# Patient Record
Sex: FEMALE | Race: WHITE | Employment: OTHER | URBAN - METROPOLITAN AREA
[De-identification: names, ages, dates, MRNs, and addresses within clinical notes are randomized per-mention and may not be internally consistent; named-entity substitution may affect disease eponyms.]

---

## 2017-01-24 RX ORDER — METOPROLOL TARTRATE 25 MG/1
12.5 TABLET, FILM COATED ORAL 2 TIMES DAILY
Qty: 90 TAB | Refills: 3 | Status: SHIPPED | OUTPATIENT
Start: 2017-01-24

## 2017-05-11 DIAGNOSIS — I74.10 AORTIC MURAL THROMBUS (HCC): Primary | ICD-10-CM

## 2017-05-12 ENCOUNTER — HOSPITAL ENCOUNTER (OUTPATIENT)
Dept: CT IMAGING | Age: 62
Discharge: HOME | End: 2017-05-12
Attending: PHYSICIAN ASSISTANT
Payer: MEDICARE

## 2017-05-12 DIAGNOSIS — I74.10 AORTIC MURAL THROMBUS (HCC): ICD-10-CM

## 2017-05-12 LAB — CREAT UR-MCNC: 0.7 MG/DL (ref 0.6–1.3)

## 2017-05-12 PROCEDURE — 74011636320 HC RX REV CODE- 636/320: Performed by: PHYSICIAN ASSISTANT

## 2017-05-12 PROCEDURE — 82565 ASSAY OF CREATININE: CPT

## 2017-05-12 PROCEDURE — 74174 CTA ABD&PLVS W/CONTRAST: CPT

## 2017-05-12 RX ADMIN — IOPAMIDOL 100 ML: 755 INJECTION, SOLUTION INTRAVENOUS at 12:00

## 2017-05-25 ENCOUNTER — OFFICE VISIT (OUTPATIENT)
Dept: VASCULAR SURGERY | Age: 62
End: 2017-05-25

## 2017-05-25 VITALS
HEIGHT: 68 IN | SYSTOLIC BLOOD PRESSURE: 126 MMHG | HEART RATE: 74 BPM | RESPIRATION RATE: 12 BRPM | WEIGHT: 178 LBS | BODY MASS INDEX: 26.98 KG/M2 | DIASTOLIC BLOOD PRESSURE: 78 MMHG

## 2017-05-25 DIAGNOSIS — I74.10 AORTIC MURAL THROMBUS (HCC): Primary | ICD-10-CM

## 2017-05-25 DIAGNOSIS — Z72.0 TOBACCO ABUSE: ICD-10-CM

## 2017-05-25 DIAGNOSIS — D73.5 SPLENIC INFARCTION: ICD-10-CM

## 2017-05-25 NOTE — PROGRESS NOTES
Paula    Chief Complaint   Patient presents with   [facility] Blood Clot       HPI    Paula is a 62 y.o. female who is seen in the office today in follow up after recent CTA scan for history of aortic mural thrombus. She presents today with her . Several years ago she was diagnosed with a clotting disorder after suffering extensive clot burden. She is on long term anticoagulation with Coumadin. She was followed by Dr Domonique Ramirez for her aortic thrombus in the past. She suffered embolism to the kidney and spleen as well with splenic infarct. Kidneys recovered fully. She does have some LUQ pains which is chronic. She continues to smoke but is actively trying to quit. No claudication or rest pain. Overall she is doing well and is without complaint today. Past Medical History:   Diagnosis Date    Anemia     Aortic mural thrombus ([facility] [address] 11/24/2014    on coumadin followed by dr Sissy Smith Aortic thrombus [facility]) 03/29/13    CAD (coronary artery disease)     Clot     Aortic clot    Clotting disorder (HCC)     Coronary atherosclerosis of native coronary artery     Stable, 18 mos.  Since pce, will d/c effient    Diabetes mellitus     Heart abnormalities     Hypercholesterolemia     Hyperlipidemia     Iron deficiency     MI (myocardial infarction) ([facility] [address]     Old myocardial infarction     Post RCA PCI    Other and unspecified hyperlipidemia     On multiple meds, pt had side effect of 28 differnet meds, currently on lipofen and livalo    Postsurgical percutaneous transluminal coronary angioplasty status     stable    Postsurgical percutaneous transluminal coronary angioplasty status     Raynaud's disease     Screening for depression     Splenic infarction 03/29/13    Ulcer [facility])      Patient Active Problem List   Diagnosis Code    Coronary atherosclerosis of native coronary artery I25.10    Postsurgical percutaneous transluminal coronary angioplasty status Z98.61    Old myocardial infarction I25.2    Hyperlipidemia E78.5    Aortic mural thrombus (HCC) I74.10     Past Surgical History:   Procedure Laterality Date    CARDIAC SURG PROCEDURE UNLIST      HX  SECTION       delivery    HX COLONOSCOPY      HX CORONARY STENT PLACEMENT      RCA, VALDEZ    HX GYN      HX HEENT      HX HYSTERECTOMY      abdominal    HX ORTHOPAEDIC      right shoulder surgery     Current Outpatient Prescriptions   Medication Sig Dispense Refill    metoprolol tartrate (LOPRESSOR) 25 mg tablet Take 0.5 Tabs by mouth two (2) times a day. 90 Tab 3    co-enzyme Q-10 (CO Q-10) 100 mg capsule Take 100 mg by mouth daily.  senna-docusate (PERICOLACE) 8.6-50 mg per tablet Take 1 Tab by mouth daily.  biotin 2,500 mcg tab Take  by mouth.  pitavastatin (LIVALO) 4 mg tab tablet Take 4 mg by mouth daily.  traZODone (DESYREL) 100 mg tablet Take 100 mg by mouth nightly.  aspirin delayed-release 81 mg tablet Take  by mouth daily.  ferrous sulfate (IRON) 325 mg (65 mg iron) tablet Take  by mouth Daily (before breakfast).  magnesium 250 mg Tab Take  by mouth daily.  furosemide (LASIX) 20 mg tablet Take  by mouth daily.  oxyCODONE-acetaminophen (PERCOCET)  mg per tablet Take 1 Tab by mouth as needed.  warfarin (COUMADIN) 7.5 mg tablet Take 7.5 mg by mouth once as needed (7.5 mg x 5 days then 5mg for 1 day then repeat).  metFORMIN (GLUCOPHAGE) 500 mg tablet Take  by mouth two (2) times daily (with meals).  amLODIPine (NORVASC) 2.5 mg tablet Take 2.2 mg by mouth daily.  esomeprazole (NEXIUM) 40 mg capsule Take 40 mg by mouth daily.  DULoxetine (CYMBALTA) 60 mg capsule Take 30 mg by mouth daily.  CALCIUM CARBONATE (CALCIUM 600) 1,500 (600) mg Tab Take 600 mg by mouth once.  cyanocobalamin (VITAMIN B-12) 2,000 mcg TbER Take 2,000 mcg by mouth once.  doxycycline hyclate (DORYX) 75 mg TbEC Take 30 mg by mouth daily.       Fenofibrate (LIPOFEN) 150 mg Cap Take  by mouth daily.  omega-3 fatty acids-vitamin e (FISH OIL) 1,000 mg Cap Take 1 Cap by mouth daily.  cholecalciferol, vitamin d3, (VITAMIN D) 1,000 unit tablet Take  by mouth daily. Allergies   Allergen Reactions    Latex Other (comments)     Blisters and bleeding from blisters.  Nitroglycerin Other (comments)     Patient states \"that when given medication last time she went into arrest\"    Morphine Shortness of Breath    Sulfa (Sulfonamide Antibiotics) Nausea Only    Tetanus Vaccines And Toxoid Swelling and Other (comments)     fever     Social History     Social History    Marital status: LEGALLY      Spouse name: N/A    Number of children: N/A    Years of education: N/A     Occupational History    Not on file. Social History Main Topics    Smoking status: Current Every Day Smoker     Packs/day: 1.50     Years: 40.00    Smokeless tobacco: Never Used      Comment: working on [employer]; chewing gum and using patch    Alcohol use No      Comment: very rare    Drug use: No    Sexual activity: Yes     Partners: Male     Birth control/ protection: None     Other Topics Concern    Not on file     Social History Narrative      Family History   Problem Relation Age of Onset    Hypertension Other      essential    Heart Attack Other      myocardial infarction    Other Other      Hypercholesterolemia       Physical Exam:    Visit Vitals    /78 (BP 1 Location: Right arm, BP Patient Position: Sitting)    Pulse 74    Resp 12    Ht 5' 8\" (1.727 m)    Wt 178 lb (80.7 kg)    BMI 27.06 kg/m2      General: Well-appearing female in no acute distress   HEENT: EOMI, no scleral icterus is noted. Pulmonary: No increased work or breathing is noted. Abdomen: soft, nontender, nondistended. Extremities: Warm and well perfused bilaterally. Pt has no BLE edema noted.    Neuro: Cranial nerves II through XII are grossly intact   Integument: No ulcerations are identified visibly    CTA C/A/P:  IMPRESSION:     1. Mild atheromatous disease along the aorta. Previous 2013 aortic thrombus has  resolved. 2. No acute findings. 3. Hepatic steatosis with hepatomegaly. 4. Stable chronic splenic subcapsular collection/old infarct, decreased since  2013.  5. Diverticulosis. Impression and Plan:  Ced Castillo is a 62 y.o. female with clotting disorder and history of aortic mural thrombus. She is on long term Coumadin. She does follow with Hematology. Imaging was reviewed with patient and her  in the office today. I also did review the images with Dr. Shara Cobb. CTA scan shows completely resolved aortic thrombus. No other acute findings. No aneurysm or dissection. Discussed that no further CT scans are required at this time. She may follow-up in our office as needed. She will continue her long-term anticoagulation however. Continue to follow up with hematology as scheduled. Patient expresses understanding and agrees to plan. I did also encourage smoking cessation and patient states that she is trying very hard to quit and is working with her primary care physician.       [address], [place]

## 2018-03-19 ENCOUNTER — HOSPITAL ENCOUNTER (OUTPATIENT)
Dept: CT IMAGING | Age: 63
Discharge: HOME | End: 2018-03-19
Attending: INTERNAL MEDICINE
Payer: MEDICARE

## 2018-03-19 DIAGNOSIS — R10.9 ABDOMINAL PAIN: ICD-10-CM

## 2018-03-19 LAB — CREAT UR-MCNC: 1 MG/DL (ref 0.6–1.3)

## 2018-03-19 PROCEDURE — 74011636320 HC RX REV CODE- 636/320: Performed by: INTERNAL MEDICINE

## 2018-03-19 PROCEDURE — 82565 ASSAY OF CREATININE: CPT

## 2018-03-19 PROCEDURE — 74176 CT ABD & PELVIS W/O CONTRAST: CPT

## 2018-03-19 RX ADMIN — IOPAMIDOL 100 ML: 612 INJECTION, SOLUTION INTRAVENOUS at 07:34

## 2018-05-24 ENCOUNTER — HOSPITAL ENCOUNTER (EMERGENCY)
Age: 63
Discharge: HOME | End: 2018-05-24
Attending: EMERGENCY MEDICINE
Payer: MEDICARE

## 2018-05-24 ENCOUNTER — APPOINTMENT (OUTPATIENT)
Dept: GENERAL RADIOLOGY | Age: 63
End: 2018-05-24
Attending: EMERGENCY MEDICINE
Payer: MEDICARE

## 2018-05-24 VITALS
OXYGEN SATURATION: 96 % | WEIGHT: 180 LBS | SYSTOLIC BLOOD PRESSURE: 117 MMHG | HEIGHT: 68 IN | DIASTOLIC BLOOD PRESSURE: 73 MMHG | HEART RATE: 88 BPM | BODY MASS INDEX: 27.28 KG/M2 | RESPIRATION RATE: 16 BRPM | TEMPERATURE: 97.6 F

## 2018-05-24 DIAGNOSIS — K92.1 BLOOD IN STOOL: Primary | ICD-10-CM

## 2018-05-24 DIAGNOSIS — K59.00 CONSTIPATION, UNSPECIFIED CONSTIPATION TYPE: ICD-10-CM

## 2018-05-24 LAB
ABO + RH BLD: NORMAL
ANION GAP SERPL CALC-SCNC: 9 MMOL/L (ref 3–18)
APTT PPP: 35.7 SEC (ref 23–36.4)
BASOPHILS # BLD: 0 K/UL (ref 0–0.1)
BASOPHILS NFR BLD: 0 % (ref 0–2)
BLOOD GROUP ANTIBODIES SERPL: NORMAL
BUN SERPL-MCNC: 19 MG/DL (ref 7–18)
BUN/CREAT SERPL: 17 (ref 12–20)
CALCIUM SERPL-MCNC: 9.7 MG/DL (ref 8.5–10.1)
CHLORIDE SERPL-SCNC: 102 MMOL/L (ref 100–108)
CO2 SERPL-SCNC: 28 MMOL/L (ref 21–32)
CREAT SERPL-MCNC: 1.13 MG/DL (ref 0.6–1.3)
DIFFERENTIAL METHOD BLD: ABNORMAL
EOSINOPHIL # BLD: 0.2 K/UL (ref 0–0.4)
EOSINOPHIL NFR BLD: 2 % (ref 0–5)
ERYTHROCYTE [DISTWIDTH] IN BLOOD BY AUTOMATED COUNT: 14 % (ref 11.6–14.5)
GLUCOSE SERPL-MCNC: 81 MG/DL (ref 74–99)
HCT VFR BLD AUTO: 38.6 % (ref 35–45)
HGB BLD-MCNC: 13.3 G/DL (ref 12–16)
INR PPP: 2.1 (ref 0.8–1.2)
LYMPHOCYTES # BLD: 4 K/UL (ref 0.9–3.6)
LYMPHOCYTES NFR BLD: 48 % (ref 21–52)
MCH RBC QN AUTO: 31.1 PG (ref 24–34)
MCHC RBC AUTO-ENTMCNC: 34.5 G/DL (ref 31–37)
MCV RBC AUTO: 90.4 FL (ref 74–97)
MONOCYTES # BLD: 0.8 K/UL (ref 0.05–1.2)
MONOCYTES NFR BLD: 10 % (ref 3–10)
NEUTS SEG # BLD: 3.3 K/UL (ref 1.8–8)
NEUTS SEG NFR BLD: 40 % (ref 40–73)
PLATELET # BLD AUTO: 330 K/UL (ref 135–420)
PMV BLD AUTO: 11.1 FL (ref 9.2–11.8)
POTASSIUM SERPL-SCNC: 3.7 MMOL/L (ref 3.5–5.5)
PROTHROMBIN TIME: 23.2 SEC (ref 11.5–15.2)
RBC # BLD AUTO: 4.27 M/UL (ref 4.2–5.3)
SODIUM SERPL-SCNC: 139 MMOL/L (ref 136–145)
SPECIMEN EXP DATE BLD: NORMAL
WBC # BLD AUTO: 8.3 K/UL (ref 4.6–13.2)

## 2018-05-24 PROCEDURE — 86900 BLOOD TYPING SEROLOGIC ABO: CPT | Performed by: PHYSICIAN ASSISTANT

## 2018-05-24 PROCEDURE — 85730 THROMBOPLASTIN TIME PARTIAL: CPT | Performed by: PHYSICIAN ASSISTANT

## 2018-05-24 PROCEDURE — 85610 PROTHROMBIN TIME: CPT | Performed by: PHYSICIAN ASSISTANT

## 2018-05-24 PROCEDURE — 85025 COMPLETE CBC W/AUTO DIFF WBC: CPT | Performed by: PHYSICIAN ASSISTANT

## 2018-05-24 PROCEDURE — 99282 EMERGENCY DEPT VISIT SF MDM: CPT

## 2018-05-24 PROCEDURE — 80048 BASIC METABOLIC PNL TOTAL CA: CPT | Performed by: PHYSICIAN ASSISTANT

## 2018-05-24 PROCEDURE — 74022 RADEX COMPL AQT ABD SERIES: CPT

## 2018-05-24 RX ORDER — POLYETHYLENE GLYCOL 3350 17 G/17G
17 POWDER, FOR SOLUTION ORAL DAILY
Qty: 510 G | Refills: 0 | Status: SHIPPED | OUTPATIENT
Start: 2018-05-24 | End: 2018-10-03

## 2018-05-24 NOTE — DISCHARGE INSTRUCTIONS
Increase clear fluid oral hydration and begin daily Miralax  Avoid products containing tobacco, alcohol, caffeine and anti-inflammatory medications  Return immediately for increasing pain, fever, heavy bleeding, weakness, or any other concerns           Upper Gastrointestinal Bleeding: Care Instructions  Your Care Instructions    The digestive or gastrointestinal tract goes from the mouth to the anus. It is often called the GI tract. Bleeding in the upper GI tract can happen anywhere from the esophagus to the first part of the small intestine. Sometimes it's caused by an ulcer in your stomach. Or it may be caused by blood vessels in your esophagus. Your esophagus is the tube that carries food from your throat to your stomach. Light bleeding may not cause any symptoms at first. But if you continue to bleed for a while, you may feel very weak or tired. Sudden, heavy bleeding means you need to see a doctor right away. This kind of bleeding can be very dangerous. But it can usually be cured or controlled. The doctor may do some tests to find the cause of your bleeding. Follow-up care is a key part of your treatment and safety. Be sure to make and go to all appointments, and call your doctor if you are having problems. It's also a good idea to know your test results and keep a list of the medicines you take. How can you care for yourself at home? · Be safe with medicines. Take your medicines exactly as prescribed. Call your doctor if you think you are having a problem with your medicine. You will get more details on the specific medicines your doctor prescribes. · Do not take aspirin or other anti-inflammatory medicines, such as naproxen (Aleve) or ibuprofen (Advil, Motrin), without talking to your doctor first. Ask your doctor if it is okay to use acetaminophen (Tylenol). · Do not drink alcohol. · The bleeding may make you lose iron. So it's important to eat foods that have a lot of iron.  These include red meat, shellfish, poultry, and eggs. They also include beans, raisins, whole-grain breads, and leafy green vegetables. If you want help planning meals, you can meet with a dietitian. When should you call for help? Call 911 anytime you think you may need emergency care. For example, call if:  ? · You have sudden, severe belly pain. ? · You vomit blood or what looks like coffee grounds. ? · You passed out (lost consciousness). ? · Your stools are maroon or very bloody. ?Call your doctor now or seek immediate medical care if:  ? · You are dizzy or lightheaded, or you feel like you may faint. ? · Your stools are black and look like tar.   ? · You have belly pain. ? · You vomit or have nausea. ? · You have trouble swallowing, or it hurts when you swallow. ? Watch closely for changes in your health, and be sure to contact your doctor if you do not get better as expected. Where can you learn more? Go to http://bc-angelina.info/. Enter O987 in the search box to learn more about \"Upper Gastrointestinal Bleeding: Care Instructions. \"  Current as of: March 20, 2017  Content Version: 11.4  © 2029-0539 [employer]. Care instructions adapted under license by [employer] (which disclaims liability or warranty for this information). If you have questions about a medical condition or this instruction, always ask your healthcare professional. [place] [address] any warranty or liability for your use of this information.

## 2018-05-24 NOTE — ED NOTES
Report given to [facility].  No questions or concerns after report

## 2018-05-24 NOTE — ED PROVIDER NOTES
EMERGENCY DEPARTMENT HISTORY AND PHYSICAL EXAM    6:14 PM      Date: 5/24/2018  Patient Name: Omar Allen    History of Presenting Illness     Chief Complaint   Patient presents with    Abdominal Pain    Rectal Bleeding         History Provided By: Patient    Chief Complaint: Abdominal pain  Duration: 3 Days  Timing:  Constant  Location: LLQ  Quality: \"black tar\"  Severity: 5/10  Modifying Factors: Eating exacerbates nausea and vomiting  Associated Symptoms: constipation, dysuria, hematuria, nausea, and vomiting      Additional History (Context): Omar Allen is a 65 y.o. female with diabetes, HLD, MI, iron deficiency, and anemia who presents with constant LLQ abdominal pain for the last 3 days. Pt reports that her pain is a 5/10. She has been constipated for the same duration and what little stool comes out is like a \"black tar\" and \"thicker than usual.\" The pt has taken several over the counter laxatives with no relief. She takes iron supplements daily and opioids occasionally. Her only other sx include dysuria, hematuria, nausea, and vomiting. Urgent care ruled out UTI and Kidney infection earlier today. The pt had blood work preformed as well and a heme positive stool sample was obtained. After eating the pt vomits so she has only been drinking water for the past day. Her most recent colonoscopy was preformed by Dr. Oz Hartley and only revealed 1 benign polyp. The pt doesn't drink but she smokes 2.5 ppd. No other concerns, modifying factors, or symptoms were expressed by the pt at this time. PCP: Eladia Moon MD    Current Outpatient Prescriptions   Medication Sig Dispense Refill    polyethylene glycol (MIRALAX) 17 gram/dose powder Take 17 g by mouth daily. 1 tablespoon with 8 oz of water daily 510 g 0    metoprolol tartrate (LOPRESSOR) 25 mg tablet Take 0.5 Tabs by mouth two (2) times a day. 90 Tab 3    co-enzyme Q-10 (CO Q-10) 100 mg capsule Take 100 mg by mouth daily.       senna-docusate (PERICOLACE) 8.6-50 mg per tablet Take 1 Tab by mouth daily.  biotin 2,500 mcg tab Take  by mouth.  pitavastatin (LIVALO) 4 mg tab tablet Take 4 mg by mouth daily.  traZODone (DESYREL) 100 mg tablet Take 100 mg by mouth nightly.  aspirin delayed-release 81 mg tablet Take  by mouth daily.  ferrous sulfate (IRON) 325 mg (65 mg iron) tablet Take  by mouth Daily (before breakfast).  magnesium 250 mg Tab Take  by mouth daily.  furosemide (LASIX) 20 mg tablet Take  by mouth daily.  oxyCODONE-acetaminophen (PERCOCET)  mg per tablet Take 1 Tab by mouth as needed.  warfarin (COUMADIN) 7.5 mg tablet Take 7.5 mg by mouth once as needed (7.5 mg x 5 days then 5mg for 1 day then repeat).  metFORMIN (GLUCOPHAGE) 500 mg tablet Take  by mouth two (2) times daily (with meals).  amLODIPine (NORVASC) 2.5 mg tablet Take 2.2 mg by mouth daily.  esomeprazole (NEXIUM) 40 mg capsule Take 40 mg by mouth daily.  DULoxetine (CYMBALTA) 60 mg capsule Take 30 mg by mouth daily.  CALCIUM CARBONATE (CALCIUM 600) 1,500 (600) mg Tab Take 600 mg by mouth once.  cyanocobalamin (VITAMIN B-12) 2,000 mcg TbER Take 2,000 mcg by mouth once.  doxycycline hyclate (DORYX) 75 mg TbEC Take 30 mg by mouth daily.  Fenofibrate (LIPOFEN) 150 mg Cap Take  by mouth daily.  omega-3 fatty acids-vitamin e (FISH OIL) 1,000 mg Cap Take 1 Cap by mouth daily.  cholecalciferol, vitamin d3, (VITAMIN D) 1,000 unit tablet Take  by mouth daily. Past History     Past Medical History:  Past Medical History:   Diagnosis Date    Anemia     Aortic mural thrombus ([address] 11/24/2014    on coumadin followed by dr Gurpreet Reynoso Aortic thrombus [facility]) 03/29/13    CAD (coronary artery disease)     Clot     Aortic clot    Clotting disorder (HCC)     Coronary atherosclerosis of native coronary artery     Stable, 18 mos.  Since pce, will d/c effient    Diabetes mellitus     Heart abnormalities     Hypercholesterolemia     Hyperlipidemia     Iron deficiency     [facility] [address]     Old myocardial infarction     Post RCA PCI    Other and unspecified hyperlipidemia     On multiple meds, pt had side effect of 28 differnet meds, currently on lipofen and livalo    Postsurgical percutaneous transluminal coronary angioplasty status     stable    Postsurgical percutaneous transluminal coronary angioplasty status     Raynaud's disease     Screening for depression     Splenic infarction 13    Ulcer ([facility] [address]        Past Surgical History:  Past Surgical History:   Procedure Laterality Date    CARDIAC SURG PROCEDURE UNLIST      HX  SECTION       delivery    HX COLONOSCOPY      HX CORONARY STENT PLACEMENT      RCA, VALDEZ    HX GYN      HX HEENT      HX HYSTERECTOMY      abdominal    HX ORTHOPAEDIC      right shoulder surgery       Family History:  Family History   Problem Relation Age of Onset    Hypertension Other      essential    Heart Attack Other      myocardial infarction    Other Other      Hypercholesterolemia       Social History:  Social History   Substance Use Topics    Smoking status: Current Every Day Smoker     Packs/day: 1.50     Years: 40.00    Smokeless tobacco: Never Used      Comment: working on quiting; chewing gum and using patch    Alcohol use No      Comment: very rare       Allergies: Allergies   Allergen Reactions    Latex Other (comments)     Blisters and bleeding from blisters.  Nitroglycerin Other (comments)     Patient states \"that when given medication last time she went into arrest\"    Morphine Shortness of Breath    Sulfa (Sulfonamide Antibiotics) Nausea Only    Tetanus Vaccines And Toxoid Swelling and Other (comments)     fever         Review of Systems     Review of Systems   Constitutional: Negative for activity change, appetite change, chills, diaphoresis, fatigue, fever and unexpected weight change. HENT: Negative for congestion, dental problem, drooling, ear discharge, ear pain, facial swelling, hearing loss, mouth sores, nosebleeds, postnasal drip, rhinorrhea, sinus pressure, sneezing, sore throat, tinnitus and trouble swallowing. Eyes: Negative for photophobia, pain, discharge, redness, itching and visual disturbance. Respiratory: Negative for apnea, cough, choking, chest tightness, shortness of breath, wheezing and stridor. Cardiovascular: Negative for chest pain, palpitations and leg swelling. Gastrointestinal: Positive for abdominal pain, constipation, nausea and vomiting. Negative for abdominal distention, anal bleeding, blood in stool, diarrhea and rectal pain. Endocrine: Negative for cold intolerance, heat intolerance, polydipsia, polyphagia and polyuria. Genitourinary: Positive for dysuria and hematuria. Negative for decreased urine volume, difficulty urinating, enuresis, flank pain, frequency, genital sores and urgency. Musculoskeletal: Negative for arthralgias, back pain, gait problem, joint swelling, myalgias, neck pain and neck stiffness. Skin: Negative for color change, pallor, rash and wound. Allergic/Immunologic: Negative for environmental allergies, food allergies and immunocompromised state. Neurological: Negative for dizziness, tremors, seizures, syncope, facial asymmetry, speech difficulty, weakness, light-headedness, numbness and headaches. Hematological: Negative for adenopathy. Does not bruise/bleed easily. Psychiatric/Behavioral: Negative for agitation, behavioral problems, confusion, decreased concentration, dysphoric mood, hallucinations, self-injury, sleep disturbance and suicidal ideas. The patient is not nervous/anxious and is not hyperactive.           Physical Exam     Visit Vitals    /73 (BP 1 Location: Left arm, BP Patient Position: Sitting)    Pulse 88    Temp 97.6 °F (36.4 °C)    Resp 16    Ht 5' 8\" (1.727 m)    Wt 81.6 kg (180 lb)    SpO2 96%    BMI 27.37 kg/m2     Physical Exam   Constitutional: She is oriented to person, place, and time. She appears well-developed and well-nourished. Alert and appropriate in no apparent marked discomfort or acute respiratory distress   HENT:   Head: Normocephalic and atraumatic. Right Ear: External ear normal.   Left Ear: External ear normal.   Mouth/Throat: Oropharynx is clear and moist. No oropharyngeal exudate. Eyes: Conjunctivae and EOM are normal. Pupils are equal, round, and reactive to light. Right eye exhibits no discharge. Left eye exhibits no discharge. No scleral icterus. Neck: Normal range of motion. No tracheal deviation present. No thyromegaly present. Cardiovascular: Normal rate, regular rhythm and normal heart sounds. No murmur heard. Pulmonary/Chest: Effort normal and breath sounds normal. No respiratory distress. She has no wheezes. She has no rales. She exhibits no tenderness. Abdominal: Soft. Bowel sounds are normal. She exhibits no distension. There is no tenderness. There is no rebound and no guarding. Abdomen is flat and soft with mild LLQ tenderness without guarding; +NABS without masses or hernias; no evidence of focal peritoneal irritation or HSM   Musculoskeletal: Normal range of motion. She exhibits no edema or tenderness. Lymphadenopathy:     She has no cervical adenopathy. Neurological: She is alert and oriented to person, place, and time. No cranial nerve deficit. Coordination normal.   Skin: Skin is warm. No erythema. Psychiatric: She has a normal mood and affect. Her behavior is normal. Judgment and thought content normal.   Nursing note and vitals reviewed. Diagnostic Study Results     Labs -  No results found for this or any previous visit (from the past 12 hour(s)).   Recent Results (from the past 12 hour(s))   CBC WITH AUTOMATED DIFF    Collection Time: 05/24/18  5:40 PM   Result Value Ref Range    WBC 8.3 4.6 - 13.2 K/uL    RBC 4.27 4.20 - 5.30 M/uL    HGB 13.3 12.0 - 16.0 g/dL    HCT 38.6 35.0 - 45.0 %    MCV 90.4 74.0 - 97.0 FL    MCH 31.1 24.0 - 34.0 PG    MCHC 34.5 31.0 - 37.0 g/dL    RDW 14.0 11.6 - 14.5 %    PLATELET 028 225 - 011 K/uL    MPV 11.1 9.2 - 11.8 FL    NEUTROPHILS 40 40 - 73 %    LYMPHOCYTES 48 21 - 52 %    MONOCYTES 10 3 - 10 %    EOSINOPHILS 2 0 - 5 %    BASOPHILS 0 0 - 2 %    ABS. NEUTROPHILS 3.3 1.8 - 8.0 K/UL    ABS. LYMPHOCYTES 4.0 (H) 0.9 - 3.6 K/UL    ABS. MONOCYTES 0.8 0.05 - 1.2 K/UL    ABS. EOSINOPHILS 0.2 0.0 - 0.4 K/UL    ABS. BASOPHILS 0.0 0.0 - 0.1 K/UL    DF AUTOMATED     TYPE & SCREEN    Collection Time: 05/24/18  5:40 PM   Result Value Ref Range    Crossmatch Expiration 05/27/2018     ABO/Rh(D) PENDING     Antibody screen PENDING      PT- 2.1  BMP unremarkable        Radiologic Studies -   XR ABD ACUTE W 1 V CHEST   Final Result      History: Left lower quadrant pain     Comparison: November 15, 2014     Findings: The lungs are grossly clear. Heart is normal in size. Prior right  shoulder surgery.     No obstruction, ileus, or free air is seen. There is moderate stool burden. No  definite acute osseous abnormality.     IMPRESSION  Impression:  1. No obstruction, ileus, or free air. Moderate stool burden. Medical Decision Making   I am the first provider for this patient. I reviewed the vital signs, available nursing notes, past medical history, past surgical history, family history and social history. Vital Signs-Reviewed the patient's vital signs. Pulse Oximetry Analysis - Nonhypoxic    Cardiac Monitor:  Rate: 88  Rhythm:  NSR    Records Reviewed: Nursing Notes and Old Medical Records (Time of Review: 6:14 PM)    ED Course: Progress Notes, Reevaluation, and Consults:  Abdominal examinations were stable without signs of active bleeding, tachycardia or hypotension. I discussed the clinical findings with the patient, her family and GI all of whom agreed with outpatient follow-up.  Precautions were given prior to discharge. Will try bowel clean-out and antiacids at home but will no stop Coumadin at this time. Provider Notes (Medical Decision Making): Patient with documentation of guaiac positive stools from Urgent Care but no signs of significant active hemorrhage. Constipation with reassuring examination but will check plain films for free air or obstruction. Will check CBC to see if stable from earlier today because on Coumadin. Will have follow-up with GI if labs and hemodynamics are stable. Diagnosis     Clinical Impression:   1. Blood in stool    2. Constipation, unspecified constipation type        Disposition: Discharge    Follow-up Information     Follow up With Details Comments Contact Info    [facility] EMER[facility]ENC[facility] DEPT  As needed, If symptoms worsen [address] Maksim Alcantara MD In 1 week  [address] [place] [address]  [address]  [place] [address]  [phone]             Discharge Medication List as of 5/24/2018  7:31 PM      START taking these medications    Details   polyethylene glycol (MIRALAX) 17 gram/dose powder Take 17 g by mouth daily. 1 tablespoon with 8 oz of water daily, Normal, Disp-510 g, R-0         CONTINUE these medications which have NOT CHANGED    Details   metoprolol tartrate (LOPRESSOR) 25 mg tablet Take 0.5 Tabs by mouth two (2) times a day., Normal, Disp-90 Tab, R-3      co-enzyme Q-10 (CO Q-10) 100 mg capsule Take 100 mg by mouth daily. , Historical Med      senna-docusate (PERICOLACE) 8.6-50 mg per tablet Take 1 Tab by mouth daily. , Historical Med      biotin 2,500 mcg tab Take  by mouth., Historical Med      pitavastatin (LIVALO) 4 mg tab tablet Take 4 mg by mouth daily. , Historical Med      traZODone (DESYREL) 100 mg tablet Take 100 mg by mouth nightly., Historical Med      aspirin delayed-release 81 mg tablet Take  by mouth daily. , Historical Med      ferrous sulfate (IRON) 325 mg (65 mg iron) tablet Take  by mouth Daily (before breakfast). Historical Med      magnesium 250 mg Tab Take  by mouth daily. , Historical Med      furosemide (LASIX) 20 mg tablet Take  by mouth daily. Historical Med      oxyCODONE-acetaminophen (PERCOCET)  mg per tablet Take 1 Tab by mouth as needed. Historical Med, 1 Tab      warfarin (COUMADIN) 7.5 mg tablet Take 7.5 mg by mouth once as needed (7.5 mg x 5 days then 5mg for 1 day then repeat). , Historical Med      metFORMIN (GLUCOPHAGE) 500 mg tablet Take  by mouth two (2) times daily (with meals). Historical Med      amLODIPine (NORVASC) 2.5 mg tablet Take 2.2 mg by mouth daily. Historical Med, 2.2 mg      esomeprazole (NEXIUM) 40 mg capsule Take 40 mg by mouth daily. Historical Med, 40 mg      DULoxetine (CYMBALTA) 60 mg capsule Take 30 mg by mouth daily. , Historical Med      CALCIUM CARBONATE (CALCIUM 600) 1,500 (600) mg Tab Take 600 mg by mouth once. Historical Med, 600 mg      cyanocobalamin (VITAMIN B-12) 2,000 mcg TbER Take 2,000 mcg by mouth once. Historical Med, 2,000 mcg      doxycycline hyclate (DORYX) 75 mg TbEC Take 30 mg by mouth daily. , Historical Med      Fenofibrate (LIPOFEN) 150 mg Cap Take  by mouth daily. , Historical Med      omega-3 fatty acids-vitamin e (FISH OIL) 1,000 mg Cap Take 1 Cap by mouth daily. , Historical Med      cholecalciferol, vitamin d3, (VITAMIN D) 1,000 unit tablet Take  by mouth daily. Historical Med           _______________________________    Attestations:  Scribe Attestation     Ellen Harris acting as a scribe for and in the presence of Danilo Gasca MD      May 24, 2018 at 6:14 PM       Provider Attestation:      I personally performed the services described in the documentation, reviewed the documentation, as recorded by the scribe in my presence, and it accurately and completely records my words and actions.  May 24, 2018 at 6:14 PM - Danilo Gasca MD    _______________________________

## 2018-05-24 NOTE — ED NOTES
Reviewed discharge instructions. Questions encouraged and answered. Patient verbalized an understanding.   Departed ed in no distress ambulatory with family

## 2018-05-30 ENCOUNTER — ANESTHESIA EVENT (OUTPATIENT)
Dept: ENDOSCOPY | Age: 63
End: 2018-05-30
Payer: MEDICARE

## 2018-05-31 ENCOUNTER — HOSPITAL ENCOUNTER (OUTPATIENT)
Age: 63
Setting detail: OUTPATIENT SURGERY
Discharge: HOME | End: 2018-05-31
Attending: INTERNAL MEDICINE | Admitting: INTERNAL MEDICINE
Payer: MEDICARE

## 2018-05-31 ENCOUNTER — ANESTHESIA (OUTPATIENT)
Dept: ENDOSCOPY | Age: 63
End: 2018-05-31
Payer: MEDICARE

## 2018-05-31 VITALS
HEART RATE: 78 BPM | OXYGEN SATURATION: 97 % | TEMPERATURE: 97.9 F | DIASTOLIC BLOOD PRESSURE: 73 MMHG | SYSTOLIC BLOOD PRESSURE: 123 MMHG | RESPIRATION RATE: 18 BRPM

## 2018-05-31 LAB
GLUCOSE BLD STRIP.AUTO-MCNC: 91 MG/DL (ref 70–110)
GLUCOSE BLD STRIP.AUTO-MCNC: 94 MG/DL (ref 70–110)
INR BLD: 2.2 (ref 0.9–1.2)

## 2018-05-31 PROCEDURE — 82962 GLUCOSE BLOOD TEST: CPT

## 2018-05-31 PROCEDURE — 77030018846 HC SOL IRR STRL H20 ICUM -A: Performed by: INTERNAL MEDICINE

## 2018-05-31 PROCEDURE — 77030008565 HC TBNG SUC IRR ERBE -B: Performed by: INTERNAL MEDICINE

## 2018-05-31 PROCEDURE — 74011250636 HC RX REV CODE- 250/636

## 2018-05-31 PROCEDURE — 74011000250 HC RX REV CODE- 250

## 2018-05-31 PROCEDURE — 77030019988 HC FCPS ENDOSC DISP BSC -B: Performed by: INTERNAL MEDICINE

## 2018-05-31 PROCEDURE — 74011250636 HC RX REV CODE- 250/636: Performed by: NURSE ANESTHETIST, CERTIFIED REGISTERED

## 2018-05-31 PROCEDURE — 76060000031 HC ANESTHESIA FIRST 0.5 HR: Performed by: INTERNAL MEDICINE

## 2018-05-31 PROCEDURE — 85610 PROTHROMBIN TIME: CPT

## 2018-05-31 PROCEDURE — 76040000019: Performed by: INTERNAL MEDICINE

## 2018-05-31 PROCEDURE — 74011000250 HC RX REV CODE- 250: Performed by: NURSE ANESTHETIST, CERTIFIED REGISTERED

## 2018-05-31 RX ORDER — INSULIN LISPRO 100 [IU]/ML
INJECTION, SOLUTION INTRAVENOUS; SUBCUTANEOUS ONCE
Status: CANCELLED | OUTPATIENT
Start: 2018-05-31 | End: 2018-05-31

## 2018-05-31 RX ORDER — INSULIN LISPRO 100 [IU]/ML
INJECTION, SOLUTION INTRAVENOUS; SUBCUTANEOUS ONCE
Status: DISCONTINUED | OUTPATIENT
Start: 2018-05-31 | End: 2018-05-31 | Stop reason: HOSPADM

## 2018-05-31 RX ORDER — LIDOCAINE HYDROCHLORIDE 20 MG/ML
INJECTION, SOLUTION EPIDURAL; INFILTRATION; INTRACAUDAL; PERINEURAL AS NEEDED
Status: DISCONTINUED | OUTPATIENT
Start: 2018-05-31 | End: 2018-05-31 | Stop reason: HOSPADM

## 2018-05-31 RX ORDER — MAGNESIUM SULFATE 100 %
4 CRYSTALS MISCELLANEOUS AS NEEDED
Status: CANCELLED | OUTPATIENT
Start: 2018-05-31

## 2018-05-31 RX ORDER — SODIUM CHLORIDE, SODIUM LACTATE, POTASSIUM CHLORIDE, CALCIUM CHLORIDE 600; 310; 30; 20 MG/100ML; MG/100ML; MG/100ML; MG/100ML
50 INJECTION, SOLUTION INTRAVENOUS CONTINUOUS
Status: DISCONTINUED | OUTPATIENT
Start: 2018-05-31 | End: 2018-05-31 | Stop reason: HOSPADM

## 2018-05-31 RX ORDER — ONDANSETRON 2 MG/ML
4 INJECTION INTRAMUSCULAR; INTRAVENOUS ONCE
Status: CANCELLED | OUTPATIENT
Start: 2018-05-31 | End: 2018-05-31

## 2018-05-31 RX ORDER — PROPOFOL 10 MG/ML
INJECTION, EMULSION INTRAVENOUS AS NEEDED
Status: DISCONTINUED | OUTPATIENT
Start: 2018-05-31 | End: 2018-05-31 | Stop reason: HOSPADM

## 2018-05-31 RX ORDER — DEXTROSE 50 % IN WATER (D50W) INTRAVENOUS SYRINGE
25-50 AS NEEDED
Status: CANCELLED | OUTPATIENT
Start: 2018-05-31

## 2018-05-31 RX ADMIN — PROPOFOL 20 MG: 10 INJECTION, EMULSION INTRAVENOUS at 14:24

## 2018-05-31 RX ADMIN — PROPOFOL 30 MG: 10 INJECTION, EMULSION INTRAVENOUS at 14:21

## 2018-05-31 RX ADMIN — FAMOTIDINE 20 MG: 10 INJECTION, SOLUTION INTRAVENOUS at 12:02

## 2018-05-31 RX ADMIN — PROPOFOL 20 MG: 10 INJECTION, EMULSION INTRAVENOUS at 14:22

## 2018-05-31 RX ADMIN — LIDOCAINE HYDROCHLORIDE 50 MG: 20 INJECTION, SOLUTION EPIDURAL; INFILTRATION; INTRACAUDAL; PERINEURAL at 14:18

## 2018-05-31 RX ADMIN — PROPOFOL 50 MG: 10 INJECTION, EMULSION INTRAVENOUS at 14:20

## 2018-05-31 RX ADMIN — SODIUM CHLORIDE, SODIUM LACTATE, POTASSIUM CHLORIDE, AND CALCIUM CHLORIDE 50 ML/HR: 600; 310; 30; 20 INJECTION, SOLUTION INTRAVENOUS at 12:02

## 2018-05-31 NOTE — ANESTHESIA PREPROCEDURE EVALUATION
Anesthetic History   No history of anesthetic complications            Review of Systems / Medical History  Patient summary reviewed and pertinent labs reviewed    Pulmonary          Smoker         Neuro/Psych   Within defined limits           Cardiovascular              CAD    Exercise tolerance: >4 METS     GI/Hepatic/Renal     GERD: poorly controlled           Endo/Other    Diabetes: well controlled, type 2         Other Findings   Comments: Documentation of current medication  Current medications obtained, documented and obtained? YES      Risk Factors for Postoperative nausea/vomiting:       History of postoperative nausea/vomiting? NO       Female? YES       Motion sickness? NO       Intended opioid administration for postoperative analgesia? NO      Smoking Abstinence:  Current Smoker? YES  Elective Surgery? YES  Seen preoperatively by anesthesiologist or proxy prior to day of surgery? YES  Pt abstained from smoking 24 hours prior to anesthesia?  NO    Preventive care/screening for High Blood Pressure:  Aged 18 years and older: YES  Screened for high blood pressure: YES  Patients with high blood pressure referred to primary care provider   for BP management: YES                 Physical Exam    Airway  Mallampati: III  TM Distance: 4 - 6 cm  Neck ROM: decreased range of motion   Mouth opening: Normal     Cardiovascular    Rhythm: regular  Rate: normal         Dental  No notable dental hx       Pulmonary  Breath sounds clear to auscultation               Abdominal  GI exam deferred       Other Findings            Anesthetic Plan    ASA: 3  Anesthesia type: MAC            Anesthetic plan and risks discussed with: Patient

## 2018-05-31 NOTE — H&P
[email or web address]  [phone]      GASTROENTEROLOGY Pre-Procedure H and P      Impression/Plan:   1. This patient is consented for an EGD for GI bleeding      Chief Complaint: GI bleeding      HPI:  Jessie Rao is a 65 y.o. female who is being is having an EGD for GI bleeding  PMH:   Past Medical History:   Diagnosis Date    Anemia     Aortic mural thrombus ([facility] [address] 2014    on coumadin followed by dr Tona Gandara Aortic thrombus [facility]) 13    CAD (coronary artery disease)     Clot     Aortic clot    Clotting disorder ([facility] [address]     Coronary atherosclerosis of native coronary artery     Stable, 18 mos. Since pce, will d/c effient    Diabetes mellitus     Heart abnormalities     Hypercholesterolemia     Hyperlipidemia     Iron deficiency     MI (myocardial infarction) ([facility] [address]     Old myocardial infarction     Post RCA PCI    Other and unspecified hyperlipidemia     On multiple meds, pt had side effect of 28 differnet meds, currently on lipofen and livalo    Postsurgical percutaneous transluminal coronary angioplasty status     stable    Postsurgical percutaneous transluminal coronary angioplasty status     Raynaud's disease     Screening for depression     Splenic infarction 13    Ulcer ([facility] [address]        PSH:   Past Surgical History:   Procedure Laterality Date    CARDIAC SURG PROCEDURE UNLIST      HX  SECTION       delivery    HX COLONOSCOPY      HX CORONARY STENT PLACEMENT      RCA, VALDEZ    HX GYN      HX HEENT      HX HYSTERECTOMY      abdominal    HX ORTHOPAEDIC      right shoulder surgery       Social HX:   Social History     Social History    Marital status:      Spouse name: N/A    Number of children: N/A    Years of education: N/A     Occupational History    Not on file.      Social History Main Topics    Smoking status: Current Every Day Smoker     Packs/day: 1.50     Years: 40.00    Smokeless tobacco: Never Used      Comment: working on [employer]; chewing gum and using patch    Alcohol use No      Comment: very rare    Drug use: No    Sexual activity: Yes     Partners: Male     Birth control/ protection: None     Other Topics Concern    Not on file     Social History Narrative       FHX:   Family History   Problem Relation Age of Onset    Hypertension Other      essential    Heart Attack Other      myocardial infarction    Other Other      Hypercholesterolemia       Allergy:   Allergies   Allergen Reactions    Latex Other (comments)     Blisters and bleeding from blisters.  Nitroglycerin Other (comments)     Patient states \"that when given medication last time she went into arrest\"    Morphine Shortness of Breath    Sulfa (Sulfonamide Antibiotics) Nausea Only    Tetanus Vaccines And Toxoid Swelling and Other (comments)     fever       Home Medications:     Prescriptions Prior to Admission   Medication Sig    metoprolol tartrate (LOPRESSOR) 25 mg tablet Take 0.5 Tabs by mouth two (2) times a day.  co-enzyme Q-10 (CO Q-10) 100 mg capsule Take 100 mg by mouth daily.  pitavastatin (LIVALO) 4 mg tab tablet Take 4 mg by mouth daily.  traZODone (DESYREL) 100 mg tablet Take 100 mg by mouth nightly.  aspirin delayed-release 81 mg tablet Take  by mouth daily.  ferrous sulfate (IRON) 325 mg (65 mg iron) tablet Take  by mouth Daily (before breakfast).  magnesium 250 mg Tab Take  by mouth daily.  furosemide (LASIX) 20 mg tablet Take  by mouth daily.  warfarin (COUMADIN) 7.5 mg tablet Take 7.5 mg by mouth once as needed (7.5 mg x 5 days then 5mg for 1 day then repeat).  metFORMIN (GLUCOPHAGE) 500 mg tablet Take  by mouth two (2) times daily (with meals).  amLODIPine (NORVASC) 2.5 mg tablet Take 2.2 mg by mouth daily.  esomeprazole (NEXIUM) 40 mg capsule Take 40 mg by mouth daily.  DULoxetine (CYMBALTA) 60 mg capsule Take 30 mg by mouth daily.  CALCIUM CARBONATE (CALCIUM 600) 1,500 (600) mg Tab Take 600 mg by mouth once.  cyanocobalamin (VITAMIN B-12) 2,000 mcg TbER Take 2,000 mcg by mouth once.  doxycycline hyclate (DORYX) 75 mg TbEC Take 30 mg by mouth daily.  Fenofibrate (LIPOFEN) 150 mg Cap Take  by mouth daily.  omega-3 fatty acids-vitamin e (FISH OIL) 1,000 mg Cap Take 1 Cap by mouth daily.  cholecalciferol, vitamin d3, (VITAMIN D) 1,000 unit tablet Take  by mouth daily.  polyethylene glycol (MIRALAX) 17 gram/dose powder Take 17 g by mouth daily. 1 tablespoon with 8 oz of water daily    senna-docusate (PERICOLACE) 8.6-50 mg per tablet Take 1 Tab by mouth daily.  biotin 2,500 mcg tab Take  by mouth.  oxyCODONE-acetaminophen (PERCOCET)  mg per tablet Take 1 Tab by mouth as needed. Review of Systems:     Constitutional: No fevers, chills, weight loss, fatigue. Skin: No rashes, pruritis, jaundice, ulcerations, erythema. HENT: No headaches, nosebleeds, sinus pressure, rhinorrhea, sore throat. Eyes: No visual changes, blurred vision, eye pain, photophobia, jaundice. Cardiovascular: No chest pain, heart palpitations. Respiratory: No cough, SOB, wheezing, chest discomfort, orthopnea. Gastrointestinal:    Genitourinary: No dysuria, bleeding, discharge, pyuria. Musculoskeletal: No weakness, arthralgias, wasting. Endo: No sweats. Heme: No bruising, easy bleeding. Allergies: As noted. Neurological: Cranial nerves intact. Alert and oriented. Gait not assessed. Psychiatric:  No anxiety, depression, hallucinations. Visit Vitals    /79    Pulse 82    Temp 98.6 °F (37 °C)    Resp 18    SpO2 96%       Physical Assessment:     constitutional: appearance: well developed, well nourished, normal habitus, no deformities, in no acute distress. skin: inspection: no rashes, ulcers, icterus or other lesions; no clubbing or telangiectasias. palpation: no induration or subcutaneos nodules.    eyes: inspection: normal conjunctivae and lids; no jaundice pupils: normal  ENMT: mouth: normal oral mucosa,lips and gums; good dentition. oropharynx: normal tongue, hard and soft palate; posterior pharynx without erithema, exudate or lesions. neck: thyroid: normal size, consistency and position; no masses or tenderness. respiratory: effort: normal chest excursion; no intercostal retraction or accessory muscle use. cardiovascular: abdominal aorta: normal size and position; no bruits. palpation: PMI of normal size and position; normal rhythm; no thrill or murmurs. abdominal: abdomen: normal consistency; no tenderness or masses. hernias: no hernias appreciated. liver: normal size and consistency. spleen: not palpable. rectal: hemoccult/guaiac: not performed. musculoskeletal: digits and nails: no clubbing, cyanosis, petechiae or other inflammatory conditions. gait: normal gait and station head and neck: normal range of motion; no pain, crepitation or contracture. spine/ribs/pelvis: normal range of motion; no pain, deformity or contracture. neurologic: cranial nerves: II-XII normal.   psychiatric: judgement/insight: within normal limits. memory: within normal limits for recent and remote events. mood and affect: no evidence of depression, anxiety or agitation. orientation: oriented to time, space and person. Basic Metabolic Profile   No results for input(s): NA, K, CL, CO2, BUN, GLU, CA, MG, PHOS in the last 72 hours. No lab exists for component: CREAT      CBC w/Diff    No results for input(s): WBC, RBC, HGB, HCT, MCV, MCH, MCHC, RDW, PLT, HGBEXT, HCTEXT, PLTEXT in the last 72 hours. No lab exists for component: MPV No results for input(s): GRANS, LYMPH, EOS, PRO, MYELO, METAS, BLAST in the last 72 hours. No lab exists for component: MONO, BASO     Hepatic Function   No results for input(s): ALB, TP, TBILI, GPT, SGOT, AP, AML, LPSE in the last 72 hours.     No lab exists for component: DBILI     Coanarinder   Recent Labs      05/31/18   1202   INR  2.2*           Olamide Henning, MD  [facility] [address] 1946, [address] [facility]  Cell: [phone]  Direct pager: [phone]  [email or web address]  [email or web address]

## 2018-05-31 NOTE — DISCHARGE INSTRUCTIONS
Upper GI Endoscopy: What to Expect at [address]  After you have an endoscopy, you will stay at the hospital or clinic for 1 to 2 hours. This will allow the medicine to wear off. You will be able to go home after your doctor or nurse checks to make sure you are not having any problems. You may have to stay overnight if you had treatment during the test. You may have a sore throat for a day or two after the test.  This care sheet gives you a general idea about what to expect after the test.  How can you care for yourself at home? Activity  · Rest as much as you need to after you go home. · You should be able to go back to your usual activities the day after the test.  Diet  · Follow your doctor's directions for eating after the test.  · Drink plenty of fluids (unless your doctor has told you not to). Medications  · If you have a sore throat the day after the test, use an over-the-counter spray to numb your throat. Follow-up care is a key part of your treatment and safety. Be sure to make and go to all appointments, and call your doctor if you are having problems. It's also a good idea to know your test results and keep a list of the medicines you take. When should you call for help? Call 911 anytime you think you may need emergency care. For example, call if:  ? · You passed out (loses consciousness). ? · You have trouble breathing. ? · You pass maroon or bloody stools. ?Call your doctor now or seek immediate medical care if:  ? · You have pain that does not get better after your take pain medicine. ? · You have new or worse belly pain. ? · You have blood in your stools. ? · You are sick to your stomach and cannot keep fluids down. ? · You have a fever. ? · You cannot pass stools or gas. ? Watch closely for changes in your health, and be sure to contact your doctor if:  ? · Your throat still hurts after a day or two. ? · You do not get better as expected.    Where can you learn more?  Go to http://bc-angelina.info/. Enter [phone] in the search box to learn more about \"Upper GI Endoscopy: What to Expect at Home. \"  Current as of: May 12, 2017  Content Version: 11.4  © 7411-5045 [employer]. Care instructions adapted under license by [employer] (which disclaims liability or warranty for this information). If you have questions about a medical condition or this instruction, always ask your healthcare professional. [place] [address] any warranty or liability for your use of this information. DISCHARGE SUMMARY from Nurse    PATIENT INSTRUCTIONS:    After general anesthesia or intravenous sedation, for 24 hours or while taking prescription Narcotics:  · Limit your activities  · Do not drive and operate hazardous machinery  · Do not make important personal or business decisions  · Do  not drink alcoholic beverages  · If you have not urinated within 8 hours after discharge, please contact your surgeon on call. Report the following to your surgeon:  · Excessive pain, swelling, redness or odor of or around the surgical area  · Temperature over 100.5  · Nausea and vomiting lasting longer than 4 hours or if unable to take medications  · Any signs of decreased circulation or nerve impairment to extremity: change in color, persistent  numbness, tingling, coldness or increase pain  · Any questions    What to do at Home:  Recommended activity: Activity as tolerated and no driving for today      These are general instructions for a healthy lifestyle:    No smoking/ No tobacco products/ Avoid exposure to second hand smoke  Surgeon General's Warning:  Quitting smoking now greatly reduces serious risk to your health.     Obesity, smoking, and sedentary lifestyle greatly increases your risk for illness    A healthy diet, regular physical exercise & weight monitoring are important for maintaining a healthy lifestyle    You may be retaining fluid if you have a history of heart failure or if you experience any of the following symptoms:  Weight gain of 3 pounds or more overnight or 5 pounds in a week, increased swelling in our hands or feet or shortness of breath while lying flat in bed. Please call your doctor as soon as you notice any of these symptoms; do not wait until your next office visit. Recognize signs and symptoms of STROKE:    F-face looks uneven    A-arms unable to move or move unevenly    S-speech slurred or non-existent    T-time-call 911 as soon as signs and symptoms begin-DO NOT go       Back to bed or wait to see if you get better-TIME IS BRAIN. Warning Signs of HEART ATTACK     Call 911 if you have these symptoms:   Chest discomfort. Most heart attacks involve discomfort in the center of the chest that lasts more than a few minutes, or that goes away and comes back. It can feel like uncomfortable pressure, squeezing, fullness, or pain.  Discomfort in other areas of the upper body. Symptoms can include pain or discomfort in one or both arms, the back, neck, jaw, or stomach.  Shortness of breath with or without chest discomfort.  Other signs may include breaking out in a cold sweat, nausea, or lightheadedness. Don't wait more than five minutes to call 911 - MINUTES MATTER! Fast action can save your life. Calling 911 is almost always the fastest way to get lifesaving treatment. Emergency Medical Services staff can begin treatment when they arrive -- up to an hour sooner than if someone gets to the hospital by car. The discharge information has been reviewed with the patient. The patient verbalized understanding. Discharge medications reviewed with the patient and appropriate educational materials and side effects teaching were provided.   ___________________________________________________________________________________________________________________________________

## 2018-05-31 NOTE — ANESTHESIA POSTPROCEDURE EVALUATION
Post-Anesthesia Evaluation and Assessment    Patient: Annabel Bañuelos MRN: [id number]  SSN: [id number]    YOB: 1955  Age: 65 y.o. Sex: female       Cardiovascular Function/Vital Signs  Visit Vitals    /87    Pulse 80    Temp 36.7 °C (98.1 °F)    Resp 18    SpO2 98%       Patient is status post MAC anesthesia for Procedure(s):  ENDOSCOPY. Nausea/Vomiting: None    Postoperative hydration reviewed and adequate. Pain:  Pain Scale 1: Numeric (0 - 10) (05/31/18 1217)  Pain Intensity 1: 7 (05/31/18 1148)   Managed    Neurological Status: At baseline    Mental Status and Level of Consciousness: Arousable    Pulmonary Status:   O2 Device: Room air (05/31/18 1430)   Adequate oxygenation and airway patent    Complications related to anesthesia: None    Post-anesthesia assessment completed.  No concerns    Signed By: Lenny Sanchez MD     May 31, 2018

## 2018-05-31 NOTE — PROGRESS NOTES
[email or web address] [address]  [place], [address]      Brief Procedure Note    Lilliam Nielsen  [birth year]  [id number]    Date of Procedure: 5/31/2018    Preoperative diagnosis: DX    Postoperative diagnosis: normal exam.    Type of Anesthesia: MAC (Monitored anesthesia care)    Description of findings: same as post op dx    Procedure: Procedure(s):  ENDOSCOPY    :  Dr. Lulú Gonzalez MD    Assistant(s): Endoscopy Technician-1: Ken Morejon  Endoscopy RN-1: Gail Calero RN; Brook Dumont RN    EBL:None    Specimens: * No specimens in log *    Findings: See printed and scanned procedure note    Complications: None    Dr. Lulú Gonzalez MD  5/31/2018  2:33 PM

## 2018-05-31 NOTE — IP AVS SNAPSHOT
[address] 61 [facility] Patient: Shanna Jimenez MRN: [id number] IID:[id number] About your hospitalization You were admitted on:  May 31, 2018 You last received care in the:  [facility] PHASE 2 RECOVERY You were discharged on:  May 31, 2018 Why you were hospitalized Your primary diagnosis was:  Not on File Follow-up Information Follow up With Details Comments Contact Info MD Jag Norton 27 3860 [facility] [address] 
[phone] Yanira Casillas MD  Follow up as instructed [address] 200 [facility] 
[phone] Your Scheduled Appointments Thursday May 31, 2018 ENDOSCOPY with Yanira Casillas MD  
[facility] ENDOSCOPY [address]) 920 [facility] [address] Discharge Orders None A check severiano indicates which time of day the medication should be taken. My Medications CONTINUE taking these medications Instructions Each Dose to Equal  
 Morning Noon Evening Bedtime  
 amLODIPine 2.5 mg tablet Commonly known as:  Sundra Boon Your last dose was: Your next dose is: Take 2.2 mg by mouth daily. 2.2 mg  
    
   
   
   
  
 aspirin delayed-release 81 mg tablet Your last dose was: Your next dose is: Take  by mouth daily. biotin 2,500 mcg Tab Your last dose was: Your next dose is: Take  by mouth. CALCIUM 600 600 mg calcium (1,500 mg) tablet Your last dose was: Your next dose is: Take 600 mg by mouth once. 600 mg  
    
   
   
   
  
 CO Q-10 100 mg capsule Generic drug:  co-enzyme Q-10 Your last dose was: Your next dose is: Take 100 mg by mouth daily.   
 100 mg  
    
   
   
   
  
 COUMADIN 7.5 mg tablet Generic drug:  warfarin Your last dose was: Your next dose is: Take 7.5 mg by mouth once as needed (7.5 mg x 5 days then 5mg for 1 day then repeat). 7.5 mg  
    
   
   
   
  
 CYMBALTA 60 mg capsule Generic drug:  DULoxetine Your last dose was: Your next dose is: Take 30 mg by mouth daily. 30 mg  
    
   
   
   
  
 DORYX 75 mg Tbec Generic drug:  doxycycline hyclate Your last dose was: Your next dose is: Take 30 mg by mouth daily. 30 mg FISH OIL 1,000 mg Cap Generic drug:  omega-3 fatty acids-vitamin e Your last dose was: Your next dose is: Take 1 Cap by mouth daily. 1 Cap  
    
   
   
   
  
 furosemide 20 mg tablet Commonly known as:  LASIX Your last dose was: Your next dose is: Take  by mouth daily. Iron 325 mg (65 mg iron) tablet Generic drug:  ferrous sulfate Your last dose was: Your next dose is: Take  by mouth Daily (before breakfast). LIPOFEN 150 mg Cap Generic drug:  Fenofibrate Your last dose was: Your next dose is: Take  by mouth daily. LIVALO 4 mg Tab tablet Generic drug:  pitavastatin calcium Your last dose was: Your next dose is: Take 4 mg by mouth daily. 4 mg  
    
   
   
   
  
 magnesium 250 mg Tab Your last dose was: Your next dose is: Take  by mouth daily. metFORMIN 500 mg tablet Commonly known as:  GLUCOPHAGE Your last dose was: Your next dose is: Take  by mouth two (2) times daily (with meals). metoprolol tartrate 25 mg tablet Commonly known as:  LOPRESSOR Your last dose was: Your next dose is: Take 0.5 Tabs by mouth two (2) times a day. 12.5 mg NexIUM 40 mg capsule Generic drug:  esomeprazole Your last dose was: Your next dose is: Take 40 mg by mouth daily. 40 mg PERCOCET  mg per tablet Generic drug:  oxyCODONE-acetaminophen Your last dose was: Your next dose is: Take 1 Tab by mouth as needed. 1 Tab  
    
   
   
   
  
 polyethylene glycol 17 gram/dose powder Commonly known as:  Marek Holster Your last dose was: Your next dose is: Take 17 g by mouth daily. 1 tablespoon with 8 oz of water daily 17 g  
    
   
   
   
  
 senna-docusate 8.6-50 mg per tablet Commonly known as:  Lonia Triny Your last dose was: Your next dose is: Take 1 Tab by mouth daily. 1 Tab  
    
   
   
   
  
 traZODone 100 mg tablet Commonly known as:  Harrah Macadamia Your last dose was: Your next dose is: Take 100 mg by mouth nightly. 100 mg  
    
   
   
   
  
 VITAMIN B-12 2,000 mcg Tber Generic drug:  cyanocobalamin Your last dose was: Your next dose is: Take 2,000 mcg by mouth once. 2000 mcg VITAMIN D3 1,000 unit tablet Generic drug:  cholecalciferol Your last dose was: Your next dose is: Take  by mouth daily. Opioid Education Prescription Opioids: What You Need to Know: 
 
Prescription opioids can be used to help relieve moderate-to-severe pain and are often prescribed following a surgery or injury, or for certain health conditions. These medications can be an important part of treatment but also come with serious risks. Opioids are strong pain medicines. Examples include hydrocodone, oxycodone, fentanyl, and morphine. Heroin is an example of an illegal opioid.   It is important to work with your health care provider to make sure you are getting the safest, most effective care. WHAT ARE THE RISKS AND SIDE EFFECTS OF OPIOID USE? Prescription opioids carry serious risks of addiction and overdose, especially with prolonged use. An opioid overdose, often marked by slow breathing, can cause sudden death. The use of prescription opioids can have a number of side effects as well, even when taken as directed. · Tolerance-meaning you might need to take more of a medication for the same pain relief · Physical dependence-meaning you have symptoms of withdrawal when the medication is stopped. Withdrawal symptoms can include nausea, sweating, chills, diarrhea, stomach cramps, and muscle aches. Withdrawal can last up to several weeks, depending on which drug you took and how long you took it. · Increased sensitivity to pain · Constipation · Nausea, vomiting, and dry mouth · Sleepiness and dizziness · Confusion · Depression · Low levels of testosterone that can result in lower sex drive, energy, and strength · Itching and sweating RISKS ARE GREATER WITH:      
· History of drug misuse, substance use disorder, or overdose · Mental health conditions (such as depression or anxiety) · Sleep apnea · Older age (72 years or older) · Pregnancy Avoid alcohol while taking prescription opioids. Also, unless specifically advised by your health care provider, medications to avoid include: · Benzodiazepines (such as Xanax or Valium) · Muscle relaxants (such as Soma or Flexeril) · Hypnotics (such as Ambien or Lunesta) · Other prescription opioids KNOW YOUR OPTIONS Talk to your health care provider about ways to manage your pain that don't involve prescription opioids. Some of these options may actually work better and have fewer risks and side effects. Options may include: 
· Pain relievers such as acetaminophen, ibuprofen, and naproxen · Some medications that are also used for depression or seizures · Physical therapy and exercise · Counseling to help patients learn how to cope better with triggers of pain and stress. · Application of heat or cold compress · Massage therapy · Relaxation techniques Be Informed Make sure you know the name of your medication, how much and how often to take it, and its potential risks & side effects. IF YOU ARE PRESCRIBED OPIOIDS FOR PAIN: 
· Never take opioids in greater amounts or more often than prescribed. Remember the goal is not to be pain-free but to manage your pain at a tolerable level. · Follow up with your primary care provider to: · Work together to create a plan on how to manage your pain. · Talk about ways to help manage your pain that don't involve prescription opioids. · Talk about any and all concerns and side effects. · Help prevent misuse and abuse. · Never sell or share prescription opioids · Help prevent misuse and abuse. · Store prescription opioids in a secure place and out of reach of others (this may include visitors, children, friends, and family). · Safely dispose of unused/unwanted prescription opioids: Find your community drug take-back program or your pharmacy mail-back program, or flush them down the toilet, following guidance from the Food and Drug Administration (www.fda.gov/Drugs/ResourcesForYou). · Visit www.cdc.gov/drugoverdose to learn about the risks of opioid abuse and overdose. · If you believe you may be struggling with addiction, tell your health care provider and ask for guidance or call [address] [place] [address] at 2-640-008-GPHS. Discharge Instructions Upper GI Endoscopy: What to Expect at [facility] Your Recovery After you have an endoscopy, you will stay at the hospital or clinic for 1 to 2 hours. This will allow the medicine to wear off.  You will be able to go home after your doctor or nurse checks to make sure you are not having any problems. You may have to stay overnight if you had treatment during the test. You may have a sore throat for a day or two after the test. 
This care sheet gives you a general idea about what to expect after the test. 
How can you care for yourself at home? Activity · Rest as much as you need to after you go home. · You should be able to go back to your usual activities the day after the test. 
Diet · Follow your doctor's directions for eating after the test. 
· Drink plenty of fluids (unless your doctor has told you not to). Medications · If you have a sore throat the day after the test, use an over-the-counter spray to numb your throat. Follow-up care is a key part of your treatment and safety. Be sure to make and go to all appointments, and call your doctor if you are having problems. It's also a good idea to know your test results and keep a list of the medicines you take. When should you call for help? Call 911 anytime you think you may need emergency care. For example, call if: 
? · You passed out (loses consciousness). ? · You have trouble breathing. ? · You pass maroon or bloody stools. ?Call your doctor now or seek immediate medical care if: 
? · You have pain that does not get better after your take pain medicine. ? · You have new or worse belly pain. ? · You have blood in your stools. ? · You are sick to your stomach and cannot keep fluids down. ? · You have a fever. ? · You cannot pass stools or gas. ? Watch closely for changes in your health, and be sure to contact your doctor if: 
? · Your throat still hurts after a day or two. ? · You do not get better as expected. Where can you learn more? Go to http://bc-angelina.info/. Enter ([phone] 657-405 in the search box to learn more about \"Upper GI Endoscopy: What to Expect at Home. \" Current as of: May 12, 2017 Content Version: 11.4 © 5774-8176 Healthwise, [employer]. Care instructions adapted under license by [employer] (which disclaims liability or warranty for this information). If you have questions about a medical condition or this instruction, always ask your healthcare professional. [address] any warranty or liability for your use of this information. DISCHARGE SUMMARY from Nurse PATIENT INSTRUCTIONS: 
 
After general anesthesia or intravenous sedation, for 24 hours or while taking prescription Narcotics: · Limit your activities · Do not drive and operate hazardous machinery · Do not make important personal or business decisions · Do  not drink alcoholic beverages · If you have not urinated within 8 hours after discharge, please contact your surgeon on call. Report the following to your surgeon: 
· Excessive pain, swelling, redness or odor of or around the surgical area · Temperature over 100.5 · Nausea and vomiting lasting longer than 4 hours or if unable to take medications · Any signs of decreased circulation or nerve impairment to extremity: change in color, persistent  numbness, tingling, coldness or increase pain · Any questions What to do at Home: 
Recommended activity: Activity as tolerated and no driving for today These are general instructions for a healthy lifestyle: No smoking/ No tobacco products/ Avoid exposure to second hand smoke Surgeon General's Warning:  Quitting smoking now greatly reduces serious risk to your health. Obesity, smoking, and sedentary lifestyle greatly increases your risk for illness A healthy diet, regular physical exercise & weight monitoring are important for maintaining a healthy lifestyle You may be retaining fluid if you have a history of heart failure or if you experience any of the following symptoms:  Weight gain of 3 pounds or more overnight or 5 pounds in a week, increased swelling in our hands or feet or shortness of breath while lying flat in bed. Please call your doctor as soon as you notice any of these symptoms; do not wait until your next office visit. Recognize signs and symptoms of STROKE: 
 
F-face looks uneven A-arms unable to move or move unevenly S-speech slurred or non-existent T-time-call 911 as soon as signs and symptoms begin-DO NOT go Back to bed or wait to see if you get better-TIME IS BRAIN. Warning Signs of HEART ATTACK Call 911 if you have these symptoms: 
? Chest discomfort. Most heart attacks involve discomfort in the center of the chest that lasts more than a few minutes, or that goes away and comes back. It can feel like uncomfortable pressure, squeezing, fullness, or pain. ? Discomfort in other areas of the upper body. Symptoms can include pain or discomfort in one or both arms, the back, neck, jaw, or stomach. ? Shortness of breath with or without chest discomfort. ? Other signs may include breaking out in a cold sweat, nausea, or lightheadedness. Don't wait more than five minutes to call [address]! Fast action can save your life. Calling 911 is almost always the fastest way to get lifesaving treatment. Emergency Medical Services staff can begin treatment when they arrive  up to an hour sooner than if someone gets to the hospital by car. The discharge information has been reviewed with the patient. The patient verbalized understanding. Discharge medications reviewed with the patient and appropriate educational materials and side effects teaching were provided. ___________________________________________________________________________________________________________________________________ Introducing [facility]! [employer] introduces WISHI patient portal. Now you can access parts of your medical record, email your doctor's office, and request medication refills online.    
 
1. In your internet browser, go to [email or web address] 2. Click on the First Time User? Click Here link in the Sign In box. You will see the New Member Sign Up page. 3. Enter your Jumpido Access Code exactly as it appears below. You will not need to use this code after youve completed the sign-up process. If you do not sign up before the expiration date, you must request a new code. · Jumpido Access Code: [email or web address] Expires: 8/29/2018 10:28 AM 
 
4. Enter the last four digits of your Social Security Number (xxxx) and Date of Birth (mm/dd/yyyy) as indicated and click Submit. You will be taken to the next sign-up page. 5. Create a Arkadiumt ID. This will be your Jumpido login ID and cannot be changed, so think of one that is secure and easy to remember. 6. Create a Jumpido password. You can change your password at any time. 7. Enter your Password Reset Question and Answer. This can be used at a later time if you forget your password. 8. Enter your e-mail address. You will receive e-mail notification when new information is available in [address] 9. Click Sign Up. You can now view and download portions of your medical record. 10. Click the Download Summary menu link to download a portable copy of your medical information. If you have questions, please visit the Frequently Asked Questions section of the Jumpido website. Remember, Jumpido is NOT to be used for urgent needs. For medical emergencies, dial 911. Now available from your iPhone and Android! Introducing Bony Hartmann As a [facility] patient, I wanted to make you aware of our electronic visit tool called Bony Hartmann. [facility] 24/7 allows you to connect within minutes with a medical provider 24 hours a day, seven days a week via a mobile device or tablet or logging into a secure website from your computer. You can access Bony Hartmann from anywhere in the [place]. A virtual visit might be right for you when you have a simple condition and feel like you just dont want to get out of bed, or cant get away from work for an appointment, when your [facility] is not available (evenings, weekends or holidays), or when youre out of town and need minor care. Electronic visits cost only $49 and if the [facility] 24/7 provider determines a prescription is needed to treat your condition, one can be electronically transmitted to a nearby pharmacy*. Please take a moment to enroll today if you have not already done so. The enrollment process is free and takes just a few minutes. To enroll, please download the SHE[facility] 24/[employer] zaina to your tablet or phone, or visit [email or web address] to enroll on your computer. And, as an [address] [place] [address] patient with a [employer] account, the results of your visits will be scanned into your electronic medical record and your primary care provider will be able to view the scanned results. We urge you to continue to see your regular [facility] provider for your ongoing medical care. And while your primary care provider may not be the one available when you seek a [facility] virtual visit, the peace of mind you get from getting a real diagnosis real time can be priceless. For more information on [facility], view our Frequently Asked Questions (FAQs) at [email or web address]. Sincerely, 
 
Sally Gorman MD 
Chief Medical Officer Janeth Rosales *:  certain medications cannot be prescribed via [facility] Providers Seen During Your Hospitalization Provider Specialty Primary office phone Nevaeh Yin MD Gastroenterology [phone] Your Primary Care Physician (PCP) Primary Care Physician Office Phone Office Fax Jaquelin Patterson [phone] You are allergic to the following Allergen Reactions Latex Other (comments) Blisters and bleeding from blisters. Nitroglycerin Other (comments) Patient states \"that when given medication last time she went into arrest\" Morphine Shortness of Breath Sulfa (Sulfonamide Antibiotics) Nausea Only Tetanus Vaccines And Toxoid Swelling Other (comments) fever Recent Documentation OB Status Smoking Status Hysterectomy Current Every Day Smoker Emergency Contacts Name Discharge Info Relation Home Work Mobile [address] CAREGIVER [3] Spouse [3] [phone] [address] CAREGIVER [3] Daughter [21] [phone] Berta Phipps DISCHARGE CAREGIVER [3] Other Relative [6] [phone] Patient Belongings The following personal items are in your possession at time of discharge: 
  Dental Appliances: None  Visual Aid: None Please provide this summary of care documentation to your next provider. Signatures-by signing, you are acknowledging that this After Visit Summary has been reviewed with you and you have received a copy. Patient Signature:  ____________________________________________________________ Date:  ____________________________________________________________  
  
Niels Monaco Provider Signature:  ____________________________________________________________ Date:  ____________________________________________________________

## 2018-06-01 ENCOUNTER — HOSPITAL ENCOUNTER (OUTPATIENT)
Age: 63
Setting detail: OUTPATIENT SURGERY
Discharge: HOME | End: 2018-06-01
Attending: INTERNAL MEDICINE | Admitting: INTERNAL MEDICINE
Payer: MEDICARE

## 2018-06-01 ENCOUNTER — ANESTHESIA (OUTPATIENT)
Dept: ENDOSCOPY | Age: 63
End: 2018-06-01
Payer: MEDICARE

## 2018-06-01 ENCOUNTER — ANESTHESIA EVENT (OUTPATIENT)
Dept: ENDOSCOPY | Age: 63
End: 2018-06-01
Payer: MEDICARE

## 2018-06-01 VITALS
RESPIRATION RATE: 16 BRPM | HEART RATE: 74 BPM | SYSTOLIC BLOOD PRESSURE: 108 MMHG | WEIGHT: 184 LBS | BODY MASS INDEX: 27.89 KG/M2 | OXYGEN SATURATION: 96 % | TEMPERATURE: 98.2 F | DIASTOLIC BLOOD PRESSURE: 68 MMHG | HEIGHT: 68 IN

## 2018-06-01 LAB
GLUCOSE BLD STRIP.AUTO-MCNC: 111 MG/DL (ref 70–110)
GLUCOSE BLD STRIP.AUTO-MCNC: 92 MG/DL (ref 70–110)

## 2018-06-01 PROCEDURE — 74011250636 HC RX REV CODE- 250/636: Performed by: ANESTHESIOLOGY

## 2018-06-01 PROCEDURE — 74011000250 HC RX REV CODE- 250

## 2018-06-01 PROCEDURE — 77030013992 HC SNR POLYP ENDOSC BSC -B: Performed by: INTERNAL MEDICINE

## 2018-06-01 PROCEDURE — 77030018846 HC SOL IRR STRL H20 ICUM -A: Performed by: INTERNAL MEDICINE

## 2018-06-01 PROCEDURE — 76060000031 HC ANESTHESIA FIRST 0.5 HR: Performed by: INTERNAL MEDICINE

## 2018-06-01 PROCEDURE — 76040000019: Performed by: INTERNAL MEDICINE

## 2018-06-01 PROCEDURE — 74011250636 HC RX REV CODE- 250/636

## 2018-06-01 PROCEDURE — 77030009426 HC FCPS BIOP ENDOSC BSC -B: Performed by: INTERNAL MEDICINE

## 2018-06-01 PROCEDURE — 77030008565 HC TBNG SUC IRR ERBE -B: Performed by: INTERNAL MEDICINE

## 2018-06-01 PROCEDURE — 82962 GLUCOSE BLOOD TEST: CPT

## 2018-06-01 RX ORDER — MAGNESIUM SULFATE 100 %
4 CRYSTALS MISCELLANEOUS AS NEEDED
Status: DISCONTINUED | OUTPATIENT
Start: 2018-06-01 | End: 2018-06-01 | Stop reason: SDUPTHER

## 2018-06-01 RX ORDER — ONDANSETRON 2 MG/ML
4 INJECTION INTRAMUSCULAR; INTRAVENOUS ONCE
Status: DISCONTINUED | OUTPATIENT
Start: 2018-06-01 | End: 2018-06-01 | Stop reason: SDUPTHER

## 2018-06-01 RX ORDER — ONDANSETRON 2 MG/ML
4 INJECTION INTRAMUSCULAR; INTRAVENOUS ONCE
Status: DISCONTINUED | OUTPATIENT
Start: 2018-06-01 | End: 2018-06-01 | Stop reason: HOSPADM

## 2018-06-01 RX ORDER — PROPOFOL 10 MG/ML
INJECTION, EMULSION INTRAVENOUS AS NEEDED
Status: DISCONTINUED | OUTPATIENT
Start: 2018-06-01 | End: 2018-06-01 | Stop reason: HOSPADM

## 2018-06-01 RX ORDER — DEXTROSE 50 % IN WATER (D50W) INTRAVENOUS SYRINGE
25-50 AS NEEDED
Status: DISCONTINUED | OUTPATIENT
Start: 2018-06-01 | End: 2018-06-01 | Stop reason: SDUPTHER

## 2018-06-01 RX ORDER — SODIUM CHLORIDE, SODIUM LACTATE, POTASSIUM CHLORIDE, CALCIUM CHLORIDE 600; 310; 30; 20 MG/100ML; MG/100ML; MG/100ML; MG/100ML
125 INJECTION, SOLUTION INTRAVENOUS CONTINUOUS
Status: DISCONTINUED | OUTPATIENT
Start: 2018-06-01 | End: 2018-06-01 | Stop reason: HOSPADM

## 2018-06-01 RX ORDER — INSULIN LISPRO 100 [IU]/ML
INJECTION, SOLUTION INTRAVENOUS; SUBCUTANEOUS ONCE
Status: DISCONTINUED | OUTPATIENT
Start: 2018-06-01 | End: 2018-06-01 | Stop reason: HOSPADM

## 2018-06-01 RX ORDER — DEXTROSE 50 % IN WATER (D50W) INTRAVENOUS SYRINGE
25-50 AS NEEDED
Status: DISCONTINUED | OUTPATIENT
Start: 2018-06-01 | End: 2018-06-01 | Stop reason: HOSPADM

## 2018-06-01 RX ORDER — MAGNESIUM SULFATE 100 %
4 CRYSTALS MISCELLANEOUS AS NEEDED
Status: DISCONTINUED | OUTPATIENT
Start: 2018-06-01 | End: 2018-06-01 | Stop reason: HOSPADM

## 2018-06-01 RX ORDER — PROPOFOL 10 MG/ML
INJECTION, EMULSION INTRAVENOUS
Status: DISCONTINUED | OUTPATIENT
Start: 2018-06-01 | End: 2018-06-01 | Stop reason: HOSPADM

## 2018-06-01 RX ORDER — SODIUM CHLORIDE 0.9 % (FLUSH) 0.9 %
5-10 SYRINGE (ML) INJECTION AS NEEDED
Status: DISCONTINUED | OUTPATIENT
Start: 2018-06-01 | End: 2018-06-01 | Stop reason: HOSPADM

## 2018-06-01 RX ORDER — INSULIN LISPRO 100 [IU]/ML
INJECTION, SOLUTION INTRAVENOUS; SUBCUTANEOUS ONCE
Status: DISCONTINUED | OUTPATIENT
Start: 2018-06-01 | End: 2018-06-01 | Stop reason: SDUPTHER

## 2018-06-01 RX ORDER — LIDOCAINE HYDROCHLORIDE 20 MG/ML
INJECTION, SOLUTION EPIDURAL; INFILTRATION; INTRACAUDAL; PERINEURAL AS NEEDED
Status: DISCONTINUED | OUTPATIENT
Start: 2018-06-01 | End: 2018-06-01 | Stop reason: HOSPADM

## 2018-06-01 RX ADMIN — SODIUM CHLORIDE, SODIUM LACTATE, POTASSIUM CHLORIDE, AND CALCIUM CHLORIDE 125 ML/HR: 600; 310; 30; 20 INJECTION, SOLUTION INTRAVENOUS at 13:04

## 2018-06-01 RX ADMIN — LIDOCAINE HYDROCHLORIDE 60 MG: 20 INJECTION, SOLUTION EPIDURAL; INFILTRATION; INTRACAUDAL; PERINEURAL at 13:54

## 2018-06-01 RX ADMIN — PROPOFOL 100 MCG/KG/MIN: 10 INJECTION, EMULSION INTRAVENOUS at 13:54

## 2018-06-01 RX ADMIN — PROPOFOL 100 MG: 10 INJECTION, EMULSION INTRAVENOUS at 13:54

## 2018-06-01 NOTE — IP AVS SNAPSHOT
[address] [facility] 
 
 
 [address] Patient: Jeannie Torres MRN: [id number] About your hospitalization You were admitted on:  June 1, 2018 You last received care in the:  [facility] PHASE 2 RECOVERY You were discharged on:  June 1, 2018 Why you were hospitalized Your primary diagnosis was:  Not on File Follow-up Information Follow up With Details Comments Contact Info MD Jag Hayden 27 [address] [facility] [address] 
[phone] Supa Carolina MD   [address] [place] [address] [address] 200 [facility] 
[phone] Discharge Orders None A check severiano indicates which time of day the medication should be taken. My Medications CONTINUE taking these medications Instructions Each Dose to Equal  
 Morning Noon Evening Bedtime  
 amLODIPine 2.5 mg tablet Commonly known as:  Tristan Bolden Your last dose was: Your next dose is: Take 2.5 mg by mouth daily. 2.5 mg  
    
   
   
   
  
 aspirin delayed-release 81 mg tablet Your last dose was: Your next dose is: Take  by mouth daily. biotin 2,500 mcg Tab Your last dose was: Your next dose is: Take  by mouth. CALCIUM 600 600 mg calcium (1,500 mg) tablet Your last dose was: Your next dose is: Take 600 mg by mouth once. 600 mg  
    
   
   
   
  
 CO Q-10 100 mg capsule Generic drug:  co-enzyme Q-10 Your last dose was: Your next dose is: Take 100 mg by mouth daily. 100 mg COUMADIN 7.5 mg tablet Generic drug:  warfarin Your last dose was: Your next dose is: Take 7.5 mg by mouth once as needed (7.5 mg x 5 days then 5mg for 1 day then repeat).   
 7.5 mg  
    
   
   
   
  
 CYMBALTA 60 mg capsule Generic drug:  DULoxetine Your last dose was: Your next dose is: Take 30 mg by mouth daily. 30 mg  
    
   
   
   
  
 DORYX 75 mg Tbec Generic drug:  doxycycline hyclate Your last dose was: Your next dose is: Take 30 mg by mouth daily. 30 mg FISH OIL 1,000 mg Cap Generic drug:  omega-3 fatty acids-vitamin e Your last dose was: Your next dose is: Take 1 Cap by mouth daily. 1 Cap  
    
   
   
   
  
 furosemide 20 mg tablet Commonly known as:  LASIX Your last dose was: Your next dose is: Take  by mouth daily. Iron 325 mg (65 mg iron) tablet Generic drug:  ferrous sulfate Your last dose was: Your next dose is: Take  by mouth Daily (before breakfast). LIPOFEN 150 mg Cap Generic drug:  Fenofibrate Your last dose was: Your next dose is: Take  by mouth daily. LIVALO 4 mg Tab tablet Generic drug:  pitavastatin calcium Your last dose was: Your next dose is: Take 4 mg by mouth daily. 4 mg  
    
   
   
   
  
 magnesium 250 mg Tab Your last dose was: Your next dose is: Take  by mouth daily. metFORMIN 500 mg tablet Commonly known as:  GLUCOPHAGE Your last dose was: Your next dose is: Take  by mouth two (2) times daily (with meals). metoprolol tartrate 25 mg tablet Commonly known as:  LOPRESSOR Your last dose was: Your next dose is: Take 0.5 Tabs by mouth two (2) times a day. 12.5 mg NexIUM 40 mg capsule Generic drug:  esomeprazole Your last dose was: Your next dose is: Take 40 mg by mouth daily.   
 40 mg  
 PERCOCET  mg per tablet Generic drug:  oxyCODONE-acetaminophen Your last dose was: Your next dose is: Take 1 Tab by mouth as needed. 1 Tab  
    
   
   
   
  
 polyethylene glycol 17 gram/dose powder Commonly known as:  Alvarez Mackey Your last dose was: Your next dose is: Take 17 g by mouth daily. 1 tablespoon with 8 oz of water daily 17 g  
    
   
   
   
  
 senna-docusate 8.6-50 mg per tablet Commonly known as:  Cherelle Sutherland Your last dose was: Your next dose is: Take 1 Tab by mouth daily. 1 Tab  
    
   
   
   
  
 traZODone 100 mg tablet Commonly known as:  Ubaldo Collinson Your last dose was: Your next dose is: Take 100 mg by mouth nightly. 100 mg  
    
   
   
   
  
 VITAMIN B-12 2,000 mcg Tber Generic drug:  cyanocobalamin Your last dose was: Your next dose is: Take 2,000 mcg by mouth once. 2000 mcg VITAMIN D3 1,000 unit tablet Generic drug:  cholecalciferol Your last dose was: Your next dose is: Take  by mouth daily. Opioid Education Prescription Opioids: What You Need to Know: 
 
Prescription opioids can be used to help relieve moderate-to-severe pain and are often prescribed following a surgery or injury, or for certain health conditions. These medications can be an important part of treatment but also come with serious risks. Opioids are strong pain medicines. Examples include hydrocodone, oxycodone, fentanyl, and morphine. Heroin is an example of an illegal opioid. It is important to work with your health care provider to make sure you are getting the safest, most effective care. WHAT ARE THE RISKS AND SIDE EFFECTS OF OPIOID USE?  
Prescription opioids carry serious risks of addiction and overdose, especially with prolonged use. An opioid overdose, often marked by slow breathing, can cause sudden death. The use of prescription opioids can have a number of side effects as well, even when taken as directed. · Tolerance-meaning you might need to take more of a medication for the same pain relief · Physical dependence-meaning you have symptoms of withdrawal when the medication is stopped. Withdrawal symptoms can include nausea, sweating, chills, diarrhea, stomach cramps, and muscle aches. Withdrawal can last up to several weeks, depending on which drug you took and how long you took it. · Increased sensitivity to pain · Constipation · Nausea, vomiting, and dry mouth · Sleepiness and dizziness · Confusion · Depression · Low levels of testosterone that can result in lower sex drive, energy, and strength · Itching and sweating RISKS ARE GREATER WITH:      
· History of drug misuse, substance use disorder, or overdose · Mental health conditions (such as depression or anxiety) · Sleep apnea · Older age (72 years or older) · Pregnancy Avoid alcohol while taking prescription opioids. Also, unless specifically advised by your health care provider, medications to avoid include: · Benzodiazepines (such as Xanax or Valium) · Muscle relaxants (such as Soma or Flexeril) · Hypnotics (such as Ambien or Lunesta) · Other prescription opioids KNOW YOUR OPTIONS Talk to your health care provider about ways to manage your pain that don't involve prescription opioids. Some of these options may actually work better and have fewer risks and side effects. Options may include: 
· Pain relievers such as acetaminophen, ibuprofen, and naproxen · Some medications that are also used for depression or seizures · Physical therapy and exercise · Counseling to help patients learn how to cope better with triggers of pain and stress. · Application of heat or cold compress · Massage therapy · Relaxation techniques Be Informed Make sure you know the name of your medication, how much and how often to take it, and its potential risks & side effects. IF YOU ARE PRESCRIBED OPIOIDS FOR PAIN: 
· Never take opioids in greater amounts or more often than prescribed. Remember the goal is not to be pain-free but to manage your pain at a tolerable level. · Follow up with your primary care provider to: · Work together to create a plan on how to manage your pain. · Talk about ways to help manage your pain that don't involve prescription opioids. · Talk about any and all concerns and side effects. · Help prevent misuse and abuse. · Never sell or share prescription opioids · Help prevent misuse and abuse. · Store prescription opioids in a secure place and out of reach of others (this may include visitors, children, friends, and family). · Safely dispose of unused/unwanted prescription opioids: Find your community drug take-back program or your pharmacy mail-back program, or flush them down the toilet, following guidance from the Food and Drug Administration (www.fda.gov/Drugs/ResourcesForYou). · Visit www.cdc.gov/drugoverdose to learn about the risks of opioid abuse and overdose. · If you believe you may be struggling with addiction, tell your health care provider and ask for guidance or call [employer] at 5-273-488-IMSD. Discharge Instructions Sigmoidoscopy: What to Expect at [facility] Your Recovery A sigmoidoscopy lets your doctor look inside the lower part of your large intestine. This is also called the colon. The doctor uses a lighted tube called a sigmoidoscope (or scope). This test let the doctor look for small growths (called polyps), cancer, bleeding, hemorrhoids, or other problems. The doctor also may have used the scope to remove polyps.  Or he or she may have used it to take tissue samples that need to be tested. You shouldn't have any pain after the procedure. But it is normal to pass gas. You may have mild discomfort from having gas. If your doctor removed polyps, you will likely need to schedule a colonoscopy to look at the whole colon. This care sheet gives you a general idea about how long it will take for you to recover. But each person recovers at a different pace. Follow the steps below to get better as quickly as possible. How can you care for yourself at home? Activity ? · Most people are able to return to work right away unless they have had a sedative during the procedure. ? · You may need someone to drive you home if you have had a sedative. In most cases, you can drive yourself home. Diet ? · You can eat your normal diet. If your stomach is upset, try bland, low-fat foods like plain rice, broiled chicken, toast, and yogurt. ? · Be sure to drink plenty of liquids to replace those you have lost during the preparation for the procedure. Exercise ? · You can return to normal exercise right away. ?Medicine ? · Your doctor will tell you if and when you can restart your medicines. He or she will also give you instructions about taking any new medicines. ? · If you take blood thinners, such as warfarin (Coumadin), clopidogrel (Plavix), or aspirin, be sure to talk to your doctor. He or she will tell you if and when to start taking those medicines again. Make sure that you understand exactly what your doctor wants you to do. Follow-up care is a key part of your treatment and safety. Be sure to make and go to all appointments, and call your doctor if you are having problems. It's also a good idea to know your test results and keep a list of the medicines you take. When should you call for help? Call your doctor now or seek immediate medical care if: 
? · You have new or worse belly pain. ? · You have blood in your stools. ?Watch closely for changes in your health, and be sure to contact your doctor if you have any problems. Where can you learn more? Go to http://bc-angelina.info/. Enter Y653 in the search box to learn more about \"Sigmoidoscopy: What to Expect at Home. \" Current as of: May 12, 2017 Content Version: 11.4 © 2159-6650 [employer]. Care instructions adapted under license by [employer] (which disclaims liability or warranty for this information). If you have questions about a medical condition or this instruction, always ask your healthcare professional. [place] [address] any warranty or liability for your use of this information. DISCHARGE SUMMARY from Nurse POST-PROCEDURE INSTRUCTIONS: 
 
Call your Physician if you: 
? Observe any excess bleeding. ? Develop a temperature over 100.5o F. 
? Experience abdominal, shoulder or chest pain. ? Notice any signs of decreased circulation or nerve impairment to an extremity such as a change in color, persistent numbness, tingling, coldness or increase in pain. ? Vomit blood or you have nausea and vomiting lasting longer than 4 hours. ? Are unable to take medications. ? Are unable to urinate within 8 hours after discharge following general anesthesia or intravenous sedation. For the next 24 hours after receiving general anesthesia or intravenous sedation, or while taking prescription Narcotics, limit your activities: 
? Do NOT drive a motor vehicle, operate hazard machinery or power tools, or perform tasks that require coordination. The medication you received during your procedure may have some effect on your mental awareness. ? Do NOT make important personal or business decisions. The medication you received during your procedure may have some effect on your mental awareness. ? Do NOT drink alcoholic beverages. These drinks do not mix well with the medications that have been given to you. ? Upon discharge from the hospital, you must be accompanied by a responsible adult. ? Resume your diet as directed by your physician. ? Resume medications as your physician has prescribed. ? Please give a list of your current medications to your Primary Care Provider. ? Please update this list whenever your medications are discontinued, doses are changed, or new medications (including over-the-counter products) are added. ? Please carry medication information at all times in case of emergency situations. These are general instructions for a healthy lifestyle: No smoking/ No tobacco products/ Avoid exposure to second hand smoke. ? Surgeon General's Warning:  Quitting smoking now greatly reduces serious risk to your health. Obesity, smoking, and a sedentary lifestyle greatly increase your risk for illness. ? A healthy diet, regular physical exercise & weight monitoring are important for maintaining a healthy lifestyle ? You may be retaining fluid if you have a history of heart failure or if you experience any of the following symptoms:  Weight gain of 3 pounds or more overnight or 5 pounds in a week, increased swelling in our hands or feet or shortness of breath while lying flat in bed. Please call your doctor as soon as you notice any of these symptoms; do not wait until your next office visit. Recognize signs and symptoms of STROKE: 
F  -  Face looks uneven A  -  Arms unable to move or move unevenly S  -  Speech slurred or non-existent T  -  Time to call 911 - as soon as signs and symptoms begin - DO NOT go back to bed or wait to see If you get better - TIME IS BRAIN. Colorectal Screening ? Colorectal cancer almost always develops from precancerous polyps (abnormal growths) in the colon or rectum. Screening tests can find precancerous polyps, so that they can be removed before they turn into cancer.  Screening tests can also find colorectal cancer early, when treatment works best. 
? Speak with your physician about when you should begin screening and how often you should be tested. MyChart Activation Thank you for requesting access to [employer]. Please follow the instructions below to securely access and download your online medical record. [employer] allows you to send messages to your doctor, view your test results, renew your prescriptions, schedule appointments, and more. How Do I Sign Up? 1. In your internet browser, go to [email or web address]. 2. Click on the First Time User? Click Here link in the Sign In box. You will see the New Member Sign Up page. 3. Enter your [employer] Access Code exactly as it appears below. You will not need to use this code after youve completed the sign-up process. If you do not sign up before the expiration date, you must request a new code. [employer] Access Code: [email or web address] Expires: 2018 10:28 AM (This is the date your [employer] access code will ) 4. Enter the last four digits of your Social Security Number (xxxx) and Date of Birth (mm/dd/yyyy) as indicated and click Submit. You will be taken to the next sign-up page. 5. Create a [employer] ID. This will be your [employer] login ID and cannot be changed, so think of one that is secure and easy to remember. 6. Create a [employer] password. You can change your password at any time. 7. Enter your Password Reset Question and Answer. This can be used at a later time if you forget your password. 8. Enter your e-mail address. You will receive e-mail notification when new information is available in [address] 9. Click Sign Up. You can now view and download portions of your medical record. 10. Click the Download Summary menu link to download a portable copy of your medical information. Additional Information If you have questions, please call [phone].  Remember, [address] is NOT to be used for urgent needs. For medical emergencies, dial 911. Educational references and/or instructions provided during this visit included: 
 
Diverticulosis APPOINTMENTS: 
 
Please make a follow-up appointment with your physician. Discharge information has been reviewed with the patient and responsible party. The patient and responsible party verbalized understanding. Introducing [facility]! Arianajn Joann introduces [employer] patient portal. Now you can access parts of your medical record, email your doctor's office, and request medication refills online. 1. In your internet browser, go to [email or web address] [email or web address] Delicious 2. Click on the First Time User? Click Here link in the Sign In box. You will see the New Member Sign Up page. 3. Enter your [employer] Access Code exactly as it appears below. You will not need to use this code after youve completed the sign-up process. If you do not sign up before the expiration date, you must request a new code. · [employer] Access Code: [email or web address] Expires: 8/29/2018 10:28 AM 
 
4. Enter the last four digits of your Social Security Number (xxxx) and Date of Birth (mm/dd/yyyy) as indicated and click Submit. You will be taken to the next sign-up page. 5. Create a [employer] ID. This will be your [employer] login ID and cannot be changed, so think of one that is secure and easy to remember. 6. Create a [employer] password. You can change your password at any time. 7. Enter your Password Reset Question and Answer. This can be used at a later time if you forget your password. 8. Enter your e-mail address. You will receive e-mail notification when new information is available in [address] 9. Click Sign Up. You can now view and download portions of your medical record. 10. Click the Download Summary menu link to download a portable copy of your medical information. If you have questions, please visit the Frequently Asked Questions section of the MyChart website. Remember, [employer] is NOT to be used for urgent needs. For medical emergencies, dial 911. Now available from your iPhone and Android! Introducing Bony Hartmann As a Pavithra Arboleda patient, I wanted to make you aware of our electronic visit tool called Bony Hartmann. Pavithra Playto 24/7 allows you to connect within minutes with a medical provider 24 hours a day, seven days a week via a mobile device or tablet or logging into a secure website from your computer. You can access Bony Hartmann from anywhere in the [place]. A virtual visit might be right for you when you have a simple condition and feel like you just dont want to get out of bed, or cant get away from work for an appointment, when your regular Pavithra Arboleda provider is not available (evenings, weekends or holidays), or when youre out of town and need minor care. Electronic visits cost only $49 and if the Pavithra Arboleda 24/7 provider determines a prescription is needed to treat your condition, one can be electronically transmitted to a nearby pharmacy*. Please take a moment to enroll today if you have not already done so. The enrollment process is free and takes just a few minutes. To enroll, please download the [employer] 24/7 zaina to your tablet or phone, or visit [email or web address] to enroll on your computer. And, as an [address] [place] [address] patient with a [employer] account, the results of your visits will be scanned into your electronic medical record and your primary care provider will be able to view the scanned results. We urge you to continue to see your regular Pavithra Arboleda provider for your ongoing medical care.   And while your primary care provider may not be the one available when you seek a Bony Hartmann virtual visit, the peace of mind you get from getting a real diagnosis real time can be priceless. For more information on Bony Mejiaivette, view our Frequently Asked Questions (FAQs) at [email or web address]. Sincerely, 
 
Yoselyn Dan MD 
Chief Medical Officer [address] *:  certain medications cannot be prescribed via Bony Hartmann Providers Seen During Your Hospitalization Provider Specialty Primary office phone Beverly MD [facility] [phone] Your Primary Care Physician (PCP) Primary Care Physician Office Phone Office Fax [facility] [phone] You are allergic to the following Allergen Reactions Latex Other (comments) Blisters and bleeding from blisters. Nitroglycerin Other (comments) Patient states \"that when given medication last time she went into arrest\" Morphine Shortness of Breath Sulfa (Sulfonamide Antibiotics) Nausea Only Tetanus Vaccines And Toxoid Swelling Other (comments) fever Recent Documentation Height Weight BMI OB Status Smoking Status 1.727 m 83.5 kg 27.98 kg/m2 Hysterectomy Current Every Day Smoker Emergency Contacts Name Discharge Info Relation Home Work Mobile [address] CAREGIVER [3] Spouse [3] [phone] [address] CAREGIVER [3] Daughter [21] [phone] Berta Phipps DISCHARGE CAREGIVER [3] Other Relative [6] [phone] Patient Belongings The following personal items are in your possession at time of discharge: 
  Dental Appliances: None  Visual Aid: Glasses Discharge Instructions Attachments/References DIVERTICULOSIS (ENGLISH) HEMORRHOIDS (ENGLISH) Patient Handouts Diverticulosis: Care Instructions Your Care Instructions In diverticulosis, pouches called diverticula form in the wall of the large intestine (colon). The pouches do not cause any pain or other symptoms. Most people who have diverticulosis do not know they have it. But the pouches sometimes bleed, and if they become infected, they can cause pain and other symptoms. When this happens, it is called diverticulitis. Diverticula form when pressure pushes the wall of the colon outward at certain weak points. A diet that is too low in fiber can cause diverticula. Follow-up care is a key part of your treatment and safety. Be sure to make and go to all appointments, and call your doctor if you are having problems. It's also a good idea to know your test results and keep a list of the medicines you take. How can you care for yourself at home? · Include fruits, leafy green vegetables, beans, and whole grains in your diet each day. These foods are high in fiber. · Take a fiber supplement, such as Citrucel or Metamucil, every day if needed. Read and follow all instructions on the label. · Drink plenty of fluids, enough so that your urine is light yellow or clear like water. If you have kidney, heart, or liver disease and have to limit fluids, talk with your doctor before you increase the amount of fluids you drink. · Get at least 30 minutes of exercise on most days of the week. Walking is a good choice. You also may want to do other activities, such as running, swimming, cycling, or playing tennis or team sports. · Cut out foods that cause gas, pain, or other symptoms. When should you call for help? Call your doctor now or seek immediate medical care if: 
? · You have belly pain. ? · You pass maroon or very bloody stools. ? · You have a fever. ? · You have nausea and vomiting. ? · You have unusual changes in your bowel movements or abdominal swelling. ? · You have burning pain when you urinate. ? · You have abnormal vaginal discharge. ? · You have shoulder pain. ? · You have cramping pain that does not get better when you have a bowel movement or pass gas. ? · You pass gas or stool from your urethra while urinating. ? Watch closely for changes in your health, and be sure to contact your doctor if you have any problems. Where can you learn more? Go to http://bc-angelina.info/. Enter S052 in the search box to learn more about \"Diverticulosis: Care Instructions. \" Current as of: May 12, 2017 Content Version: 11.4 © 0287-6630 [employer]. Care instructions adapted under license by [employer] (which disclaims liability or warranty for this information). If you have questions about a medical condition or this instruction, always ask your healthcare professional. [address] any warranty or liability for your use of this information. Hemorrhoids: Care Instructions Your Care Instructions Hemorrhoids are enlarged veins that develop in the anal canal. Bleeding during bowel movements, itching, swelling, and rectal pain are the most common symptoms. They can be uncomfortable at times, but hemorrhoids rarely are a serious problem. You can treat most hemorrhoids with simple changes to your diet and bowel habits. These changes include eating more fiber and not straining to pass stools. Most hemorrhoids do not need surgery or other treatment unless they are very large and painful or bleed a lot. Follow-up care is a key part of your treatment and safety. Be sure to make and go to all appointments, and call your doctor if you are having problems. It's also a good idea to know your test results and keep a list of the medicines you take. How can you care for yourself at home? · Sit in a few inches of warm water (sitz bath) 3 times a day and after bowel movements. The warm water helps with pain and itching. · Put ice on your anal area several times a day for 10 minutes at a time. Put a thin cloth between the ice and your skin. Follow this by placing a warm, wet towel on the area for another 10 to 20 minutes. · Take pain medicines exactly as directed. ¨ If the doctor gave you a prescription medicine for pain, take it as prescribed. ¨ If you are not taking a prescription pain medicine, ask your doctor if you can take an over-the-counter medicine. · Keep the anal area clean, but be gentle. Use water and a fragrance-free soap, such as [place], or use baby wipes or medicated pads, such as Tucks. · Wear cotton underwear and loose clothing to decrease moisture in the anal area. · Eat more fiber. Include foods such as whole-grain breads and cereals, raw vegetables, raw and dried fruits, and beans. · Drink plenty of fluids, enough so that your urine is light yellow or clear like water. If you have kidney, heart, or liver disease and have to limit fluids, talk with your doctor before you increase the amount of fluids you drink. · Use a stool softener that contains bran or psyllium. You can save money by buying bran or psyllium (available in bulk at most health food stores) and sprinkling it on foods or stirring it into fruit juice. Or you can use a product such as Metamucil or Hydrocil. · Practice healthy bowel habits. ¨ Go to the bathroom as soon as you have the urge. ¨ Avoid straining to pass stools. Relax and give yourself time to let things happen naturally. ¨ Do not hold your breath while passing stools. ¨ Do not read while sitting on the toilet. Get off the toilet as soon as you have finished. · Take your medicines exactly as prescribed. Call your doctor if you think you are having a problem with your medicine. When should you call for help? Call 911 anytime you think you may need emergency care. For example, call if: 
? · You pass maroon or very bloody stools. ?Call your doctor now or seek immediate medical care if: 
? · You have increased pain. ? · You have increased bleeding. ? Watch closely for changes in your health, and be sure to contact your doctor if: 
? · Your symptoms have not improved after 3 or 4 days. Where can you learn more? Go to http://bc-angelina.info/. Enter F228 in the search box to learn more about \"Hemorrhoids: Care Instructions. \" Current as of: May 12, 2017 Content Version: 11.4 © 2006-2017 [employer]. Care instructions adapted under license by [employer] (which disclaims liability or warranty for this information). If you have questions about a medical condition or this instruction, always ask your healthcare professional. [address] any warranty or liability for your use of this information. Please provide this summary of care documentation to your next provider. Signatures-by signing, you are acknowledging that this After Visit Summary has been reviewed with you and you have received a copy. Patient Signature:  ____________________________________________________________ Date:  ____________________________________________________________  
  
Tyler Clayton Provider Signature:  ____________________________________________________________ Date:  ____________________________________________________________

## 2018-06-01 NOTE — PROGRESS NOTES
[email or web address] [address]  [place], [address]      Brief Procedure Note    Chandrika Hardin  [birth year]  [id number]    Date of Procedure: 6/1/2018    Preoperative diagnosis: DX    Postoperative diagnosis: diverticulosis, hemorrhoids.      Type of Anesthesia: MAC (Monitored anesthesia care)    Description of findings: same as post op dx    Procedure: Procedure(s):  SIGMOIDOSCOPY FLEXIBLE    :  Dr. Juan Orellana MD    Assistant(s): Endoscopy Technician-1: Valery Mariano  Endoscopy RN-1: Felecia Gutierres RN    EBL:None    Specimens: * No specimens in log *    Findings: See printed and scanned procedure note    Complications: None    Dr. Juan Orellana MD  6/1/2018  2:14 PM

## 2018-06-01 NOTE — ANESTHESIA PREPROCEDURE EVALUATION
Anesthetic History   No history of anesthetic complications            Review of Systems / Medical History  Patient summary reviewed, nursing notes reviewed and pertinent labs reviewed    Pulmonary          Smoker         Neuro/Psych   Within defined limits           Cardiovascular              Past MI and CAD         GI/Hepatic/Renal     GERD: poorly controlled           Endo/Other    Diabetes: well controlled, type 2         Other Findings   Comments: Documentation of current medication  Current medications obtained, documented and obtained? YES      Risk Factors for Postoperative nausea/vomiting:       History of postoperative nausea/vomiting? NO       Female? YES       Motion sickness? NO       Intended opioid administration for postoperative analgesia? NO      Smoking Abstinence:  Current Smoker? NO  Elective Surgery? YES  Seen preoperatively by anesthesiologist or proxy prior to day of surgery? YES  Pt abstained from smoking 24 hours prior to anesthesia?  N/A    Preventive care/screening for High Blood Pressure:  Aged 18 years and older: YES  Screened for high blood pressure: YES  Patients with high blood pressure referred to primary care provider   for BP management: YES               Physical Exam    Airway  Mallampati: III  TM Distance: 4 - 6 cm  Neck ROM: normal range of motion   Mouth opening: Normal     Cardiovascular  Regular rate and rhythm,  S1 and S2 normal,  no murmur, click, rub, or gallop  Rhythm: regular  Rate: normal         Dental  No notable dental hx       Pulmonary  Breath sounds clear to auscultation               Abdominal  GI exam deferred       Other Findings            Anesthetic Plan    ASA: 3  Anesthesia type: MAC          Induction: Intravenous  Anesthetic plan and risks discussed with: Patient

## 2018-06-01 NOTE — ANESTHESIA POSTPROCEDURE EVALUATION
Post-Anesthesia Evaluation and Assessment    Patient: Alice Logan MRN: [id number]  SSN: [id number]    YOB: 1955  Age: 65 y.o. Sex: female       Cardiovascular Function/Vital Signs  Visit Vitals    /61    Pulse 76    Temp 36.8 °C (98.2 °F)    Resp 14    Ht 5' 8\" (1.727 m)    Wt 83.5 kg (184 lb)    SpO2 100%    BMI 27.98 kg/m2       Patient is status post MAC anesthesia for Procedure(s):  SIGMOIDOSCOPY FLEXIBLE. Nausea/Vomiting: None    Postoperative hydration reviewed and adequate. Pain:  Pain Scale 1: Numeric (0 - 10) (06/01/18 1313)  Pain Intensity 1: 4 (06/01/18 1313)   Managed    Neurological Status: At baseline    Mental Status and Level of Consciousness: Arousable    Pulmonary Status:   O2 Device: CO2 nasal cannula (06/01/18 1405)   Adequate oxygenation and airway patent    Complications related to anesthesia: None    Post-anesthesia assessment completed.  No concerns    Signed By: Perla Ovalle MD     June 1, 2018

## 2018-06-01 NOTE — H&P
[facility]  [phone]    GASTROENTEROLOGY Pre-Procedure H and P      Impression/Plan:   1. This patient is consented for a sigmoidoscopy for rectal bleeding    Chief Complaint: rectal bleeding      HPI:  Jasmine Loo is a 65 y.o. female who is being is having a colonoscopy for rectal bleeding  PMH:   Past Medical History:   Diagnosis Date    Anemia     Aortic mural thrombus ([facility] [address] 2014    on coumadin followed by dr Anjel Carrington Aortic thrombus [facility]) 13    CAD (coronary artery disease)     Clot     Aortic clot    Clotting disorder (HCC)     Coronary atherosclerosis of native coronary artery     Stable, 18 mos. Since pce, will d/c effient    Diabetes mellitus     Heart abnormalities     Hypercholesterolemia     Hyperlipidemia     Iron deficiency     MI (myocardial infarction) ([facility] [address]     Old myocardial infarction     Post RCA PCI    Other and unspecified hyperlipidemia     On multiple meds, pt had side effect of 28 differnet meds, currently on lipofen and livalo    Postsurgical percutaneous transluminal coronary angioplasty status     stable    Postsurgical percutaneous transluminal coronary angioplasty status     Raynaud's disease     Screening for depression     Splenic infarction 13    Ulcer        PSH:   Past Surgical History:   Procedure Laterality Date    CARDIAC SURG PROCEDURE UNLIST      HX  SECTION       delivery    HX COLONOSCOPY      HX CORONARY STENT PLACEMENT      RCA, VALDEZ    HX GYN      HX HEENT      HX HYSTERECTOMY      abdominal    HX ORTHOPAEDIC      right shoulder surgery       Social HX:   Social History     Social History    Marital status:      Spouse name: N/A    Number of children: N/A    Years of education: N/A     Occupational History    Not on file.      Social History Main Topics    Smoking status: Current Every Day Smoker     Packs/day: 1.50     Years: 40.00    Smokeless tobacco: Never Used      Comment: working on [employer]; chewing gum and using patch    Alcohol use No      Comment: very rare    Drug use: No    Sexual activity: Yes     Partners: Male     Birth control/ protection: None     Other Topics Concern    Not on file     Social History Narrative       FHX:   Family History   Problem Relation Age of Onset    Hypertension Other      essential    Heart Attack Other      myocardial infarction    Other Other      Hypercholesterolemia       Allergy:   Allergies   Allergen Reactions    Latex Other (comments)     Blisters and bleeding from blisters.  Nitroglycerin Other (comments)     Patient states \"that when given medication last time she went into arrest\"    Morphine Shortness of Breath    Sulfa (Sulfonamide Antibiotics) Nausea Only    Tetanus Vaccines And Toxoid Swelling and Other (comments)     fever       Home Medications:     Prescriptions Prior to Admission   Medication Sig    polyethylene glycol (MIRALAX) 17 gram/dose powder Take 17 g by mouth daily. 1 tablespoon with 8 oz of water daily    metoprolol tartrate (LOPRESSOR) 25 mg tablet Take 0.5 Tabs by mouth two (2) times a day.  co-enzyme Q-10 (CO Q-10) 100 mg capsule Take 100 mg by mouth daily.  senna-docusate (PERICOLACE) 8.6-50 mg per tablet Take 1 Tab by mouth daily.  biotin 2,500 mcg tab Take  by mouth.  pitavastatin (LIVALO) 4 mg tab tablet Take 4 mg by mouth daily.  traZODone (DESYREL) 100 mg tablet Take 100 mg by mouth nightly.  aspirin delayed-release 81 mg tablet Take  by mouth daily.  ferrous sulfate (IRON) 325 mg (65 mg iron) tablet Take  by mouth Daily (before breakfast).  magnesium 250 mg Tab Take  by mouth daily.  furosemide (LASIX) 20 mg tablet Take  by mouth daily.  oxyCODONE-acetaminophen (PERCOCET)  mg per tablet Take 1 Tab by mouth as needed.  warfarin (COUMADIN) 7.5 mg tablet Take 7.5 mg by mouth once as needed (7.5 mg x 5 days then 5mg for 1 day then repeat).     metFORMIN (GLUCOPHAGE) 500 mg tablet Take  by mouth two (2) times daily (with meals).  amLODIPine (NORVASC) 2.5 mg tablet Take 2.2 mg by mouth daily.  esomeprazole (NEXIUM) 40 mg capsule Take 40 mg by mouth daily.  DULoxetine (CYMBALTA) 60 mg capsule Take 30 mg by mouth daily.  CALCIUM CARBONATE (CALCIUM 600) 1,500 (600) mg Tab Take 600 mg by mouth once.  cyanocobalamin (VITAMIN B-12) 2,000 mcg TbER Take 2,000 mcg by mouth once.  doxycycline hyclate (DORYX) 75 mg TbEC Take 30 mg by mouth daily.  Fenofibrate (LIPOFEN) 150 mg Cap Take  by mouth daily.  omega-3 fatty acids-vitamin e (FISH OIL) 1,000 mg Cap Take 1 Cap by mouth daily.  cholecalciferol, vitamin d3, (VITAMIN D) 1,000 unit tablet Take  by mouth daily. Review of Systems:     Constitutional: No fevers, chills, weight loss, fatigue. Skin: No rashes, pruritis, jaundice, ulcerations, erythema. HENT: No headaches, nosebleeds, sinus pressure, rhinorrhea, sore throat. Eyes: No visual changes, blurred vision, eye pain, photophobia, jaundice. Cardiovascular: No chest pain, heart palpitations. Respiratory: No cough, SOB, wheezing, chest discomfort, orthopnea. Gastrointestinal:    Genitourinary: No dysuria, bleeding, discharge, pyuria. Musculoskeletal: No weakness, arthralgias, wasting. Endo: No sweats. Heme: No bruising, easy bleeding. Allergies: As noted. Neurological: Cranial nerves intact. Alert and oriented. Gait not assessed. Psychiatric:  No anxiety, depression, hallucinations. Visit Vitals    /77    Pulse 81    Temp 98.2 °F (36.8 °C)    Resp 18    Ht 5' 8\" (1.727 m)    Wt 83.5 kg (184 lb)    SpO2 97%    BMI 27.98 kg/m2       Physical Assessment:     constitutional: appearance: well developed, well nourished, normal habitus, no deformities, in no acute distress. skin: inspection: no rashes, ulcers, icterus or other lesions; no clubbing or telangiectasias.  palpation: no induration or subcutaneos nodules. eyes: inspection: normal conjunctivae and lids; no jaundice pupils: normal  ENMT: mouth: normal oral mucosa,lips and gums; good dentition. oropharynx: normal tongue, hard and soft palate; posterior pharynx without erithema, exudate or lesions. neck: thyroid: normal size, consistency and position; no masses or tenderness. respiratory: effort: normal chest excursion; no intercostal retraction or accessory muscle use. cardiovascular: abdominal aorta: normal size and position; no bruits. palpation: PMI of normal size and position; normal rhythm; no thrill or murmurs. abdominal: abdomen: normal consistency; no tenderness or masses. hernias: no hernias appreciated. liver: normal size and consistency. spleen: not palpable. rectal: hemoccult/guaiac: not performed. musculoskeletal: digits and nails: no clubbing, cyanosis, petechiae or other inflammatory conditions. gait: normal gait and station head and neck: normal range of motion; no pain, crepitation or contracture. spine/ribs/pelvis: normal range of motion; no pain, deformity or contracture. neurologic: cranial nerves: II-XII normal.   psychiatric: judgement/insight: within normal limits. memory: within normal limits for recent and remote events. mood and affect: no evidence of depression, anxiety or agitation. orientation: oriented to time, space and person. Basic Metabolic Profile   No results for input(s): NA, K, CL, CO2, BUN, GLU, CA, MG, PHOS in the last 72 hours. No lab exists for component: CREAT      CBC w/Diff    No results for input(s): WBC, RBC, HGB, HCT, MCV, MCH, MCHC, RDW, PLT, HGBEXT, HCTEXT, PLTEXT in the last 72 hours. No lab exists for component: MPV No results for input(s): GRANS, LYMPH, EOS, PRO, MYELO, METAS, BLAST in the last 72 hours. No lab exists for component: MONO, BASO     Hepatic Function   No results for input(s): ALB, TP, TBILI, GPT, SGOT, AP, AML, LPSE in the last 72 hours.     No lab exists for component: K[place]O [place]   Recent Labs      05/31/18   1202   INR  2.2*           Dae Diaz MD.  [facility], [address] [place] [address]  Cell: [phone]  Direct pager: [phone]  [email or web address]  [email or web address]

## 2018-06-01 NOTE — DISCHARGE INSTRUCTIONS
Sigmoidoscopy: What to Expect at [address] sigmoidoscopy lets your doctor look inside the lower part of your large intestine. This is also called the colon. The doctor uses a lighted tube called a sigmoidoscope (or scope). This test let the doctor look for small growths (called polyps), cancer, bleeding, hemorrhoids, or other problems. The doctor also may have used the scope to remove polyps. Or he or she may have used it to take tissue samples that need to be tested. You shouldn't have any pain after the procedure. But it is normal to pass gas. You may have mild discomfort from having gas. If your doctor removed polyps, you will likely need to schedule a colonoscopy to look at the whole colon. This care sheet gives you a general idea about how long it will take for you to recover. But each person recovers at a different pace. Follow the steps below to get better as quickly as possible. How can you care for yourself at home? Activity  ? · Most people are able to return to work right away unless they have had a sedative during the procedure. ? · You may need someone to drive you home if you have had a sedative. In most cases, you can drive yourself home. Diet  ? · You can eat your normal diet. If your stomach is upset, try bland, low-fat foods like plain rice, broiled chicken, toast, and yogurt. ? · Be sure to drink plenty of liquids to replace those you have lost during the preparation for the procedure. Exercise  ? · You can return to normal exercise right away. ?Medicine  ? · Your doctor will tell you if and when you can restart your medicines. He or she will also give you instructions about taking any new medicines. ? · If you take blood thinners, such as warfarin (Coumadin), clopidogrel (Plavix), or aspirin, be sure to talk to your doctor. He or she will tell you if and when to start taking those medicines again.  Make sure that you understand exactly what your doctor wants you to do.   Follow-up care is a key part of your treatment and safety. Be sure to make and go to all appointments, and call your doctor if you are having problems. It's also a good idea to know your test results and keep a list of the medicines you take. When should you call for help? Call your doctor now or seek immediate medical care if:  ? · You have new or worse belly pain. ? · You have blood in your stools. ? Watch closely for changes in your health, and be sure to contact your doctor if you have any problems. Where can you learn more? Go to http://bc-angelina.info/. Enter R209 in the search box to learn more about \"Sigmoidoscopy: What to Expect at Home. \"  Current as of: May 12, 2017  Content Version: 11.4  © 5552-7625 [employer]. Care instructions adapted under license by [employer] (which disclaims liability or warranty for this information). If you have questions about a medical condition or this instruction, always ask your healthcare professional. [place] [address] any warranty or liability for your use of this information. DISCHARGE SUMMARY from Nurse     POST-PROCEDURE INSTRUCTIONS:    Call your Physician if you:  ? Observe any excess bleeding. ? Develop a temperature over 100.5o F.  ? Experience abdominal, shoulder or chest pain. ? Notice any signs of decreased circulation or nerve impairment to an extremity such as a change in color, persistent numbness, tingling, coldness or increase in pain. ? Vomit blood or you have nausea and vomiting lasting longer than 4 hours. ? Are unable to take medications. ? Are unable to urinate within 8 hours after discharge following general anesthesia or intravenous sedation.     For the next 24 hours after receiving general anesthesia or intravenous sedation, or while taking prescription Narcotics, limit your activities:  ? Do NOT drive a motor vehicle, operate hazard machinery or power tools, or perform tasks that require coordination. The medication you received during your procedure may have some effect on your mental awareness. ? Do NOT make important personal or business decisions. The medication you received during your procedure may have some effect on your mental awareness. ? Do NOT drink alcoholic beverages. These drinks do not mix well with the medications that have been given to you. ? Upon discharge from the hospital, you must be accompanied by a responsible adult. ? Resume your diet as directed by your physician. ? Resume medications as your physician has prescribed. ? Please give a list of your current medications to your Primary Care Provider. ? Please update this list whenever your medications are discontinued, doses are changed, or new medications (including over-the-counter products) are added. ? Please carry medication information at all times in case of emergency situations. These are general instructions for a healthy lifestyle:    No smoking/ No tobacco products/ Avoid exposure to second hand smoke.  Surgeon General's Warning:  Quitting smoking now greatly reduces serious risk to your health. Obesity, smoking, and a sedentary lifestyle greatly increase your risk for illness.  A healthy diet, regular physical exercise & weight monitoring are important for maintaining a healthy lifestyle   You may be retaining fluid if you have a history of heart failure or if you experience any of the following symptoms:  Weight gain of 3 pounds or more overnight or 5 pounds in a week, increased swelling in our hands or feet or shortness of breath while lying flat in bed. Please call your doctor as soon as you notice any of these symptoms; do not wait until your next office visit.     Recognize signs and symptoms of STROKE:  F  -  Face looks uneven  A  -  Arms unable to move or move unevenly  S  -  Speech slurred or non-existent  T  -  Time to call 911 - as soon as signs and symptoms begin - DO NOT go back to bed or wait to see If you get better - TIME IS BRAIN. Colorectal Screening   Colorectal cancer almost always develops from precancerous polyps (abnormal growths) in the colon or rectum. Screening tests can find precancerous polyps, so that they can be removed before they turn into cancer. Screening tests can also find colorectal cancer early, when treatment works best.  [facility] Speak with your physician about when you should begin screening and how often you should be tested. [employer] Activation    Thank you for requesting access to [facility]. Please follow the instructions below to securely access and download your online medical record. [facility] allows you to send messages to your doctor, view your test results, renew your prescriptions, schedule appointments, and more. How Do I Sign Up? 1. In your internet browser, go to [email or web address]. 2. Click on the First Time User? Click Here link in the Sign In box. You will see the New Member Sign Up page. 3. Enter your [employer] [facility] Access Code exactly as it appears below. You will not need to use this code after youve completed the sign-up process. If you do not sign up before the expiration date, you must request a new code. [employer] [facility] Access Code: [email or web address]  Expires: 2018 10:28 AM (This is the date your [employer] [facility] access code will )    4. Enter the last four digits of your Social Security Number (xxxx) and Date of Birth (mm/dd/yyyy) as indicated and click Submit. You will be taken to the next sign-up page. 5. Create a [employer] [facility] ID. This will be your [employer] [facility] login ID and cannot be changed, so think of one that is secure and easy to remember. 6. Create a [employer] [facility] password. You can change your password at any time. 7. Enter your Password Reset Question and Answer. This can be used at a later time if you forget your password. 8. Enter your e-mail address.  You will receive e-mail notification when new information is available in [address] 9. Click Sign Up. You can now view and download portions of your medical record. 10. Click the Download Summary menu link to download a portable copy of your medical information. Additional Information    If you have questions, please call [phone]. Remember, [employer] is NOT to be used for urgent needs. For medical emergencies, dial 911. Educational references and/or instructions provided during this visit included:    Diverticulosis      APPOINTMENTS:    Please make a follow-up appointment with your physician. Discharge information has been reviewed with the patient and responsible party. The patient and responsible party verbalized understanding.

## 2018-06-01 NOTE — PROGRESS NOTES
Ok to resume coumadin per usual dosage      Crys Kraft MD  [facility].  [email or web address]  Phone: [phone]  Pager: [phone]  Cell: [phone]. [email or web address]

## 2018-06-25 ENCOUNTER — OFFICE VISIT (OUTPATIENT)
Dept: CARDIOLOGY CLINIC | Age: 63
End: 2018-06-25

## 2018-06-25 VITALS
DIASTOLIC BLOOD PRESSURE: 70 MMHG | SYSTOLIC BLOOD PRESSURE: 127 MMHG | HEART RATE: 86 BPM | HEIGHT: 68 IN | BODY MASS INDEX: 28.49 KG/M2 | WEIGHT: 188 LBS

## 2018-06-25 DIAGNOSIS — E78.5 HYPERLIPIDEMIA, UNSPECIFIED HYPERLIPIDEMIA TYPE: ICD-10-CM

## 2018-06-25 DIAGNOSIS — I25.10 ATHEROSCLEROSIS OF NATIVE CORONARY ARTERY OF NATIVE HEART WITHOUT ANGINA PECTORIS: Primary | ICD-10-CM

## 2018-06-25 DIAGNOSIS — D68.59 HYPERCOAGULABLE STATE (HCC): ICD-10-CM

## 2018-06-25 DIAGNOSIS — K92.2 GASTROINTESTINAL HEMORRHAGE, UNSPECIFIED GASTROINTESTINAL HEMORRHAGE TYPE: ICD-10-CM

## 2018-06-25 DIAGNOSIS — I25.2 OLD MYOCARDIAL INFARCTION: ICD-10-CM

## 2018-06-25 DIAGNOSIS — Z98.61 POSTSURGICAL PERCUTANEOUS TRANSLUMINAL CORONARY ANGIOPLASTY STATUS: ICD-10-CM

## 2018-06-25 RX ORDER — PREDNISONE 10 MG/1
TABLET ORAL
COMMUNITY
End: 2018-10-03

## 2018-06-25 RX ORDER — TORSEMIDE 20 MG/1
TABLET ORAL DAILY
COMMUNITY

## 2018-06-25 RX ORDER — PIOGLITAZONEHYDROCHLORIDE 30 MG/1
TABLET ORAL DAILY
COMMUNITY

## 2018-06-25 RX ORDER — GEMFIBROZIL 600 MG/1
600 TABLET, FILM COATED ORAL 2 TIMES DAILY
COMMUNITY
End: 2021-08-23

## 2018-06-25 RX ORDER — GLIMEPIRIDE 2 MG/1
TABLET ORAL
COMMUNITY

## 2018-06-25 NOTE — MR AVS SNAPSHOT
Nicolas Shepherd 
 
 
 [address], [address] [facility] [address] 
[phone] Patient: Neto Evans MRN: [id number] REV:[id number]/[id number] Visit Information Date & Time Provider Department Dept. Phone Encounter #  
 6/25/2018  9:30 AM Zhanna Chiu MD [facility] [address] [place] [address] [id number] Follow-up Instructions Return for F/u after tests. Your Appointments 8/22/2018  8:00 AM  
PROCEDURE with [place] NUC [facility] ([facility] Appt Note: with echo price wt [address], [address] [facility] [address]  
[address], [address] [address]  
  
    
 8/22/2018  8:30 AM  
PROCEDURE with [place] ECHO [facility] ([facility]) Appt Note: price with nuc [address], [address] [facility] [address]  
[address], [address] [address]  
  
    
 8/28/2018  9:45 AM  
Office Visit with Caitlin Narvaez MD  
[facility]) Appt Note: post nuc/echo [address], [address] [facility] [address]  
[address], [address] [address] Upcoming Health Maintenance Date Due Pneumococcal 19-64 Medium Risk (1 of 1 - PPSV23) 11/15/1975 DTaP/Tdap/Td series (1 - Tdap) 11/15/1977 PAP AKA CERVICAL CYTOLOGY 11/15/1977 BREAST CANCER SCRN MAMMOGRAM 11/15/2006 FOBT Q 1 YEAR AGE 50-75 11/15/2006 ZOSTER VACCINE AGE 60> 9/15/2016 MEDICARE YEARLY EXAM 3/20/2018 Influenza Age 5 to Adult 8/1/2018 Allergies as of 6/25/2018  Review Complete On: 6/25/2018 By: Zhanna Chiu MD  
  
 Severity Noted Reaction Type Reactions Latex High 03/29/2013   Systemic Other (comments) Blisters and bleeding from blisters. Nitroglycerin High 03/29/2013   Side Effect Other (comments) Patient states \"that when given medication last time she went into arrest\" Morphine   Side Effect Shortness of Breath Sulfa (Sulfonamide Antibiotics)   Side Effect Nausea Only Tetanus Vaccines And Toxoid   Side Effect Swelling, Other (comments) fever Current Immunizations  Never Reviewed No immunizations on file. Not reviewed this visit You Were Diagnosed With   
  
 Codes Comments Atherosclerosis of native coronary artery of native heart without angina pectoris    -  Primary ICD-10-CM: I25.10 ICD-9-CM: 414.01 Stable no angina Old myocardial infarction     ICD-10-CM: I25.2 ICD-9-CM: 412 Stable Postsurgical percutaneous transluminal coronary angioplasty status     ICD-10-CM: Z98.61 ICD-9-CM: V45.82 Hyperlipidemia, unspecified hyperlipidemia type     ICD-10-CM: E78.5 ICD-9-CM: 272.4 On statin lab with PCP Gastrointestinal hemorrhage, unspecified gastrointestinal hemorrhage type     ICD-10-CM: K92.2 ICD-9-CM: 578.9 No clear etiology of GI bleed. Recent workup noted Hypercoagulable state ([address]     ICD-10-CM: D68.59 
ICD-9-CM: 289.81 Maintain on Coumadin. INR followed by PCP. Vitals BP Pulse Height(growth percentile) Weight(growth percentile) BMI OB Status 127/70 86 5' 8\" (1.727 m) 188 lb (85.3 kg) 28.59 kg/m2 Hysterectomy Smoking Status Current Every Day Smoker Vitals History BMI and BSA Data Body Mass Index Body Surface Area 28.59 kg/m 2 2.02 m 2 Preferred Pharmacy Pharmacy Name Phone [address] [place] [address], [facility] [address] [phone] Your Updated Medication List  
  
   
This list is accurate as of 6/25/18 10:06 AM.  Always use your most recent med list. amLODIPine 2.5 mg tablet Commonly known as:  Montalba Paty Take 2.5 mg by mouth daily. aspirin delayed-release 81 mg tablet Take  by mouth daily. biotin 2,500 mcg Tab Take  by mouth. CALCIUM 600 600 mg calcium (1,500 mg) tablet Take 600 mg by mouth once. CO Q-10 100 mg capsule Generic drug:  co-enzyme Q-10 Take 100 mg by mouth daily. COUMADIN 7.5 mg tablet Generic drug:  warfarin Take 7.5 mg by mouth once as needed (7.5 mg x 5 days then 5mg for 1 day then repeat). CYMBALTA 60 mg capsule Generic drug:  DULoxetine Take 30 mg by mouth daily. DORYX 75 mg Tbec Generic drug:  doxycycline hyclate Take 100 mg by mouth daily. FETZIMA 20 mg capsule Generic drug:  levomilnacipran Take 20 mg by mouth daily. FISH OIL 1,000 mg Cap Generic drug:  omega-3 fatty acids-vitamin e Take 1 Cap by mouth daily. furosemide 20 mg tablet Commonly known as:  LASIX Take 20 mg by mouth daily. gemfibrozil 600 mg tablet Commonly known as:  LOPID Take 600 mg by mouth two (2) times a day. glimepiride 2 mg tablet Commonly known as:  AMARYL Take  by mouth every morning. Iron 325 mg (65 mg iron) tablet Generic drug:  ferrous sulfate Take  by mouth Daily (before breakfast). LIVALO 4 mg Tab tablet Generic drug:  pitavastatin calcium Take 4 mg by mouth daily. magnesium 250 mg Tab Take  by mouth daily. metFORMIN 500 mg tablet Commonly known as:  GLUCOPHAGE Take  by mouth two (2) times daily (with meals). metoprolol tartrate 25 mg tablet Commonly known as:  LOPRESSOR Take 0.5 Tabs by mouth two (2) times a day. NexIUM 40 mg capsule Generic drug:  esomeprazole Take 40 mg by mouth daily. PERCOCET  mg per tablet Generic drug:  oxyCODONE-acetaminophen Take 1 Tab by mouth as needed. pioglitazone 30 mg tablet Commonly known as:  ACTOS Take  by mouth daily. polyethylene glycol 17 gram/dose powder Commonly known as:  Lawerance Amas Take 17 g by mouth daily. 1 tablespoon with 8 oz of water daily  
  
 predniSONE 10 mg tablet Commonly known as:  Hattie Look Take  by mouth daily (with breakfast). senna-docusate 8.6-50 mg per tablet Commonly known as:  Demaris Lauro Take 1 Tab by mouth daily. torsemide 20 mg tablet Commonly known as:  DEMADEX Take  by mouth daily. traZODone 100 mg tablet Commonly known as:  Margarita Dianna Take 100 mg by mouth nightly. VITAMIN B-12 2,000 mcg Tber Generic drug:  cyanocobalamin Take 2,000 mcg by mouth once. VITAMIN D3 1,000 unit tablet Generic drug:  cholecalciferol Take  by mouth daily. Follow-up Instructions Return for F/u after tests. To-Do List   
 06/25/2018 Echocardiography:  ECHO ADULT COMPLETE   
  
 06/25/2018 NM Stress:  NUCLEAR CARDIAC STRESS TEST Introducing [facility]! Yo Craven introduces [employer] patient portal. Now you can access parts of your medical record, email your doctor's office, and request medication refills online. 1. In your internet browser, go to [email or web address] 2. Click on the First Time User? Click Here link in the Sign In box. You will see the New Member Sign Up page. 3. Enter your [employer] Access Code exactly as it appears below. You will not need to use this code after youve completed the sign-up process. If you do not sign up before the expiration date, you must request a new code. · [employer] Access Code: [email or web address] Expires: 8/29/2018 10:28 AM 
 
4. Enter the last four digits of your Social Security Number (xxxx) and Date of Birth (mm/dd/yyyy) as indicated and click Submit. You will be taken to the next sign-up page. 5. Create a [employer] ID. This will be your [employer] login ID and cannot be changed, so think of one that is secure and easy to remember. 6. Create a [employer] password. You can change your password at any time. 7. Enter your Password Reset Question and Answer. This can be used at a later time if you forget your password. 8. Enter your e-mail address. You will receive e-mail notification when new information is available in [address] 9. Click Sign Up. You can now view and download portions of your medical record. 10. Click the Download Summary menu link to download a portable copy of your medical information. If you have questions, please visit the Frequently Asked Questions section of the [employer] website. Remember, [employer] is NOT to be used for urgent needs. For medical emergencies, dial 911. Now available from your iPhone and Android! Please provide this summary of care documentation to your next provider. Your primary care clinician is listed as Dipika Sharp. If you have any questions after today's visit, please call [phone].

## 2018-06-25 NOTE — LETTER
Kavita Aw 1955 
 
6/25/2018 Dear Rochelle Easton MD 
 
I had the pleasure of evaluating  Ms. Nils Marsh in office today. Below are the relevant portions of my assessment and plan of care. ICD-10-CM ICD-9-CM 1. Atherosclerosis of native coronary artery of native heart without angina pectoris I25.10 414.01 ECHO ADULT COMPLETE  
   NUCLEAR CARDIAC STRESS TEST Stable no angina 2. Old myocardial infarction I25.2 412 ECHO ADULT COMPLETE  
   NUCLEAR CARDIAC STRESS TEST Stable 3. Postsurgical percutaneous transluminal coronary angioplasty status Z98.61 V45.82 ECHO ADULT COMPLETE  
   NUCLEAR CARDIAC STRESS TEST 4. Hyperlipidemia, unspecified hyperlipidemia type E78.5 272.4 On statin lab with PCP  
5. Gastrointestinal hemorrhage, unspecified gastrointestinal hemorrhage type K92.2 578.9 No clear etiology of GI bleed. Recent workup noted 6. Hypercoagulable state ([facility] [address] D68.59 289.81 Maintain on Coumadin. INR followed by PCP. Current Outpatient Prescriptions Medication Sig Dispense Refill  gemfibrozil (LOPID) 600 mg tablet Take 600 mg by mouth two (2) times a day.  glimepiride (AMARYL) 2 mg tablet Take  by mouth every morning.  torsemide (DEMADEX) 20 mg tablet Take  by mouth daily.  pioglitazone (ACTOS) 30 mg tablet Take  by mouth daily.  levomilnacipran (FETZIMA) 20 mg capsule Take 20 mg by mouth daily.  predniSONE (DELTASONE) 10 mg tablet Take  by mouth daily (with breakfast).  polyethylene glycol (MIRALAX) 17 gram/dose powder Take 17 g by mouth daily. 1 tablespoon with 8 oz of water daily 510 g 0  
 metoprolol tartrate (LOPRESSOR) 25 mg tablet Take 0.5 Tabs by mouth two (2) times a day. 90 Tab 3  
 co-enzyme Q-10 (CO Q-10) 100 mg capsule Take 100 mg by mouth daily.  senna-docusate (PERICOLACE) 8.6-50 mg per tablet Take 1 Tab by mouth daily.  biotin 2,500 mcg tab Take  by mouth.  pitavastatin (LIVALO) 4 mg tab tablet Take 4 mg by mouth daily.  traZODone (DESYREL) 100 mg tablet Take 100 mg by mouth nightly.  aspirin delayed-release 81 mg tablet Take  by mouth daily.  ferrous sulfate (IRON) 325 mg (65 mg iron) tablet Take  by mouth Daily (before breakfast).  magnesium 250 mg Tab Take  by mouth daily.  furosemide (LASIX) 20 mg tablet Take 20 mg by mouth daily.  oxyCODONE-acetaminophen (PERCOCET)  mg per tablet Take 1 Tab by mouth as needed.  warfarin (COUMADIN) 7.5 mg tablet Take 7.5 mg by mouth once as needed (7.5 mg x 5 days then 5mg for 1 day then repeat).  metFORMIN (GLUCOPHAGE) 500 mg tablet Take  by mouth two (2) times daily (with meals).  amLODIPine (NORVASC) 2.5 mg tablet Take 2.5 mg by mouth daily.  esomeprazole (NEXIUM) 40 mg capsule Take 40 mg by mouth daily.  DULoxetine (CYMBALTA) 60 mg capsule Take 30 mg by mouth daily.  CALCIUM CARBONATE (CALCIUM 600) 1,500 (600) mg Tab Take 600 mg by mouth once.  cyanocobalamin (VITAMIN B-12) 2,000 mcg TbER Take 2,000 mcg by mouth once.  doxycycline hyclate (DORYX) 75 mg TbEC Take 100 mg by mouth daily.  omega-3 fatty acids-vitamin e (FISH OIL) 1,000 mg Cap Take 1 Cap by mouth daily.  cholecalciferol, vitamin d3, (VITAMIN D) 1,000 unit tablet Take  by mouth daily. Orders Placed This Encounter  gemfibrozil (LOPID) 600 mg tablet Sig: Take 600 mg by mouth two (2) times a day.  glimepiride (AMARYL) 2 mg tablet Sig: Take  by mouth every morning.  torsemide (DEMADEX) 20 mg tablet Sig: Take  by mouth daily.  pioglitazone (ACTOS) 30 mg tablet Sig: Take  by mouth daily.  levomilnacipran (FETZIMA) 20 mg capsule Sig: Take 20 mg by mouth daily.  predniSONE (DELTASONE) 10 mg tablet Sig: Take  by mouth daily (with breakfast). If you have questions, please do not hesitate to call me.   I look forward to following Ms. Bassett along with you. Sincerely, Sebastián Bryant MD

## 2018-06-25 NOTE — PROGRESS NOTES
HISTORY OF PRESENT ILLNESS  Darlene Garcia is a 65 y.o. female. HPI Comments: Patient with cad,old mi,hypercoaguble state,old pci. On follow up patient denies any chest pains,sob, palpitation or other significant symptoms. Hospital Follow Up   Pertinent negatives include no chest pain, no abdominal pain, no headaches and no shortness of breath. Cholesterol Problem   The history is provided by the patient. This is a chronic problem. The problem occurs constantly. Pertinent negatives include no chest pain, no abdominal pain, no headaches and no shortness of breath. Review of Systems   Constitutional: Negative for chills and fever. HENT: Negative for nosebleeds. Eyes: Negative for blurred vision and double vision. Respiratory: Negative for cough, hemoptysis, sputum production, shortness of breath and wheezing. Cardiovascular: Negative for chest pain, palpitations, orthopnea, claudication, leg swelling and PND. Gastrointestinal: Negative for abdominal pain, heartburn, nausea and vomiting. Musculoskeletal: Negative for myalgias. Skin: Negative for rash. Neurological: Negative for dizziness, weakness and headaches. Endo/Heme/Allergies: Does not bruise/bleed easily. Family History   Problem Relation Age of Onset    Hypertension Other      essential    Heart Attack Other      myocardial infarction    Other Other      Hypercholesterolemia       Past Medical History:   Diagnosis Date    Anemia     Aortic mural thrombus ([facility] [address] 11/24/2014    on coumadin followed by dr Aleks Hannon Aortic thrombus [facility]) 03/29/13    CAD (coronary artery disease)     Clot     Aortic clot    Clotting disorder ([facility] [address]     Coronary atherosclerosis of native coronary artery     Stable, 18 mos. Since pce, will d/c effient    Diabetes mellitus     Gastrointestinal hemorrhage 6/25/2018    No clear etiology of GI bleed.   Recent workup noted    Heart abnormalities     Hypercholesterolemia     Hypercoagulable state ([facility] [address] 2018    Maintain on Coumadin. INR followed by PCP.  Hyperlipidemia     Iron deficiency     MI (myocardial infarction) ([facility] [address]     Old myocardial infarction     Post RCA PCI    Other and unspecified hyperlipidemia     On multiple meds, pt had side effect of 28 differnet meds, currently on lipofen and livalo    Postsurgical percutaneous transluminal coronary angioplasty status     stable    Postsurgical percutaneous transluminal coronary angioplasty status     Raynaud's disease     Screening for depression     Splenic infarction 13    Ulcer        Past Surgical History:   Procedure Laterality Date    CARDIAC SURG PROCEDURE UNLIST      FLEXIBLE SIGMOIDOSCOPY N/A 2018    SIGMOIDOSCOPY FLEXIBLE performed by Ashley Cano MD at [address] [facility]  [facility]       delivery    HX COLONOSCOPY      HX CORONARY STENT PLACEMENT      RCA, VALDEZ    HX GYN      HX HEENT      HX HYSTERECTOMY      abdominal    HX ORTHOPAEDIC      right shoulder surgery       Social History   Substance Use Topics    Smoking status: Current Every Day Smoker     Packs/day: 2.50     Years: 40.00    Smokeless tobacco: Never Used      Comment: working on [employer]; chewing gum and using patch    Alcohol use No      Comment: very rare       Allergies   Allergen Reactions    Latex Other (comments)     Blisters and bleeding from blisters.  Nitroglycerin Other (comments)     Patient states \"that when given medication last time she went into arrest\"    Morphine Shortness of Breath    Sulfa (Sulfonamide Antibiotics) Nausea Only    Tetanus Vaccines And Toxoid Swelling and Other (comments)     fever       Current Outpatient Prescriptions   Medication Sig    gemfibrozil (LOPID) 600 mg tablet Take 600 mg by mouth two (2) times a day.  glimepiride (AMARYL) 2 mg tablet Take  by mouth every morning.  torsemide (DEMADEX) 20 mg tablet Take  by mouth daily.     pioglitazone (ACTOS) 30 mg tablet Take  by mouth daily.  levomilnacipran (FETZIMA) 20 mg capsule Take 20 mg by mouth daily.  predniSONE (DELTASONE) 10 mg tablet Take  by mouth daily (with breakfast).  polyethylene glycol (MIRALAX) 17 gram/dose powder Take 17 g by mouth daily. 1 tablespoon with 8 oz of water daily    metoprolol tartrate (LOPRESSOR) 25 mg tablet Take 0.5 Tabs by mouth two (2) times a day.  co-enzyme Q-10 (CO Q-10) 100 mg capsule Take 100 mg by mouth daily.  senna-docusate (PERICOLACE) 8.6-50 mg per tablet Take 1 Tab by mouth daily.  biotin 2,500 mcg tab Take  by mouth.  pitavastatin (LIVALO) 4 mg tab tablet Take 4 mg by mouth daily.  traZODone (DESYREL) 100 mg tablet Take 100 mg by mouth nightly.  aspirin delayed-release 81 mg tablet Take  by mouth daily.  ferrous sulfate (IRON) 325 mg (65 mg iron) tablet Take  by mouth Daily (before breakfast).  magnesium 250 mg Tab Take  by mouth daily.  furosemide (LASIX) 20 mg tablet Take 20 mg by mouth daily.  oxyCODONE-acetaminophen (PERCOCET)  mg per tablet Take 1 Tab by mouth as needed.  warfarin (COUMADIN) 7.5 mg tablet Take 7.5 mg by mouth once as needed (7.5 mg x 5 days then 5mg for 1 day then repeat).  metFORMIN (GLUCOPHAGE) 500 mg tablet Take  by mouth two (2) times daily (with meals).  amLODIPine (NORVASC) 2.5 mg tablet Take 2.5 mg by mouth daily.  esomeprazole (NEXIUM) 40 mg capsule Take 40 mg by mouth daily.  DULoxetine (CYMBALTA) 60 mg capsule Take 30 mg by mouth daily.  CALCIUM CARBONATE (CALCIUM 600) 1,500 (600) mg Tab Take 600 mg by mouth once.  cyanocobalamin (VITAMIN B-12) 2,000 mcg TbER Take 2,000 mcg by mouth once.  doxycycline hyclate (DORYX) 75 mg TbEC Take 100 mg by mouth daily.  omega-3 fatty acids-vitamin e (FISH OIL) 1,000 mg Cap Take 1 Cap by mouth daily.  cholecalciferol, vitamin d3, (VITAMIN D) 1,000 unit tablet Take  by mouth daily.      No current facility-administered medications for this visit. Visit Vitals    /70    Pulse 86    Ht 5' 8\" (1.727 m)    Wt 85.3 kg (188 lb)    BMI 28.59 kg/m2         Physical Exam   Constitutional: She is oriented to person, place, and time. She appears well-developed and well-nourished. HENT:   Head: Normocephalic and atraumatic. Eyes: Conjunctivae are normal.   Neck: Neck supple. No JVD present. No tracheal deviation present. No thyromegaly present. Cardiovascular: Normal rate and regular rhythm. PMI is not displaced. Exam reveals no gallop and no decreased pulses. Murmur heard. Early systolic murmur is present  at the upper right sternal border  Pulmonary/Chest: No respiratory distress. She has no wheezes. She has no rales. She exhibits no tenderness. Abdominal: Soft. There is no tenderness. Musculoskeletal: She exhibits no edema. Neurological: She is alert and oriented to person, place, and time. Skin: Skin is warm. Psychiatric: She has a normal mood and affect. Ms. Karley Torres has a reminder for a \"due or due soon\" health maintenance. I have asked that she contact her primary care provider for follow-up on this health maintenance. CARDIOLOGY STUDIES 12/1/2011 5/1/2011 4/1/2011   Myocardial Perfusion Scan Result normal scan - -   Stress Echo Result - see notes -   Exercise Nuclear Stress Test Result - negative with good exercise stress test -   Echocardiogram - Complete Result - - EF 55-60%   Coronary Angiogram Result - - 95% RCA, PCI for stemi, single vessel disease   Some recent data might be hidden     Impression:cta-2014    Small amount of residual thrombus remains in the left lateral wall of the distal thoracic aorta.  The large luminal filling defect is no longer evident.  No new filling defects. Ulcerated plaque at the abdominal aorta at the level of the JOLLY posteriorly also stable in appearance. Mesenteric narrowings as above similar to prior exam.    Resolving splenic infarct.     No recurrence of pancreatitis. Hepatic steatosis. Splenic and renal infarcts as seen previously. Assessment       ICD-10-CM ICD-9-CM    1. Atherosclerosis of native coronary artery of native heart without angina pectoris I25.10 414.01 ECHO ADULT COMPLETE      NUCLEAR CARDIAC STRESS TEST    Stable no angina   2. Old myocardial infarction I25.2 412 ECHO ADULT COMPLETE      NUCLEAR CARDIAC STRESS TEST    Stable   3. Postsurgical percutaneous transluminal coronary angioplasty status Z98.61 V45.82 ECHO ADULT COMPLETE      NUCLEAR CARDIAC STRESS TEST   4. Hyperlipidemia, unspecified hyperlipidemia type E78.5 272.4     On statin lab with PCP   5. Gastrointestinal hemorrhage, unspecified gastrointestinal hemorrhage type K92.2 578.9     No clear etiology of GI bleed. Recent workup noted   6. Hypercoagulable state ([facility] [address] D68.59 289.81     Maintain on Coumadin. INR followed by PCP. Medications Discontinued During This Encounter   Medication Reason    Fenofibrate (LIPOFEN) 150 mg Cap Not A Current Medication       No orders of the defined types were placed in this encounter. Follow-up Disposition:  Return for F/u after tests.

## 2018-10-03 ENCOUNTER — OFFICE VISIT (OUTPATIENT)
Dept: CARDIOLOGY CLINIC | Age: 63
End: 2018-10-03

## 2018-10-03 VITALS
DIASTOLIC BLOOD PRESSURE: 61 MMHG | HEART RATE: 80 BPM | SYSTOLIC BLOOD PRESSURE: 104 MMHG | BODY MASS INDEX: 28.49 KG/M2 | WEIGHT: 188 LBS | HEIGHT: 68 IN

## 2018-10-03 DIAGNOSIS — Z98.61 POSTSURGICAL PERCUTANEOUS TRANSLUMINAL CORONARY ANGIOPLASTY STATUS: ICD-10-CM

## 2018-10-03 DIAGNOSIS — I25.10 ATHEROSCLEROSIS OF NATIVE CORONARY ARTERY OF NATIVE HEART WITHOUT ANGINA PECTORIS: Primary | ICD-10-CM

## 2018-10-03 DIAGNOSIS — E78.5 HYPERLIPIDEMIA, UNSPECIFIED HYPERLIPIDEMIA TYPE: ICD-10-CM

## 2018-10-03 DIAGNOSIS — I25.2 OLD MYOCARDIAL INFARCTION: ICD-10-CM

## 2018-10-03 NOTE — LETTER
[place]anes [place] 1955 
 
10/3/2018 Dear Fina Starr MD 
 
I had the pleasure of evaluating  Ms. Sukhjinder Whitaker in office today. Below are the relevant portions of my assessment and plan of care. ICD-10-CM ICD-9-CM 1. Atherosclerosis of native coronary artery of native heart without angina pectoris I25.10 414.01   
 stable 
negative stress test 
monitor 2. Old myocardial infarction I25.2 412   
 stable 3. Hyperlipidemia, unspecified hyperlipidemia type E78.5 272.4   
 continue statin 4. Postsurgical percutaneous transluminal coronary angioplasty status Z98.61 V45.82   
 stable Current Outpatient Prescriptions Medication Sig Dispense Refill  calcium polycarbophil (FIBERCON PO) Take  by mouth.  gemfibrozil (LOPID) 600 mg tablet Take 600 mg by mouth two (2) times a day.  glimepiride (AMARYL) 2 mg tablet Take  by mouth every morning.  torsemide (DEMADEX) 20 mg tablet Take  by mouth daily.  pioglitazone (ACTOS) 30 mg tablet Take  by mouth daily.  levomilnacipran (FETZIMA) 20 mg capsule Take 20 mg by mouth daily.  metoprolol tartrate (LOPRESSOR) 25 mg tablet Take 0.5 Tabs by mouth two (2) times a day. 90 Tab 3  
 co-enzyme Q-10 (CO Q-10) 100 mg capsule Take 100 mg by mouth daily.  senna-docusate (PERICOLACE) 8.6-50 mg per tablet Take 1 Tab by mouth daily.  biotin 2,500 mcg tab Take  by mouth.  pitavastatin (LIVALO) 4 mg tab tablet Take 4 mg by mouth daily.  traZODone (DESYREL) 100 mg tablet Take 100 mg by mouth nightly.  aspirin delayed-release 81 mg tablet Take  by mouth daily.  ferrous sulfate (IRON) 325 mg (65 mg iron) tablet Take  by mouth Daily (before breakfast).  magnesium 250 mg Tab Take  by mouth daily.  oxyCODONE-acetaminophen (PERCOCET)  mg per tablet Take 1 Tab by mouth as needed.     
 warfarin (COUMADIN) 7.5 mg tablet Take 7.5 mg by mouth once as needed (7.5 mg x 5 days then 5mg for 1 day then repeat).  metFORMIN (GLUCOPHAGE) 500 mg tablet Take  by mouth two (2) times daily (with meals).  amLODIPine (NORVASC) 2.5 mg tablet Take 2.5 mg by mouth daily.  esomeprazole (NEXIUM) 40 mg capsule Take 40 mg by mouth daily.  DULoxetine (CYMBALTA) 60 mg capsule Take 30 mg by mouth daily.  CALCIUM CARBONATE (CALCIUM 600) 1,500 (600) mg Tab Take 500 mg by mouth once.  cyanocobalamin (VITAMIN B-12) 2,000 mcg TbER Take 2,000 mcg by mouth once.  doxycycline hyclate (DORYX) 75 mg TbEC Take 100 mg by mouth daily.  omega-3 fatty acids-vitamin e (FISH OIL) 1,000 mg Cap Take 1 Cap by mouth daily.  cholecalciferol, vitamin d3, (VITAMIN D) 1,000 unit tablet Take  by mouth daily. Orders Placed This Encounter  DISCONTD: psyllium husk (DAILY FIBER PO) Sig: Take  by mouth.  calcium polycarbophil (FIBERCON PO) Sig: Take  by mouth. If you have questions, please do not hesitate to call me. I look forward to following Ms. Bassett along with you. Sincerely, Delaney Wilkes MD

## 2018-10-03 NOTE — MR AVS SNAPSHOT
[facility] 
 
 
 [address], [address] [facility] [address] [phone] Patient: Clarisse Santos MRN: [id number] Visit Information Date & Time Provider Department Dept. Phone Encounter #  
 10/3/2018  9:00 AM Vaughn Houston MD [facility] [address] [place] [address] [id number] Follow-up Instructions Return in about 6 months (around 4/3/2019). Your Appointments 4/17/2019  9:30 AM  
Office Visit with Vaughn Houston MD  
[facility]) Appt Note: 6 months [address], [address] [facility] [address] [address], [address] [address] Upcoming Health Maintenance Date Due Pneumococcal 19-64 Medium Risk (1 of 1 - PPSV23) 11/15/1975 DTaP/Tdap/Td series (1 - Tdap) 11/15/1977 PAP AKA CERVICAL CYTOLOGY 11/15/1977 Shingrix Vaccine Age 50> (1 of 2) 11/15/2006 BREAST CANCER SCRN MAMMOGRAM 11/15/2006 FOBT Q 1 YEAR AGE 50-75 11/15/2006 MEDICARE YEARLY EXAM 3/20/2018 Influenza Age 5 to Adult 8/1/2018 Allergies as of 10/3/2018  Review Complete On: 10/3/2018 By: Tomasz Rock' Severity Noted Reaction Type Reactions Latex High 03/29/2013   Systemic Other (comments) Blisters and bleeding from blisters. Nitroglycerin High 03/29/2013   Side Effect Other (comments) Patient states \"that when given medication last time she went into arrest\" Morphine   Side Effect Shortness of Breath Sulfa (Sulfonamide Antibiotics)   Side Effect Nausea Only Tetanus Vaccines And Toxoid   Side Effect Swelling, Other (comments) fever Current Immunizations  Never Reviewed No immunizations on file. Not reviewed this visit You Were Diagnosed With   
  
 Codes Comments Atherosclerosis of native coronary artery of native heart without angina pectoris    -  Primary ICD-10-CM: I25.10 ICD-9-CM: 414.01 stable 
negative stress test 
monitor Old myocardial infarction     ICD-10-CM: I25.2 ICD-9-CM: 412 stable Hyperlipidemia, unspecified hyperlipidemia type     ICD-10-CM: E78.5 ICD-9-CM: 272.4 continue statin Postsurgical percutaneous transluminal coronary angioplasty status     ICD-10-CM: Z98.61 ICD-9-CM: V45.82 stable Vitals BP Pulse Height(growth percentile) Weight(growth percentile) BMI OB Status 104/61 80 5' 8\" (1.727 m) 188 lb (85.3 kg) 28.59 kg/m2 Hysterectomy Smoking Status Current Every Day Smoker Vitals History BMI and BSA Data Body Mass Index Body Surface Area 28.59 kg/m 2 2.02 m 2 Preferred Pharmacy Pharmacy Name Phone [address] [place] [address], [address] [place] [address] Road [phone] Your Updated Medication List  
  
   
This list is accurate as of 10/3/18  9:40 AM.  Always use your most recent med list. amLODIPine 2.5 mg tablet Commonly known as:  Maximilian Rout Take 2.5 mg by mouth daily. aspirin delayed-release 81 mg tablet Take  by mouth daily. biotin 2,500 mcg Tab Take  by mouth. CALCIUM 600 600 mg calcium (1,500 mg) tablet Take 500 mg by mouth once. CO Q-10 100 mg capsule Generic drug:  co-enzyme Q-10 Take 100 mg by mouth daily. COUMADIN 7.5 mg tablet Generic drug:  warfarin Take 7.5 mg by mouth once as needed (7.5 mg x 5 days then 5mg for 1 day then repeat). CYMBALTA 60 mg capsule Generic drug:  DULoxetine Take 30 mg by mouth daily. DORYX 75 mg Tbec Generic drug:  doxycycline hyclate Take 100 mg by mouth daily. FETZIMA 20 mg capsule Generic drug:  levomilnacipran Take 20 mg by mouth daily. FIBERCON PO Take  by mouth. FISH OIL 1,000 mg Cap Generic drug:  omega-3 fatty acids-vitamin e Take 1 Cap by mouth daily. gemfibrozil 600 mg tablet Commonly known as:  LOPID  
 Take 600 mg by mouth two (2) times a day. glimepiride 2 mg tablet Commonly known as:  AMARYL Take  by mouth every morning. Iron 325 mg (65 mg iron) tablet Generic drug:  ferrous sulfate Take  by mouth Daily (before breakfast). LIVALO 4 mg Tab tablet Generic drug:  pitavastatin calcium Take 4 mg by mouth daily. magnesium 250 mg Tab Take  by mouth daily. metFORMIN 500 mg tablet Commonly known as:  GLUCOPHAGE Take  by mouth two (2) times daily (with meals). metoprolol tartrate 25 mg tablet Commonly known as:  LOPRESSOR Take 0.5 Tabs by mouth two (2) times a day. NexIUM 40 mg capsule Generic drug:  esomeprazole Take 40 mg by mouth daily. PERCOCET  mg per tablet Generic drug:  oxyCODONE-acetaminophen Take 1 Tab by mouth as needed. pioglitazone 30 mg tablet Commonly known as:  ACTOS Take  by mouth daily. senna-docusate 8.6-50 mg per tablet Commonly known as:  Jonn Erm Take 1 Tab by mouth daily. torsemide 20 mg tablet Commonly known as:  DEMADEX Take  by mouth daily. traZODone 100 mg tablet Commonly known as:  Aiden Cambric Take 100 mg by mouth nightly. VITAMIN B-12 2,000 mcg Tber Generic drug:  cyanocobalamin Take 2,000 mcg by mouth once. VITAMIN D3 1,000 unit tablet Generic drug:  cholecalciferol Take  by mouth daily. Follow-up Instructions Return in about 6 months (around 4/3/2019). Introducing [facility]! [employer] introduces [employer] patient portal. Now you can access parts of your medical record, email your doctor's office, and request medication refills online. 1. In your internet browser, go to [email or web address] 2. Click on the First Time User? Click Here link in the Sign In box. You will see the New Member Sign Up page. 3. Enter your [employer] Access Code exactly as it appears below.  You will not need to use this code after youve completed the sign-up process. If you do not sign up before the expiration date, you must request a new code. · [employer] Access Code: [email or web address] Expires: 1/1/2019  9:12 AM 
 
4. Enter the last four digits of your Social Security Number (xxxx) and Date of Birth (mm/dd/yyyy) as indicated and click Submit. You will be taken to the next sign-up page. 5. Create a [employer] ID. This will be your [employer] login ID and cannot be changed, so think of one that is secure and easy to remember. 6. Create a [employer] password. You can change your password at any time. 7. Enter your Password Reset Question and Answer. This can be used at a later time if you forget your password. 8. Enter your e-mail address. You will receive e-mail notification when new information is available in [address]. 9. Click Sign Up. You can now view and download portions of your medical record. 10. Click the Download Summary menu link to download a portable copy of your medical information. If you have questions, please visit the Frequently Asked Questions section of the [employer] website. Remember, [employer] is NOT to be used for urgent needs. For medical emergencies, dial 911. Now available from your iPhone and Android! Please provide this summary of care documentation to your next provider. Your primary care clinician is listed as Ole Talbert. If you have any questions after today's visit, please call [phone].

## 2018-10-03 NOTE — PROGRESS NOTES
HISTORY OF PRESENT ILLNESS  Tung Santillan is a 65 y.o. female. HPI Comments: Patient with cad,old mi,hypercoaguble state,old pci. On follow up patient denies any chest pains,sob, palpitation or other significant symptoms. Cholesterol Problem   The history is provided by the patient. This is a chronic problem. The problem occurs constantly. Pertinent negatives include no chest pain, no abdominal pain, no headaches and no shortness of breath. Review of Systems   Constitutional: Negative for chills and fever. HENT: Negative for nosebleeds. Eyes: Negative for blurred vision and double vision. Respiratory: Negative for cough, hemoptysis, sputum production, shortness of breath and wheezing. Cardiovascular: Negative for chest pain, palpitations, orthopnea, claudication, leg swelling and PND. Gastrointestinal: Negative for abdominal pain, heartburn, nausea and vomiting. Musculoskeletal: Negative for myalgias. Skin: Negative for rash. Neurological: Negative for dizziness, weakness and headaches. Endo/Heme/Allergies: Does not bruise/bleed easily. Family History   Problem Relation Age of Onset    Hypertension Other      essential    Heart Attack Other      myocardial infarction    Other Other      Hypercholesterolemia       Past Medical History:   Diagnosis Date    Anemia     Aortic mural thrombus ([address] 11/24/2014    on coumadin followed by dr Yoandy Patiño Aortic thrombus [facility]) 03/29/13    CAD (coronary artery disease)     Clot     Aortic clot    Clotting disorder ([address]     Coronary atherosclerosis of native coronary artery     Stable, 18 mos. Since pce, will d/c effient    Diabetes mellitus     Gastrointestinal hemorrhage 6/25/2018    No clear etiology of GI bleed. Recent workup noted    Heart abnormalities     Hypercholesterolemia     Hypercoagulable state ([address] 6/25/2018    Maintain on Coumadin. INR followed by PCP.     Hyperlipidemia     Iron deficiency     [facility] [facility]     Old myocardial infarction     Post RCA PCI    Other and unspecified hyperlipidemia     On multiple meds, pt had side effect of 28 differnet meds, currently on lipofen and livalo    Postsurgical percutaneous transluminal coronary angioplasty status     stable    Postsurgical percutaneous transluminal coronary angioplasty status     Raynaud's disease     Screening for depression     Splenic infarction 13    Ulcer        Past Surgical History:   Procedure Laterality Date    CARDIAC SURG PROCEDURE UNLIST      FLEXIBLE SIGMOIDOSCOPY N/A 2018    SIGMOIDOSCOPY FLEXIBLE performed by Neva Lewis MD at [address]  [facility]       delivery    HX COLONOSCOPY      HX CORONARY STENT PLACEMENT      RCA, VALDEZ    HX GYN      HX HEENT      HX HYSTERECTOMY      abdominal    HX ORTHOPAEDIC      right shoulder surgery       Social History   Substance Use Topics    Smoking status: Current Every Day Smoker     Packs/day: 2.50     Years: 40.00    Smokeless tobacco: Never Used      Comment: working on [employer]; chewing gum and using patch    Alcohol use No      Comment: very rare       Allergies   Allergen Reactions    Latex Other (comments)     Blisters and bleeding from blisters.  Nitroglycerin Other (comments)     Patient states \"that when given medication last time she went into arrest\"    Morphine Shortness of Breath    Sulfa (Sulfonamide Antibiotics) Nausea Only    Tetanus Vaccines And Toxoid Swelling and Other (comments)     fever       Current Outpatient Prescriptions   Medication Sig    calcium polycarbophil (FIBERCON PO) Take  by mouth.  gemfibrozil (LOPID) 600 mg tablet Take 600 mg by mouth two (2) times a day.  glimepiride (AMARYL) 2 mg tablet Take  by mouth every morning.  torsemide (DEMADEX) 20 mg tablet Take  by mouth daily.  pioglitazone (ACTOS) 30 mg tablet Take  by mouth daily.     levomilnacipran (FETZIMA) 20 mg capsule Take 20 mg by mouth daily.  metoprolol tartrate (LOPRESSOR) 25 mg tablet Take 0.5 Tabs by mouth two (2) times a day.  co-enzyme Q-10 (CO Q-10) 100 mg capsule Take 100 mg by mouth daily.  senna-docusate (PERICOLACE) 8.6-50 mg per tablet Take 1 Tab by mouth daily.  biotin 2,500 mcg tab Take  by mouth.  pitavastatin (LIVALO) 4 mg tab tablet Take 4 mg by mouth daily.  traZODone (DESYREL) 100 mg tablet Take 100 mg by mouth nightly.  aspirin delayed-release 81 mg tablet Take  by mouth daily.  ferrous sulfate (IRON) 325 mg (65 mg iron) tablet Take  by mouth Daily (before breakfast).  magnesium 250 mg Tab Take  by mouth daily.  oxyCODONE-acetaminophen (PERCOCET)  mg per tablet Take 1 Tab by mouth as needed.  warfarin (COUMADIN) 7.5 mg tablet Take 7.5 mg by mouth once as needed (7.5 mg x 5 days then 5mg for 1 day then repeat).  metFORMIN (GLUCOPHAGE) 500 mg tablet Take  by mouth two (2) times daily (with meals).  amLODIPine (NORVASC) 2.5 mg tablet Take 2.5 mg by mouth daily.  esomeprazole (NEXIUM) 40 mg capsule Take 40 mg by mouth daily.  DULoxetine (CYMBALTA) 60 mg capsule Take 30 mg by mouth daily.  CALCIUM CARBONATE (CALCIUM 600) 1,500 (600) mg Tab Take 500 mg by mouth once.  cyanocobalamin (VITAMIN B-12) 2,000 mcg TbER Take 2,000 mcg by mouth once.  doxycycline hyclate (DORYX) 75 mg TbEC Take 100 mg by mouth daily.  omega-3 fatty acids-vitamin e (FISH OIL) 1,000 mg Cap Take 1 Cap by mouth daily.  cholecalciferol, vitamin d3, (VITAMIN D) 1,000 unit tablet Take  by mouth daily. No current facility-administered medications for this visit. Visit Vitals    /61    Pulse 80    Ht 5' 8\" (1.727 m)    Wt 85.3 kg (188 lb)    BMI 28.59 kg/m2         Physical Exam   Constitutional: She is oriented to person, place, and time. She appears well-developed and well-nourished. HENT:   Head: Normocephalic and atraumatic.    Eyes: Conjunctivae are normal.   Neck: Neck supple. No JVD present. No tracheal deviation present. No thyromegaly present. Cardiovascular: Normal rate and regular rhythm. PMI is not displaced. Exam reveals no gallop and no decreased pulses. Murmur heard. Early systolic murmur is present  at the upper right sternal border  Pulmonary/Chest: No respiratory distress. She has no wheezes. She has no rales. She exhibits no tenderness. Abdominal: Soft. There is no tenderness. Musculoskeletal: She exhibits no edema. Neurological: She is alert and oriented to person, place, and time. Skin: Skin is warm. Psychiatric: She has a normal mood and affect. Ms. Sol Tolentino has a reminder for a \"due or due soon\" health maintenance. I have asked that she contact her primary care provider for follow-up on this health maintenance. No flowsheet data found. Impression:cta-2014    Small amount of residual thrombus remains in the left lateral wall of the distal thoracic aorta.  The large luminal filling defect is no longer evident.  No new filling defects. Ulcerated plaque at the abdominal aorta at the level of the JOLLY posteriorly also stable in appearance. Mesenteric narrowings as above similar to prior exam.    Resolving splenic infarct. No recurrence of pancreatitis. Hepatic steatosis. Splenic and renal infarcts as seen previously. Interpretation Summary 9/2018  · Calculated left ventricular ejection fraction is 55%. Normal left ventricular wall motion, no regional wall motion abnormality noted. Mild (grade 1) left ventricular diastolic dysfunction. · Trace mitral valve regurgitation. · Trivial pericardial effusion. Effusion is is fibrinous. Procedure Conclusion 9/2018  Nuclear Stress Test   Negative myocardial perfusion imaging. Myocardial perfusion imaging supports a low risk stress test.   There is a prior study available for comparison. As compared to the previous study, there are no significant changes. Normal EF:% 57      Interpretation Summary   · Gated SPECT: Left ventricular function post-stress was normal. Calculated ejection fraction is 61%. · Baseline ECG: Normal sinus rhythm. · Negative stress electrocardiogram.  · Left ventricular perfusion is normal.  · Negative myocardial perfusion imaging. Myocardial perfusion imaging supports a low risk stress test.       Assessment       ICD-10-CM ICD-9-CM    1. Atherosclerosis of native coronary artery of native heart without angina pectoris I25.10 414.01     stable  negative stress test  monitor   2. Old myocardial infarction I25.2 412     stable   3. Hyperlipidemia, unspecified hyperlipidemia type E78.5 272.4     continue statin   4. Postsurgical percutaneous transluminal coronary angioplasty status Z98.61 V45.82     stable     10/2018  History of aortic mural thrombus on Coumadin followed by PCP. Recent concern on GI bleed being evaluated by Dr. Nica Camp. Cardiac status remains stable  Medications Discontinued During This Encounter   Medication Reason    predniSONE (DELTASONE) 10 mg tablet Not A Current Medication    polyethylene glycol (MIRALAX) 17 gram/dose powder Not A Current Medication    furosemide (LASIX) 20 mg tablet Not A Current Medication    psyllium husk (DAILY FIBER PO) Not A Current Medication       No orders of the defined types were placed in this encounter. Follow-up Disposition:  Return in about 6 months (around 4/3/2019).

## 2018-10-03 NOTE — PROGRESS NOTES
Patient brought medications list    1. Have you been to the ER, urgent care clinic since your last visit? Hospitalized since your last visit? No    2. Have you seen or consulted any other health care providers outside of the [address] [place] [address] since your last visit? Include any pap smears or colon screening.  No

## 2018-10-17 ENCOUNTER — HOSPITAL ENCOUNTER (OUTPATIENT)
Age: 63
Discharge: HOME | End: 2018-10-17
Attending: ORTHOPAEDIC SURGERY
Payer: MEDICARE

## 2018-10-17 DIAGNOSIS — M48.061 SPINAL STENOSIS OF LUMBAR REGION, UNSPECIFIED WHETHER NEUROGENIC CLAUDICATION PRESENT: ICD-10-CM

## 2018-10-17 PROCEDURE — 72148 MRI LUMBAR SPINE W/O DYE: CPT

## 2019-01-01 ENCOUNTER — APPOINTMENT (OUTPATIENT)
Dept: CT IMAGING | Age: 64
DRG: 342 | End: 2019-01-01
Attending: EMERGENCY MEDICINE
Payer: MEDICARE

## 2019-01-01 ENCOUNTER — APPOINTMENT (OUTPATIENT)
Dept: GENERAL RADIOLOGY | Age: 64
DRG: 342 | End: 2019-01-01
Attending: PHYSICIAN ASSISTANT
Payer: MEDICARE

## 2019-01-01 ENCOUNTER — ANESTHESIA EVENT (OUTPATIENT)
Dept: SURGERY | Age: 64
DRG: 342 | End: 2019-01-01
Payer: MEDICARE

## 2019-01-01 ENCOUNTER — HOSPITAL ENCOUNTER (INPATIENT)
Age: 64
LOS: 3 days | Discharge: HOME | DRG: 342 | End: 2019-01-04
Attending: EMERGENCY MEDICINE
Payer: MEDICARE

## 2019-01-01 ENCOUNTER — ANESTHESIA (OUTPATIENT)
Dept: SURGERY | Age: 64
DRG: 342 | End: 2019-01-01
Payer: MEDICARE

## 2019-01-01 DIAGNOSIS — K35.30 ACUTE APPENDICITIS WITH LOCALIZED PERITONITIS, WITHOUT PERFORATION, ABSCESS, OR GANGRENE: ICD-10-CM

## 2019-01-01 DIAGNOSIS — K35.30 ACUTE APPENDICITIS WITH LOCALIZED PERITONITIS, WITHOUT PERFORATION OR GANGRENE, UNSPECIFIED WHETHER ABSCESS PRESENT: Primary | ICD-10-CM

## 2019-01-01 PROBLEM — K35.80 ACUTE APPENDICITIS: Status: ACTIVE | Noted: 2019-01-01

## 2019-01-01 LAB
ABO + RH BLD: NORMAL
ALBUMIN SERPL-MCNC: 3.6 G/DL (ref 3.4–5)
ALBUMIN/GLOB SERPL: 0.9 {RATIO} (ref 0.8–1.7)
ALP SERPL-CCNC: 69 U/L (ref 45–117)
ALT SERPL-CCNC: 14 U/L (ref 13–56)
ANION GAP SERPL CALC-SCNC: 5 MMOL/L (ref 3–18)
APPEARANCE UR: CLEAR
APTT PPP: 57.3 SEC (ref 23–36.4)
AST SERPL-CCNC: 8 U/L (ref 15–37)
BACTERIA URNS QL MICRO: ABNORMAL /HPF
BASOPHILS # BLD: 0 K/UL (ref 0–0.1)
BASOPHILS NFR BLD: 0 % (ref 0–2)
BILIRUB SERPL-MCNC: 0.3 MG/DL (ref 0.2–1)
BILIRUB UR QL: NEGATIVE
BLOOD GROUP ANTIBODIES SERPL: NORMAL
BUN SERPL-MCNC: 11 MG/DL (ref 7–18)
BUN/CREAT SERPL: 10 (ref 12–20)
CALCIUM SERPL-MCNC: 9.2 MG/DL (ref 8.5–10.1)
CHLORIDE SERPL-SCNC: 106 MMOL/L (ref 100–108)
CO2 SERPL-SCNC: 26 MMOL/L (ref 21–32)
COLOR UR: YELLOW
CREAT SERPL-MCNC: 1.05 MG/DL (ref 0.6–1.3)
DIFFERENTIAL METHOD BLD: ABNORMAL
EOSINOPHIL # BLD: 0 K/UL (ref 0–0.4)
EOSINOPHIL NFR BLD: 0 % (ref 0–5)
EPITH CASTS URNS QL MICRO: ABNORMAL /LPF (ref 0–5)
ERYTHROCYTE [DISTWIDTH] IN BLOOD BY AUTOMATED COUNT: 14.5 % (ref 11.6–14.5)
EST. AVERAGE GLUCOSE BLD GHB EST-MCNC: 126 MG/DL
FLUAV AG NPH QL IA: NEGATIVE
FLUBV AG NOSE QL IA: NEGATIVE
GLOBULIN SER CALC-MCNC: 4 G/DL (ref 2–4)
GLUCOSE BLD STRIP.AUTO-MCNC: 104 MG/DL (ref 70–110)
GLUCOSE BLD STRIP.AUTO-MCNC: 133 MG/DL (ref 70–110)
GLUCOSE BLD STRIP.AUTO-MCNC: 183 MG/DL (ref 70–110)
GLUCOSE SERPL-MCNC: 143 MG/DL (ref 74–99)
GLUCOSE UR STRIP.AUTO-MCNC: NEGATIVE MG/DL
HBA1C MFR BLD: 6 % (ref 4.2–5.6)
HCT VFR BLD AUTO: 37.5 % (ref 35–45)
HGB BLD-MCNC: 12.6 G/DL (ref 12–16)
HGB UR QL STRIP: ABNORMAL
HYALINE CASTS URNS QL MICRO: ABNORMAL /LPF (ref 0–2)
INR PPP: 1.6 (ref 0.8–1.2)
INR PPP: 2.5 (ref 0.8–1.2)
INR PPP: 2.7 (ref 0.8–1.2)
KETONES UR QL STRIP.AUTO: NEGATIVE MG/DL
LEUKOCYTE ESTERASE UR QL STRIP.AUTO: NEGATIVE
LIPASE SERPL-CCNC: 105 U/L (ref 73–393)
LYMPHOCYTES # BLD: 2.3 K/UL (ref 0.9–3.6)
LYMPHOCYTES NFR BLD: 14 % (ref 21–52)
MCH RBC QN AUTO: 30.8 PG (ref 24–34)
MCHC RBC AUTO-ENTMCNC: 33.6 G/DL (ref 31–37)
MCV RBC AUTO: 91.7 FL (ref 74–97)
MONOCYTES # BLD: 1.9 K/UL (ref 0.05–1.2)
MONOCYTES NFR BLD: 11 % (ref 3–10)
NEUTS SEG # BLD: 12.1 K/UL (ref 1.8–8)
NEUTS SEG NFR BLD: 75 % (ref 40–73)
NITRITE UR QL STRIP.AUTO: NEGATIVE
PH UR STRIP: 5 [PH] (ref 5–8)
PLATELET # BLD AUTO: 327 K/UL (ref 135–420)
PMV BLD AUTO: 10.7 FL (ref 9.2–11.8)
POTASSIUM SERPL-SCNC: 4.1 MMOL/L (ref 3.5–5.5)
PROT SERPL-MCNC: 7.6 G/DL (ref 6.4–8.2)
PROT UR STRIP-MCNC: NEGATIVE MG/DL
PROTHROMBIN TIME: 19.1 SEC (ref 11.5–15.2)
PROTHROMBIN TIME: 26.9 SEC (ref 11.5–15.2)
PROTHROMBIN TIME: 28.8 SEC (ref 11.5–15.2)
RBC # BLD AUTO: 4.09 M/UL (ref 4.2–5.3)
RBC #/AREA URNS HPF: ABNORMAL /HPF (ref 0–5)
SODIUM SERPL-SCNC: 137 MMOL/L (ref 136–145)
SP GR UR REFRACTOMETRY: 1.01 (ref 1–1.03)
SPECIMEN EXP DATE BLD: NORMAL
UROBILINOGEN UR QL STRIP.AUTO: 0.2 EU/DL (ref 0.2–1)
WBC # BLD AUTO: 16.3 K/UL (ref 4.6–13.2)
WBC URNS QL MICRO: ABNORMAL /HPF (ref 0–4)

## 2019-01-01 PROCEDURE — 77030010351 HC TRCR ENDOSC VSTP COVD -B

## 2019-01-01 PROCEDURE — 99283 EMERGENCY DEPT VISIT LOW MDM: CPT

## 2019-01-01 PROCEDURE — 77030018846 HC SOL IRR STRL H20 ICUM -A

## 2019-01-01 PROCEDURE — 74176 CT ABD & PELVIS W/O CONTRAST: CPT

## 2019-01-01 PROCEDURE — 77030012012 HC AIRWY OP SFT TELE -A: Performed by: NURSE ANESTHETIST, CERTIFIED REGISTERED

## 2019-01-01 PROCEDURE — 77030009411 HC ELECTRD PRB J&J -C

## 2019-01-01 PROCEDURE — 77030002916 HC SUT ETHLN J&J -A

## 2019-01-01 PROCEDURE — 77030018706 HC CORD MPLR COVD -A

## 2019-01-01 PROCEDURE — 71046 X-RAY EXAM CHEST 2 VIEWS: CPT

## 2019-01-01 PROCEDURE — 77030037051 HC TRCR ENDOSC VRSPRT BLD COVD -B

## 2019-01-01 PROCEDURE — 77030002933 HC SUT MCRYL J&J -A

## 2019-01-01 PROCEDURE — 96375 TX/PRO/DX INJ NEW DRUG ADDON: CPT

## 2019-01-01 PROCEDURE — 74011636637 HC RX REV CODE- 636/637

## 2019-01-01 PROCEDURE — 36415 COLL VENOUS BLD VENIPUNCTURE: CPT

## 2019-01-01 PROCEDURE — 77030011810 HC STPLR ENDOSC J&J -G

## 2019-01-01 PROCEDURE — 77030008517 HC TBNG INSUF ENDO STOR -B

## 2019-01-01 PROCEDURE — 74011000250 HC RX REV CODE- 250

## 2019-01-01 PROCEDURE — 74011250636 HC RX REV CODE- 250/636: Performed by: NURSE ANESTHETIST, CERTIFIED REGISTERED

## 2019-01-01 PROCEDURE — 85610 PROTHROMBIN TIME: CPT

## 2019-01-01 PROCEDURE — 77030009967 HC RELD STPLR ENDOSC J&J -C

## 2019-01-01 PROCEDURE — 83036 HEMOGLOBIN GLYCOSYLATED A1C: CPT

## 2019-01-01 PROCEDURE — P9059 PLASMA, FRZ BETWEEN 8-24HOUR: HCPCS

## 2019-01-01 PROCEDURE — 77030011296 HC FCPS GRSP ENDOSC J&J -B

## 2019-01-01 PROCEDURE — 74011000258 HC RX REV CODE- 258: Performed by: EMERGENCY MEDICINE

## 2019-01-01 PROCEDURE — 77030031139 HC SUT VCRL2 J&J -A

## 2019-01-01 PROCEDURE — 65270000029 HC RM PRIVATE

## 2019-01-01 PROCEDURE — 77030011265 HC ELECTRD BLD HEX COVD -A

## 2019-01-01 PROCEDURE — 85730 THROMBOPLASTIN TIME PARTIAL: CPT

## 2019-01-01 PROCEDURE — 74011250636 HC RX REV CODE- 250/636: Performed by: ANESTHESIOLOGY

## 2019-01-01 PROCEDURE — 77030009973 HC RELD STPLR J&J -C

## 2019-01-01 PROCEDURE — 77030035220 HC TRCR ENDOSC BLNTPRT ANCHR COVD -B

## 2019-01-01 PROCEDURE — 77030009932 HC PRB FT CTRL J&J -B

## 2019-01-01 PROCEDURE — 82962 GLUCOSE BLOOD TEST: CPT

## 2019-01-01 PROCEDURE — 74011250637 HC RX REV CODE- 250/637

## 2019-01-01 PROCEDURE — 77030037892

## 2019-01-01 PROCEDURE — 87804 INFLUENZA ASSAY W/OPTIC: CPT

## 2019-01-01 PROCEDURE — 76060000033 HC ANESTHESIA 1 TO 1.5 HR

## 2019-01-01 PROCEDURE — 81001 URINALYSIS AUTO W/SCOPE: CPT

## 2019-01-01 PROCEDURE — 85025 COMPLETE CBC W/AUTO DIFF WBC: CPT

## 2019-01-01 PROCEDURE — 77030012406 HC DRN WND PENRS BARD -A

## 2019-01-01 PROCEDURE — 77030020255 HC SOL INJ LR 1000ML BG

## 2019-01-01 PROCEDURE — 36430 TRANSFUSION BLD/BLD COMPNT: CPT

## 2019-01-01 PROCEDURE — 76210000006 HC OR PH I REC 0.5 TO 1 HR

## 2019-01-01 PROCEDURE — 77030020782 HC GWN BAIR PAWS FLX 3M -B

## 2019-01-01 PROCEDURE — 30233K1 TRANSFUSION OF NONAUTOLOGOUS FROZEN PLASMA INTO PERIPHERAL VEIN, PERCUTANEOUS APPROACH: ICD-10-PCS | Performed by: EMERGENCY MEDICINE

## 2019-01-01 PROCEDURE — 86900 BLOOD TYPING SEROLOGIC ABO: CPT

## 2019-01-01 PROCEDURE — 51798 US URINE CAPACITY MEASURE: CPT

## 2019-01-01 PROCEDURE — 77030008683 HC TU ET CUF COVD -A: Performed by: NURSE ANESTHETIST, CERTIFIED REGISTERED

## 2019-01-01 PROCEDURE — 96374 THER/PROPH/DIAG INJ IV PUSH: CPT

## 2019-01-01 PROCEDURE — 77030008566 HC TBNG SUC IRR J&J -B

## 2019-01-01 PROCEDURE — 83690 ASSAY OF LIPASE: CPT

## 2019-01-01 PROCEDURE — 74011250636 HC RX REV CODE- 250/636

## 2019-01-01 PROCEDURE — 74011250636 HC RX REV CODE- 250/636: Performed by: EMERGENCY MEDICINE

## 2019-01-01 PROCEDURE — 77030010515 HC APPL ENDOCLP LIG J&J -B

## 2019-01-01 PROCEDURE — 77030027138 HC INCENT SPIROMETER -A

## 2019-01-01 PROCEDURE — 77030026438 HC STYL ET INTUB CARD -A: Performed by: NURSE ANESTHETIST, CERTIFIED REGISTERED

## 2019-01-01 PROCEDURE — 77030032490 HC SLV COMPR SCD KNE COVD -B

## 2019-01-01 PROCEDURE — 88304 TISSUE EXAM BY PATHOLOGIST: CPT

## 2019-01-01 PROCEDURE — 76010000149 HC OR TIME 1 TO 1.5 HR

## 2019-01-01 PROCEDURE — 80053 COMPREHEN METABOLIC PANEL: CPT

## 2019-01-01 RX ORDER — OXYCODONE AND ACETAMINOPHEN 5; 325 MG/1; MG/1
1 TABLET ORAL
Status: DISCONTINUED | OUTPATIENT
Start: 2019-01-01 | End: 2019-01-04 | Stop reason: HOSPADM

## 2019-01-01 RX ORDER — MIDAZOLAM HYDROCHLORIDE 1 MG/ML
INJECTION, SOLUTION INTRAMUSCULAR; INTRAVENOUS AS NEEDED
Status: DISCONTINUED | OUTPATIENT
Start: 2019-01-01 | End: 2019-01-01 | Stop reason: HOSPADM

## 2019-01-01 RX ORDER — LORAZEPAM 2 MG/ML
1 INJECTION INTRAMUSCULAR
Status: DISCONTINUED | OUTPATIENT
Start: 2019-01-01 | End: 2019-01-04 | Stop reason: HOSPADM

## 2019-01-01 RX ORDER — INSULIN LISPRO 100 [IU]/ML
INJECTION, SOLUTION INTRAVENOUS; SUBCUTANEOUS ONCE
Status: DISCONTINUED | OUTPATIENT
Start: 2019-01-01 | End: 2019-01-01 | Stop reason: HOSPADM

## 2019-01-01 RX ORDER — ONDANSETRON 2 MG/ML
4 INJECTION INTRAMUSCULAR; INTRAVENOUS ONCE
Status: DISCONTINUED | OUTPATIENT
Start: 2019-01-01 | End: 2019-01-01 | Stop reason: HOSPADM

## 2019-01-01 RX ORDER — INSULIN LISPRO 100 [IU]/ML
INJECTION, SOLUTION INTRAVENOUS; SUBCUTANEOUS
Status: DISCONTINUED | OUTPATIENT
Start: 2019-01-02 | End: 2019-01-01

## 2019-01-01 RX ORDER — BUPIVACAINE HYDROCHLORIDE 2.5 MG/ML
INJECTION, SOLUTION EPIDURAL; INFILTRATION; INTRACAUDAL AS NEEDED
Status: DISCONTINUED | OUTPATIENT
Start: 2019-01-01 | End: 2019-01-01 | Stop reason: HOSPADM

## 2019-01-01 RX ORDER — DEXTROSE 50 % IN WATER (D50W) INTRAVENOUS SYRINGE
25-50 AS NEEDED
Status: DISCONTINUED | OUTPATIENT
Start: 2019-01-01 | End: 2019-01-04 | Stop reason: HOSPADM

## 2019-01-01 RX ORDER — ACETAMINOPHEN 325 MG/1
650 TABLET ORAL
Status: DISCONTINUED | OUTPATIENT
Start: 2019-01-01 | End: 2019-01-04 | Stop reason: HOSPADM

## 2019-01-01 RX ORDER — HYDROMORPHONE HYDROCHLORIDE 2 MG/ML
0.5 INJECTION, SOLUTION INTRAMUSCULAR; INTRAVENOUS; SUBCUTANEOUS
Status: DISCONTINUED | OUTPATIENT
Start: 2019-01-01 | End: 2019-01-01 | Stop reason: HOSPADM

## 2019-01-01 RX ORDER — DEXAMETHASONE SODIUM PHOSPHATE 4 MG/ML
INJECTION, SOLUTION INTRA-ARTICULAR; INTRALESIONAL; INTRAMUSCULAR; INTRAVENOUS; SOFT TISSUE AS NEEDED
Status: DISCONTINUED | OUTPATIENT
Start: 2019-01-01 | End: 2019-01-01 | Stop reason: HOSPADM

## 2019-01-01 RX ORDER — PROPOFOL 10 MG/ML
INJECTION, EMULSION INTRAVENOUS AS NEEDED
Status: DISCONTINUED | OUTPATIENT
Start: 2019-01-01 | End: 2019-01-01 | Stop reason: HOSPADM

## 2019-01-01 RX ORDER — INSULIN LISPRO 100 [IU]/ML
INJECTION, SOLUTION INTRAVENOUS; SUBCUTANEOUS
Status: DISCONTINUED | OUTPATIENT
Start: 2019-01-01 | End: 2019-01-04 | Stop reason: HOSPADM

## 2019-01-01 RX ORDER — LIDOCAINE HYDROCHLORIDE 10 MG/ML
0.1 INJECTION, SOLUTION EPIDURAL; INFILTRATION; INTRACAUDAL; PERINEURAL AS NEEDED
Status: DISCONTINUED | OUTPATIENT
Start: 2019-01-01 | End: 2019-01-01 | Stop reason: HOSPADM

## 2019-01-01 RX ORDER — DIPHENHYDRAMINE HYDROCHLORIDE 50 MG/ML
12.5 INJECTION, SOLUTION INTRAMUSCULAR; INTRAVENOUS
Status: DISCONTINUED | OUTPATIENT
Start: 2019-01-01 | End: 2019-01-04 | Stop reason: HOSPADM

## 2019-01-01 RX ORDER — SODIUM CHLORIDE 9 MG/ML
250 INJECTION, SOLUTION INTRAVENOUS AS NEEDED
Status: DISCONTINUED | OUTPATIENT
Start: 2019-01-01 | End: 2019-01-04 | Stop reason: HOSPADM

## 2019-01-01 RX ORDER — ONDANSETRON 2 MG/ML
4 INJECTION INTRAMUSCULAR; INTRAVENOUS
Status: DISCONTINUED | OUTPATIENT
Start: 2019-01-01 | End: 2019-01-04 | Stop reason: HOSPADM

## 2019-01-01 RX ORDER — CEFAZOLIN SODIUM 1 G/3ML
INJECTION, POWDER, FOR SOLUTION INTRAMUSCULAR; INTRAVENOUS AS NEEDED
Status: DISCONTINUED | OUTPATIENT
Start: 2019-01-01 | End: 2019-01-01 | Stop reason: HOSPADM

## 2019-01-01 RX ORDER — DEXTROSE, SODIUM CHLORIDE, AND POTASSIUM CHLORIDE 5; .45; .15 G/100ML; G/100ML; G/100ML
100 INJECTION INTRAVENOUS CONTINUOUS
Status: DISCONTINUED | OUTPATIENT
Start: 2019-01-01 | End: 2019-01-04 | Stop reason: HOSPADM

## 2019-01-01 RX ORDER — DEXTROSE 50 % IN WATER (D50W) INTRAVENOUS SYRINGE
25-50 AS NEEDED
Status: DISCONTINUED | OUTPATIENT
Start: 2019-01-01 | End: 2019-01-01 | Stop reason: HOSPADM

## 2019-01-01 RX ORDER — AMLODIPINE BESYLATE 2.5 MG/1
2.5 TABLET ORAL DAILY
Status: DISCONTINUED | OUTPATIENT
Start: 2019-01-02 | End: 2019-01-04 | Stop reason: HOSPADM

## 2019-01-01 RX ORDER — FENTANYL CITRATE 50 UG/ML
INJECTION, SOLUTION INTRAMUSCULAR; INTRAVENOUS AS NEEDED
Status: DISCONTINUED | OUTPATIENT
Start: 2019-01-01 | End: 2019-01-01 | Stop reason: HOSPADM

## 2019-01-01 RX ORDER — SODIUM CHLORIDE, SODIUM LACTATE, POTASSIUM CHLORIDE, CALCIUM CHLORIDE 600; 310; 30; 20 MG/100ML; MG/100ML; MG/100ML; MG/100ML
75 INJECTION, SOLUTION INTRAVENOUS CONTINUOUS
Status: DISCONTINUED | OUTPATIENT
Start: 2019-01-01 | End: 2019-01-01 | Stop reason: HOSPADM

## 2019-01-01 RX ORDER — HYDROMORPHONE HYDROCHLORIDE 1 MG/ML
1 INJECTION, SOLUTION INTRAMUSCULAR; INTRAVENOUS; SUBCUTANEOUS
Status: DISCONTINUED | OUTPATIENT
Start: 2019-01-01 | End: 2019-01-04 | Stop reason: HOSPADM

## 2019-01-01 RX ORDER — SODIUM CHLORIDE 0.9 % (FLUSH) 0.9 %
5-10 SYRINGE (ML) INJECTION AS NEEDED
Status: DISCONTINUED | OUTPATIENT
Start: 2019-01-01 | End: 2019-01-04 | Stop reason: HOSPADM

## 2019-01-01 RX ORDER — ONDANSETRON 2 MG/ML
4 INJECTION INTRAMUSCULAR; INTRAVENOUS
Status: COMPLETED | OUTPATIENT
Start: 2019-01-01 | End: 2019-01-01

## 2019-01-01 RX ORDER — LIDOCAINE HYDROCHLORIDE 20 MG/ML
INJECTION, SOLUTION EPIDURAL; INFILTRATION; INTRACAUDAL; PERINEURAL AS NEEDED
Status: DISCONTINUED | OUTPATIENT
Start: 2019-01-01 | End: 2019-01-01 | Stop reason: HOSPADM

## 2019-01-01 RX ORDER — HYDROMORPHONE HYDROCHLORIDE 1 MG/ML
1 INJECTION, SOLUTION INTRAMUSCULAR; INTRAVENOUS; SUBCUTANEOUS ONCE
Status: COMPLETED | OUTPATIENT
Start: 2019-01-01 | End: 2019-01-01

## 2019-01-01 RX ORDER — FAMOTIDINE 10 MG/ML
20 INJECTION INTRAVENOUS EVERY 12 HOURS
Status: DISCONTINUED | OUTPATIENT
Start: 2019-01-01 | End: 2019-01-03

## 2019-01-01 RX ORDER — MAGNESIUM SULFATE 100 %
4 CRYSTALS MISCELLANEOUS AS NEEDED
Status: DISCONTINUED | OUTPATIENT
Start: 2019-01-01 | End: 2019-01-04 | Stop reason: HOSPADM

## 2019-01-01 RX ORDER — VECURONIUM BROMIDE FOR INJECTION 1 MG/ML
INJECTION, POWDER, LYOPHILIZED, FOR SOLUTION INTRAVENOUS AS NEEDED
Status: DISCONTINUED | OUTPATIENT
Start: 2019-01-01 | End: 2019-01-01 | Stop reason: HOSPADM

## 2019-01-01 RX ORDER — ONDANSETRON 2 MG/ML
INJECTION INTRAMUSCULAR; INTRAVENOUS AS NEEDED
Status: DISCONTINUED | OUTPATIENT
Start: 2019-01-01 | End: 2019-01-01 | Stop reason: HOSPADM

## 2019-01-01 RX ORDER — SUCCINYLCHOLINE CHLORIDE 20 MG/ML
INJECTION INTRAMUSCULAR; INTRAVENOUS AS NEEDED
Status: DISCONTINUED | OUTPATIENT
Start: 2019-01-01 | End: 2019-01-01 | Stop reason: HOSPADM

## 2019-01-01 RX ORDER — SODIUM CHLORIDE 0.9 % (FLUSH) 0.9 %
5-10 SYRINGE (ML) INJECTION EVERY 8 HOURS
Status: DISCONTINUED | OUTPATIENT
Start: 2019-01-01 | End: 2019-01-04 | Stop reason: HOSPADM

## 2019-01-01 RX ORDER — MAGNESIUM SULFATE 100 %
4 CRYSTALS MISCELLANEOUS AS NEEDED
Status: DISCONTINUED | OUTPATIENT
Start: 2019-01-01 | End: 2019-01-01 | Stop reason: HOSPADM

## 2019-01-01 RX ADMIN — SODIUM CHLORIDE 1000 ML: 900 INJECTION, SOLUTION INTRAVENOUS at 11:01

## 2019-01-01 RX ADMIN — FENTANYL CITRATE 50 MCG: 50 INJECTION, SOLUTION INTRAMUSCULAR; INTRAVENOUS at 19:38

## 2019-01-01 RX ADMIN — MIDAZOLAM HYDROCHLORIDE 2 MG: 1 INJECTION, SOLUTION INTRAMUSCULAR; INTRAVENOUS at 19:10

## 2019-01-01 RX ADMIN — ONDANSETRON 4 MG: 2 INJECTION INTRAMUSCULAR; INTRAVENOUS at 23:10

## 2019-01-01 RX ADMIN — FAMOTIDINE 20 MG: 10 INJECTION INTRAVENOUS at 19:06

## 2019-01-01 RX ADMIN — SODIUM CHLORIDE, SODIUM LACTATE, POTASSIUM CHLORIDE, AND CALCIUM CHLORIDE 75 ML/HR: 600; 310; 30; 20 INJECTION, SOLUTION INTRAVENOUS at 18:50

## 2019-01-01 RX ADMIN — FENTANYL CITRATE 100 MCG: 50 INJECTION, SOLUTION INTRAMUSCULAR; INTRAVENOUS at 19:16

## 2019-01-01 RX ADMIN — FENTANYL CITRATE 50 MCG: 50 INJECTION, SOLUTION INTRAMUSCULAR; INTRAVENOUS at 20:27

## 2019-01-01 RX ADMIN — HYDROMORPHONE HYDROCHLORIDE 1 MG: 1 INJECTION, SOLUTION INTRAMUSCULAR; INTRAVENOUS; SUBCUTANEOUS at 11:01

## 2019-01-01 RX ADMIN — ONDANSETRON 4 MG: 2 INJECTION INTRAMUSCULAR; INTRAVENOUS at 11:00

## 2019-01-01 RX ADMIN — ONDANSETRON 4 MG: 2 INJECTION INTRAMUSCULAR; INTRAVENOUS at 20:14

## 2019-01-01 RX ADMIN — PROPOFOL 180 MG: 10 INJECTION, EMULSION INTRAVENOUS at 19:16

## 2019-01-01 RX ADMIN — HYDROMORPHONE HYDROCHLORIDE 0.5 MG: 2 INJECTION INTRAMUSCULAR; INTRAVENOUS; SUBCUTANEOUS at 21:10

## 2019-01-01 RX ADMIN — Medication 10 ML: at 23:21

## 2019-01-01 RX ADMIN — LIDOCAINE HYDROCHLORIDE 80 MG: 20 INJECTION, SOLUTION EPIDURAL; INFILTRATION; INTRACAUDAL; PERINEURAL at 19:16

## 2019-01-01 RX ADMIN — OXYCODONE AND ACETAMINOPHEN 1 TABLET: 5; 325 TABLET ORAL at 23:02

## 2019-01-01 RX ADMIN — SODIUM CHLORIDE 250 ML: 900 INJECTION, SOLUTION INTRAVENOUS at 15:40

## 2019-01-01 RX ADMIN — CEFAZOLIN SODIUM 2 G: 1 INJECTION, POWDER, FOR SOLUTION INTRAMUSCULAR; INTRAVENOUS at 19:26

## 2019-01-01 RX ADMIN — INSULIN LISPRO 2 UNITS: 100 INJECTION, SOLUTION INTRAVENOUS; SUBCUTANEOUS at 23:20

## 2019-01-01 RX ADMIN — SUCCINYLCHOLINE CHLORIDE 100 MG: 20 INJECTION INTRAMUSCULAR; INTRAVENOUS at 19:16

## 2019-01-01 RX ADMIN — HYDROMORPHONE HYDROCHLORIDE 1 MG: 1 INJECTION, SOLUTION INTRAMUSCULAR; INTRAVENOUS; SUBCUTANEOUS at 15:28

## 2019-01-01 RX ADMIN — DEXAMETHASONE SODIUM PHOSPHATE 8 MG: 4 INJECTION, SOLUTION INTRA-ARTICULAR; INTRALESIONAL; INTRAMUSCULAR; INTRAVENOUS; SOFT TISSUE at 19:27

## 2019-01-01 RX ADMIN — PIPERACILLIN SODIUM AND TAZOBACTAM SODIUM 3.38 G: 3; .375 INJECTION, POWDER, LYOPHILIZED, FOR SOLUTION INTRAVENOUS at 13:36

## 2019-01-01 RX ADMIN — DEXTROSE MONOHYDRATE, SODIUM CHLORIDE, AND POTASSIUM CHLORIDE 100 ML/HR: 50; 4.5; 1.49 INJECTION, SOLUTION INTRAVENOUS at 23:05

## 2019-01-01 RX ADMIN — VECURONIUM BROMIDE FOR INJECTION 4 MG: 1 INJECTION, POWDER, LYOPHILIZED, FOR SOLUTION INTRAVENOUS at 19:17

## 2019-01-01 RX ADMIN — FAMOTIDINE 20 MG: 10 INJECTION INTRAVENOUS at 23:06

## 2019-01-01 NOTE — PROGRESS NOTES
conducted an initial consultation and Spiritual Assessment for Arnold Porter, who is a 59 y.o.,female. Patients Primary Language is: [place]. According to the patients EMR Gnosticism Affiliation is: Church.  
 
The reason the Patient came to the hospital is:  
Patient Active Problem List  
 Diagnosis Date Noted  Acute appendicitis 01/01/2019  Gastrointestinal hemorrhage 06/25/2018  Hypercoagulable state ([facility] [address] 06/25/2018  Aortic mural thrombus ([facility] 11/24/2014  Coronary atherosclerosis of native coronary artery  Postsurgical percutaneous transluminal coronary angioplasty status  Old myocardial infarction  Hyperlipidemia The  provided the following Interventions: 
Initiated a relationship of care and support. Explored issues of boogie, belief, spirituality and Pentecostal/ritual needs while hospitalized. Listened empathically. Provided chaplaincy education. Provided information about Spiritual Care Services. Offered prayer and assurance of continued prayers on patient's behalf. Chart reviewed. The following outcomes where achieved: 
Patient shared limited information about both their medical narrative and spiritual journey/beliefs.  confirmed Patient's Gnosticism Affiliation. Patient processed feeling about current hospitalization. Patient expressed gratitude for 's visit. Assessment: 
Patient does not have any Pentecostal/cultural needs that will affect patients preferences in health care. There are no spiritual or Pentecostal issues which require intervention at this time. Plan: 
Chaplains will continue to follow and will provide pastoral care on an as needed/requested basis.  recommends bedside caregivers page  on duty if patient shows signs of acute spiritual or emotional distress. [id number]  
[phone]

## 2019-01-01 NOTE — ED TRIAGE NOTES
Patient complains of abd pain and bilateral leg pain . Patient has a hx of blood clot and states that it feels the same way. Patient complains of having chills.

## 2019-01-01 NOTE — ANESTHESIA PREPROCEDURE EVALUATION
Anesthetic History No history of anesthetic complications Review of Systems / Medical History Patient summary reviewed and pertinent labs reviewed Pulmonary Within defined limits Neuro/Psych Within defined limits Cardiovascular Hypertension Past MI and CAD Exercise tolerance: >4 METS Comments: H/o cadiac thrombus. On coumadin. Last dose last night. 3 units of ffp in  
GI/Hepatic/Renal 
  
 
 
Renal disease Endo/Other Diabetes: type 2 Other Findings Comments:  
 
 
  
 
 
 
 
Physical Exam 
 
Airway Mallampati: II 
TM Distance: 4 - 6 cm Neck ROM: normal range of motion Mouth opening: Normal 
 
 Cardiovascular Regular rate and rhythm,  S1 and S2 normal,  no murmur, click, rub, or gallop Rhythm: regular Rate: normal 
 
 
 
 Dental 
 
 
Comments: The patient has very poor dentition. Loose, broken, and or missing teeth. I explained the risk of dental damage and or loss. The patient understands and accepts these risks. Pulmonary Breath sounds clear to auscultation Abdominal 
GI exam deferred Other Findings Anesthetic Plan ASA: 3 Anesthesia type: general 
 
 
 
 
Induction: Intravenous Anesthetic plan and risks discussed with: Patient and Spouse Loose veneer discussed with patient and . They understand the risk of loss/damage and accept this risk

## 2019-01-01 NOTE — ED PROVIDER NOTES
[employer] [facility] EMERGENCY DEPT 
 
 
10:37 AM 
 
Date: 1/1/2019 Patient Name: Fabiana Knapp History of Presenting Illness Chief Complaint Patient presents with  Abdominal Pain  Leg Pain History Provided By: Patient Chief Complaint: Abd Pain Duration:  3-4 hours Timing:  Constant Location: Abdomen Quality: Aching Severity: Severe Modifying Factors: No relief with Percocet taken at home. Associated Symptoms: nausea (pt has taken 2 doses of anti-nausea medications at home) 59 y.o. female with noted past medical history who presents to the emergency department with c/o severe, constant, aching abdominal pain onset 3-4 hours. Pt reports a sudden onset of abdominal pain radiating from under her right ribs down around her belly. Denies any recent trauma to her abdomen or abnormal food intake. Says she took 2 Percocet's at home with no relief. Her  at bedside does report that she was in the ED previously and had a 6 inch aortic blood clot and that she is on Coumadin now. Pt did recently have a kidney infection and was seen at [facility], she was prescribed 2 medications but states the infection has not resolved. She has drug allergies to NTG, Morphine, Sulfa drugs and Tetanus vaccines. She is a current everyday smoker (2.5 PPD), no etoh or drug use. Associated sx include nausea which she has taken 2 doses of anti-nausea medication. Denies any fever, chills, CP, SOB, vomiting, diarrhea and any urinary sx. No further concerns or complaints at this time. No other complaints. Nursing notes regarding the HPI and triage nursing notes were reviewed. Prior medical records were reviewed. Current Facility-Administered Medications Medication Dose Route Frequency Provider Last Rate Last Dose  piperacillin-tazobactam (ZOSYN) 3.375 g in 0.9% sodium chloride (MBP/ADV) 100 mL MBP  3.375 g IntraVENous NOW Cecily Sánchez MD      
 
Current Outpatient Medications Medication Sig Dispense Refill  calcium polycarbophil (FIBERCON PO) Take  by mouth.  gemfibrozil (LOPID) 600 mg tablet Take 600 mg by mouth two (2) times a day.  glimepiride (AMARYL) 2 mg tablet Take  by mouth every morning.  torsemide (DEMADEX) 20 mg tablet Take  by mouth daily.  pioglitazone (ACTOS) 30 mg tablet Take  by mouth daily.  levomilnacipran (FETZIMA) 20 mg capsule Take 20 mg by mouth daily.  metoprolol tartrate (LOPRESSOR) 25 mg tablet Take 0.5 Tabs by mouth two (2) times a day. 90 Tab 3  
 co-enzyme Q-10 (CO Q-10) 100 mg capsule Take 100 mg by mouth daily.  senna-docusate (PERICOLACE) 8.6-50 mg per tablet Take 1 Tab by mouth daily.  biotin 2,500 mcg tab Take  by mouth.  pitavastatin (LIVALO) 4 mg tab tablet Take 4 mg by mouth daily.  traZODone (DESYREL) 100 mg tablet Take 100 mg by mouth nightly.  aspirin delayed-release 81 mg tablet Take  by mouth daily.  ferrous sulfate (IRON) 325 mg (65 mg iron) tablet Take  by mouth Daily (before breakfast).  magnesium 250 mg Tab Take  by mouth daily.  oxyCODONE-acetaminophen (PERCOCET)  mg per tablet Take 1 Tab by mouth as needed.  warfarin (COUMADIN) 7.5 mg tablet Take 7.5 mg by mouth once as needed (7.5 mg x 5 days then 5mg for 1 day then repeat).  metFORMIN (GLUCOPHAGE) 500 mg tablet Take  by mouth two (2) times daily (with meals).  amLODIPine (NORVASC) 2.5 mg tablet Take 2.5 mg by mouth daily.  esomeprazole (NEXIUM) 40 mg capsule Take 40 mg by mouth daily.  DULoxetine (CYMBALTA) 60 mg capsule Take 30 mg by mouth daily.  CALCIUM CARBONATE (CALCIUM 600) 1,500 (600) mg Tab Take 500 mg by mouth once.  cyanocobalamin (VITAMIN B-12) 2,000 mcg TbER Take 2,000 mcg by mouth once.  doxycycline hyclate (DORYX) 75 mg TbEC Take 100 mg by mouth daily.  omega-3 fatty acids-vitamin e (FISH OIL) 1,000 mg Cap Take 1 Cap by mouth daily.  cholecalciferol, vitamin d3, (VITAMIN D) 1,000 unit tablet Take  by mouth daily. Past History Past Medical History: 
Past Medical History:  
Diagnosis Date  Anemia  Aortic mural thrombus ([facility] [address] 2014  
 on coumadin followed by dr Eleanor Paulson  Aortic thrombus ([facility] 13  CAD (coronary artery disease)  Clot Aortic clot  Clotting disorder ([facility]  Coronary atherosclerosis of native coronary artery Stable, 18 mos. Since pce, will d/c effient  Diabetes mellitus  Gastrointestinal hemorrhage 2018 No clear etiology of GI bleed. Recent workup noted  Heart abnormalities  Hypercholesterolemia  Hypercoagulable state ([facility] [address] 2018 Maintain on Coumadin. INR followed by PCP.  Hyperlipidemia  Iron deficiency  MI (myocardial infarction) ([facility]  Old myocardial infarction Post RCA PCI  Other and unspecified hyperlipidemia On multiple meds, pt had side effect of 28 differnet meds, currently on lipofen and livalo  Postsurgical percutaneous transluminal coronary angioplasty status   
 stable  Postsurgical percutaneous transluminal coronary angioplasty status  Raynaud's disease  Screening for depression  Splenic infarction 13  Ulcer Past Surgical History: 
Past Surgical History:  
Procedure Laterality Date  CARDIAC SURG PROCEDURE UNLIST  [place] N/A 2018 SIGMOIDOSCOPY FLEXIBLE performed by Will Lowery MD at [facility]    
  delivery  HX COLONOSCOPY    
 HX CORONARY STENT PLACEMENT    
 RCA, VALDEZ  
 HX GYN    
 HX HEENT    
 HX HYSTERECTOMY    
 abdominal  
 HX ORTHOPAEDIC    
 right shoulder surgery Family History: 
Family History Problem Relation Age of Onset  Hypertension Other   
     essential  
 Heart Attack Other   
     myocardial infarction  Other Other Hypercholesterolemia Social History: Social History Tobacco Use  Smoking status: Current Every Day Smoker Packs/day: 2.50 Years: 40.00 Pack years: 100.00  Smokeless tobacco: Never Used  Tobacco comment: working on quiting; chewing gum and using patch Substance Use Topics  Alcohol use: No  
  Comment: very rare  Drug use: No  
 
 
Allergies: Allergies Allergen Reactions  Latex Other (comments) Blisters and bleeding from blisters.  Nitroglycerin Other (comments) Patient states \"that when given medication last time she went into arrest\"  Morphine Shortness of Breath  Sulfa (Sulfonamide Antibiotics) Nausea Only  Tetanus Vaccines And Toxoid Swelling and Other (comments) fever Patient's primary care provider (as noted in EPIC):  Ana Joyce MD 
 
Review of Systems Constitutional: Negative for chills and fever. Respiratory: Negative for shortness of breath. Cardiovascular: Negative for chest pain. Gastrointestinal: Positive for abdominal pain and nausea (taken anti-nausea medication at home). Negative for diarrhea and vomiting. Genitourinary: Negative. Musculoskeletal: Negative for back pain. Skin: Negative for rash. Neurological: Negative. All other systems reviewed and are negative. Visit Vitals /74 (BP 1 Location: Left arm, BP Patient Position: At rest) Pulse 91 Temp 100.3 °F (37.9 °C) Resp 18 SpO2 96% Patient Vitals for the past 12 hrs: 
 Temp Pulse Resp BP SpO2  
01/01/19 1057     96 % 01/01/19 1017 100.3 °F (37.9 °C) 91 18 127/74 96 % PHYSICAL EXAM: 
 
CONSTITUTIONAL:  Alert, in no apparent distress;  well developed;  well nourished. HEAD:  Normocephalic, atraumatic. EYES:  EOMI. Non-icteric sclera. Normal conjunctiva. ENTM:  Nose:  no rhinorrhea. Throat:  no erythema or exudate, mucous membranes moist. 
NECK:  No JVD. Supple RESPIRATORY:  Chest clear, equal breath sounds, good air movement. CARDIOVASCULAR:  Regular rate and rhythm. No murmurs, rubs, or gallops. GI:  Normal bowel sounds, abdomen soft with diffuse mild to moderate TTP. No rebound or guarding. BACK:  Non-tender. UPPER EXT:  Normal inspection. LOWER EXT:  No edema, no calf tenderness. Distal pulses intact. NEURO:  Moves all four extremities, and grossly normal motor exam. 
SKIN:  No rashes;  Normal for age. PSYCH:  Alert and normal affect. DIFFERENTIAL DIAGNOSES/ MEDICAL DECISION MAKING: 
Gastritis, gerd, peptic ulcer disease, cholecystitis, pancreatitis, gastroenteritis, hepatitis, constipation related pain, appendicitis pain, diverticulitis, urinary tract infection, obstruction, abdominal wall pain, atypical cardiac (ami or anginal pain), referred pain from pulmonary process (pneumonia, empyema), or combination of the above versus many other processes. In female patients, also consider ovarian cyst pain, ovarian torsion, pelvic inflammatory disease, other sources of gyn pain such as uterine fibroids, versus combination of the above and/or numerous other processes/ etiologies. Diagnostic Study Results Abnormal lab results from this emergency department encounter: 
Labs Reviewed CBC WITH AUTOMATED DIFF - Abnormal; Notable for the following components:  
    Result Value WBC 16.3 (*)   
 RBC 4.09 (*) NEUTROPHILS 75 (*) LYMPHOCYTES 14 (*) MONOCYTES 11 (*)   
 ABS. NEUTROPHILS 12.1 (*)   
 ABS. MONOCYTES 1.9 (*) All other components within normal limits METABOLIC PANEL, COMPREHENSIVE - Abnormal; Notable for the following components:  
 Glucose 143 (*)   
 BUN/Creatinine ratio 10 (*)   
 GFR est non-AA 53 (*) AST (SGOT) 8 (*) All other components within normal limits URINALYSIS W/ RFLX MICROSCOPIC - Abnormal; Notable for the following components:  
 Blood SMALL (*) All other components within normal limits PROTHROMBIN TIME + INR - Abnormal; Notable for the following components: Prothrombin time 26.9 (*) INR 2.5 (*) All other components within normal limits URINE MICROSCOPIC ONLY - Abnormal; Notable for the following components:  
 Bacteria FEW (*) All other components within normal limits INFLUENZA A & B AG (RAPID TEST) LIPASE Lab values for this patient within approximately the last 12 hours: 
Recent Results (from the past 12 hour(s)) CBC WITH AUTOMATED DIFF Collection Time: 01/01/19 10:30 AM  
Result Value Ref Range WBC 16.3 (H) 4.6 - 13.2 K/uL  
 RBC 4.09 (L) 4.20 - 5.30 M/uL  
 HGB 12.6 12.0 - 16.0 g/dL HCT 37.5 35.0 - 45.0 % MCV 91.7 74.0 - 97.0 FL  
 MCH 30.8 24.0 - 34.0 PG  
 MCHC 33.6 31.0 - 37.0 g/dL  
 RDW 14.5 11.6 - 14.5 % PLATELET 630 619 - 941 K/uL MPV 10.7 9.2 - 11.8 FL  
 NEUTROPHILS 75 (H) 40 - 73 % LYMPHOCYTES 14 (L) 21 - 52 % MONOCYTES 11 (H) 3 - 10 % EOSINOPHILS 0 0 - 5 % BASOPHILS 0 0 - 2 %  
 ABS. NEUTROPHILS 12.1 (H) 1.8 - 8.0 K/UL  
 ABS. LYMPHOCYTES 2.3 0.9 - 3.6 K/UL  
 ABS. MONOCYTES 1.9 (H) 0.05 - 1.2 K/UL  
 ABS. EOSINOPHILS 0.0 0.0 - 0.4 K/UL  
 ABS. BASOPHILS 0.0 0.0 - 0.1 K/UL  
 DF AUTOMATED METABOLIC PANEL, COMPREHENSIVE Collection Time: 01/01/19 10:30 AM  
Result Value Ref Range Sodium 137 136 - 145 mmol/L Potassium 4.1 3.5 - 5.5 mmol/L Chloride 106 100 - 108 mmol/L  
 CO2 26 21 - 32 mmol/L Anion gap 5 3.0 - 18 mmol/L Glucose 143 (H) 74 - 99 mg/dL BUN 11 7.0 - 18 MG/DL Creatinine 1.05 0.6 - 1.3 MG/DL  
 BUN/Creatinine ratio 10 (L) 12 - 20 GFR est AA >60 >60 ml/min/1.73m2 GFR est non-AA 53 (L) >60 ml/min/1.73m2 Calcium 9.2 8.5 - 10.1 MG/DL Bilirubin, total 0.3 0.2 - 1.0 MG/DL  
 ALT (SGPT) 14 13 - 56 U/L  
 AST (SGOT) 8 (L) 15 - 37 U/L Alk. phosphatase 69 45 - 117 U/L Protein, total 7.6 6.4 - 8.2 g/dL Albumin 3.6 3.4 - 5.0 g/dL Globulin 4.0 2.0 - 4.0 g/dL A-G Ratio 0.9 0.8 - 1.7 PROTHROMBIN TIME + INR  
 Collection Time: 01/01/19 10:30 AM  
Result Value Ref Range Prothrombin time 26.9 (H) 11.5 - 15.2 sec INR 2.5 (H) 0.8 - 1.2 LIPASE Collection Time: 01/01/19 10:30 AM  
Result Value Ref Range Lipase 105 73 - 393 U/L  
INFLUENZA A & B AG (RAPID TEST) Collection Time: 01/01/19 11:07 AM  
Result Value Ref Range Influenza A Antigen NEGATIVE  NEG Influenza B Antigen NEGATIVE  NEG    
URINALYSIS W/ RFLX MICROSCOPIC Collection Time: 01/01/19 11:43 AM  
Result Value Ref Range Color YELLOW Appearance CLEAR Specific gravity 1.012 1.005 - 1.030    
 pH (UA) 5.0 5.0 - 8.0 Protein NEGATIVE  NEG mg/dL Glucose NEGATIVE  NEG mg/dL Ketone NEGATIVE  NEG mg/dL Bilirubin NEGATIVE  NEG Blood SMALL (A) NEG Urobilinogen 0.2 0.2 - 1.0 EU/dL Nitrites NEGATIVE  NEG Leukocyte Esterase NEGATIVE  NEG    
URINE MICROSCOPIC ONLY Collection Time: 01/01/19 11:43 AM  
Result Value Ref Range WBC 0 to 1 0 - 4 /hpf  
 RBC 0 to 3 0 - 5 /hpf Epithelial cells FEW 0 - 5 /lpf Bacteria FEW (A) NEG /hpf Hyaline cast 0 to 1 0 - 2 /lpf Radiologist and cardiologist interpretations if available at time of this note: Xr Chest Pa Lat Result Date: 1/1/2019 EXAM:  XR CHEST AP AND LAT INDICATION:   fever COMPARISON: 5/24/2018. FINDINGS: The cardiac and mediastinal silhouette are within normal limits. Pulmonary vasculature is within normal limits. No pneumothorax or pleural effusions. Mild opacity peripheral left lung base on the AP view. Mild thoracic spondylosis. [place] Dials Impression: Atelectasis versus developing infiltrate in the peripheral left lung base. Follow-up recommended to ensure resolution. Ct Abd Pelv Wo Cont Result Date: 1/1/2019 CT abdomen and pelvis without enhancement INDICATION: Epigastric pain and nausea COMPARISON: CT 3/19/2018, CT 5/12/2017 TECHNIQUE: axial images obtained from the diaphragm to the level of the pubic symphysis without nonionic intravenous contrast. All CT scans at this facility are performed using dose optimization technique as appropriate to the performed exam, to include automated exposure control, adjustment of the mA and/or kV according to patient's size (Including appropriate matching for site-specific examinations), or use of iterative reconstruction technique. Findings: Please note, lack of intravenous contrast renders this study suboptimal for evaluating solid abdominal organs. ABDOMEN FINDINGS: Lung bases: Mild dependent atelectasis Liver: Negative. Spleen: Stable morphology with subcapsular chronic lobular mass about 3 x 1.5 cm Pancreas: Negative Adrenal glands: Negative Gallbladder: Distended. No pericholecystic inflammation Left Kidney: No hydronephrosis or nephrolithiasis Right Kidney: No hydronephrosis or nephrolithiasis Aorta:Nonaneurysmal. Mild atherosclerosis. Lymph nodes: No enlargement Bowel: No obstruction. Mild sigmoid diverticulosis No free intraperitoneal fluid or gas. PELVIS FINDINGS:  Bowel: Non obstructed. Appendix: Distended appendix in the central mid abdomen 14 mm with wall thickening and periappendiceal fat stranding and inflammation (best demonstrated on coronal image 27-32). The tip of the appendix reach the inferior aspect of the transverse duodenum. Adjacent prominent but subcentimeter mesenteric lymph nodes. No free intraperitoneal fluid or gas. Bladder: Mildly distended and unremarkable Uterus: Absent. Lymph nodes: No enlargement SKELETAL FINDINGS: No destructive lesion. Advanced degenerative disc disease L5-S1 and T11-T12. IMPRESSION: 1. Findings compatible with acute appendicitis without evidence for free fluid, free air or abscess. 2. Stable chronic splenic mass. Discussed #1 with Dr. Jair Emerson Medication(s) ordered for patient during this emergency visit encounter: 
Medications  
piperacillin-tazobactam (ZOSYN) 3.375 g in 0.9% sodium chloride (MBP/ADV) 100 mL MBP (not administered)  
sodium chloride 0.9 % bolus infusion 1,000 mL (1,000 mL IntraVENous New Bag 1/1/19 1101) HYDROmorphone (PF) (DILAUDID) injection 1 mg (1 mg IntraVENous Given 1/1/19 1101) ondansetron (ZOFRAN) injection 4 mg (4 mg IntraVENous Given 1/1/19 1100) Medical Decision Making I am the first provider for this patient. I reviewed the vital signs, available nursing notes, past medical history, past surgical history, family history and social history. Vital Signs:  Reviewed the patient's vital signs. ED COURSE AND MEDICAL DECISION MAKING:   
The patient's pain improved in the ED with the noted medications. Patient has not UTI sxs. Based on the patient's history of presenting illness, physical examination, laboratory, radiographic, and/or tests results, and response to medical interventions, I believe the patient most likely is having acute appendicitis. The patient's discomfort did improve narcotic pain medication as noted on the chart. Initial antibiotic therapy was also started in the emergency department, as well as IV fluids. Admit to Surgery The patient was presented to the accepting surgeon, Dr. Ventura Anthony. As the emergency physician, I wrote courtesy admission orders for the accepting surgeon. I subsequently placed the noted hospitalist on the treatment team. 
 
 
Coding Diagnoses Clinical Impression: 1. Acute appendicitis with localized peritonitis, without perforation or gangrene, unspecified whether abscess present Disposition Disposition:  Admit. FANI Issa Board Certified Emergency Physician Provider Attestation: If a scribe was utilized in generation of this patient record, I personally performed the services described in the documentation, reviewed the documentation, as recorded by the scribe in my presence, and it accurately records the patient's history of presenting illness, review of systems, patient physical examination, and procedures performed by me as the attending physician. Jorge Connelly M.D. ASHLEY Board Certified Emergency Physician 1/1/2019. 
10:38 AM

## 2019-01-01 NOTE — ED NOTES
TRANSFER - OUT REPORT: 
 
Verbal report given to ANABELLE Dickson(name) on Jose Vora  being transferred to [facility](unit) for routine progression of care Report consisted of patients Situation, Background, Assessment and  
Recommendations(SBAR). Information from the following report(s) ED Summary was reviewed with the receiving nurse. Lines:  
Peripheral IV 01/01/19 Left Antecubital (Active) Site Assessment Clean, dry, & intact 1/1/2019 10:35 AM  
Phlebitis Assessment 0 1/1/2019 10:35 AM  
Infiltration Assessment 0 1/1/2019 10:35 AM  
Dressing Status Clean, dry, & intact 1/1/2019 10:35 AM  
Dressing Type Tape;Transparent 1/1/2019 10:35 AM  
Hub Color/Line Status Pink;Flushed;Patent 1/1/2019 10:35 AM  
Action Taken Blood drawn 1/1/2019 10:35 AM  
Alcohol Cap Used No 1/1/2019 10:35 AM  
  
 
Opportunity for questions and clarification was provided.    
 
Patient transported with:

## 2019-01-01 NOTE — H&P
[facility] HISTORY AND PHYSICAL Kenisha Moraes 
MR#: [id number] : 1955 ACCOUNT #: [de-identified] ADMIT DATE: 2019 CHIEF COMPLAINT:  Right lower quadrant pain. HISTORY OF PRESENT ILLNESS:  The patient is a relatively unhealthy 46-year-old woman who gets along actually better than expected who presents with about a day of mid abdominal pain now in her right lower quadrant. She felt poorly and had several episodes of nausea and came to the emergency room where workup ensued that showed acute appendicitis. This was diagnosed with CT scanning. PAST MEDICAL HISTORY:  Significant for hypercoagulable state with a history 5 years ago in  or so of a mural thrombus in her aorta. This has subsequently completely resolved. She has not had complications of her hypercoagulable state in some time as she is on chronic Coumadin therapy. She had, however, prior to this, a splenic infarction and renal infarction. Her renal function subsequently improved back to normal. 
 
Further past medical history includes anemia, coronary artery disease, diabetes, dyslipidemia, iron deficiency, history of myocardial infarction in  with stents placed, several angioplasties, Raynaud's phenomenon, splenic infarction and some form of peptic ulcer. PAST SURGICAL HISTORY:  , colonoscopy, cardiac stents, hysterectomy and right shoulder repair. OUTPATIENT MEDICATIONS:  Include Lopressor 25 mg daily, CoQ10 100 mg daily, Beverly-Colace 1 by mouth daily, Livalo 4 mg daily, Desyrel 100 mg nightly, 81 mg aspirin, iron, Lasix 20 mg daily, Percocet p.r.n., Coumadin 7.5 mg daily, Glucophage 500 mg by mouth b.i.d., Norvasc 2.5 mg daily, Nexium 40 mg daily, Cymbalta 30 mg b.i.d., calcium 600 mg, doxycycline 30 mg daily, Lipofen 150 mg cap daily, omega 3 fatty acids 1 cap daily and vitamin D3 1000 units 1 cap daily. ALLERGIES:  LATEX, NITROGLYCERIN, MORPHINE, SULFA, AND TETANUS. SOCIAL HISTORY:  She is legally , but lives with her . She is a current smoker, nondrinker. Lives at home. FAMILY HISTORY:  Positive for heart disease, hypertension and dyslipidemia. Her vital signs are stable. REVIEW OF SYSTEMS:  Negative for constitutional complaints. Negative for HEENT complaints. Negative for pulmonary complaints. Negative for cardiac complaints. Positive for abdominal complaints with right lower quadrant pain that is reasonably severe. Negative for skin complaints. Negative for orthopedic complaints. Negative for  complaints. Negative for neurologic complaints. Negative for psychiatric complaints. PHYSICAL EXAMINATION: 
GENERAL:  She is awake, alert and oriented x3, no apparent distress. HEAD AND NECK:  Normocephalic, atraumatic. Her pupils are equal.  Her sclerae are anicteric. Her nose and throat are clear. CHEST:  Clear bilaterally. HEART:  Has a regular rate and rhythm. ABDOMEN:  Mildly obese, soft, but tender in her right lower quadrant with guarding and mild rebound. EXTREMITIES:  Warm and dry. NEUROLOGIC:  She is intact. LABORATORY DATA:  Show white count of 16.3 with a shift to the left and H and H of 12.6 and 37.5 and platelet count of 364. Her chemistries show normal electrolytes. Her glucose is 143 and her BUN and creatinine are 11 and 1.05. IMPRESSION:  The patient has serious vascular pathology with hypercoagulable state in a setting of acute appendicitis by history as well as CT. She is currently anticoagulated with an INR of 2.5. She will get FFP to correct her 2.5 state and then a laparoscopic appendectomy. I have discussed this with her and her . They understand and are anxious to proceed. Darlynn Alpers, MD JLR/TN 
D: 01/01/2019 15:44    
T: 01/01/2019 16:08 JOB #: [id number]

## 2019-01-02 LAB
ANION GAP SERPL CALC-SCNC: 5 MMOL/L (ref 3–18)
BASOPHILS # BLD: 0 K/UL (ref 0–0.1)
BASOPHILS NFR BLD: 0 % (ref 0–2)
BLD PROD TYP BPU: NORMAL
BPU ID: NORMAL
BUN SERPL-MCNC: 10 MG/DL (ref 7–18)
BUN/CREAT SERPL: 12 (ref 12–20)
CALCIUM SERPL-MCNC: 8.5 MG/DL (ref 8.5–10.1)
CHLORIDE SERPL-SCNC: 107 MMOL/L (ref 100–108)
CO2 SERPL-SCNC: 28 MMOL/L (ref 21–32)
CREAT SERPL-MCNC: 0.83 MG/DL (ref 0.6–1.3)
DIFFERENTIAL METHOD BLD: ABNORMAL
EOSINOPHIL # BLD: 0 K/UL (ref 0–0.4)
EOSINOPHIL NFR BLD: 0 % (ref 0–5)
ERYTHROCYTE [DISTWIDTH] IN BLOOD BY AUTOMATED COUNT: 14.8 % (ref 11.6–14.5)
GLUCOSE BLD STRIP.AUTO-MCNC: 112 MG/DL (ref 70–110)
GLUCOSE BLD STRIP.AUTO-MCNC: 115 MG/DL (ref 70–110)
GLUCOSE BLD STRIP.AUTO-MCNC: 139 MG/DL (ref 70–110)
GLUCOSE BLD STRIP.AUTO-MCNC: 197 MG/DL (ref 70–110)
GLUCOSE SERPL-MCNC: 183 MG/DL (ref 74–99)
HCT VFR BLD AUTO: 30.8 % (ref 35–45)
HGB BLD-MCNC: 10 G/DL (ref 12–16)
INR PPP: 1.7 (ref 0.8–1.2)
LYMPHOCYTES # BLD: 1.5 K/UL (ref 0.9–3.6)
LYMPHOCYTES NFR BLD: 11 % (ref 21–52)
MCH RBC QN AUTO: 30.3 PG (ref 24–34)
MCHC RBC AUTO-ENTMCNC: 32.5 G/DL (ref 31–37)
MCV RBC AUTO: 93.3 FL (ref 74–97)
MONOCYTES # BLD: 1.3 K/UL (ref 0.05–1.2)
MONOCYTES NFR BLD: 9 % (ref 3–10)
NEUTS SEG # BLD: 11.6 K/UL (ref 1.8–8)
NEUTS SEG NFR BLD: 80 % (ref 40–73)
PLATELET # BLD AUTO: 263 K/UL (ref 135–420)
PMV BLD AUTO: 11.2 FL (ref 9.2–11.8)
POTASSIUM SERPL-SCNC: 4.3 MMOL/L (ref 3.5–5.5)
PROTHROMBIN TIME: 19.7 SEC (ref 11.5–15.2)
RBC # BLD AUTO: 3.3 M/UL (ref 4.2–5.3)
SODIUM SERPL-SCNC: 140 MMOL/L (ref 136–145)
STATUS OF UNIT,%ST: NORMAL
UNIT DIVISION, %UDIV: 0
WBC # BLD AUTO: 14.5 K/UL (ref 4.6–13.2)

## 2019-01-02 PROCEDURE — 74011636637 HC RX REV CODE- 636/637

## 2019-01-02 PROCEDURE — 65270000029 HC RM PRIVATE

## 2019-01-02 PROCEDURE — 74011250636 HC RX REV CODE- 250/636

## 2019-01-02 PROCEDURE — 36415 COLL VENOUS BLD VENIPUNCTURE: CPT

## 2019-01-02 PROCEDURE — 77030005538 HC CATH URETH FOL44 BARD -B

## 2019-01-02 PROCEDURE — 74011250637 HC RX REV CODE- 250/637

## 2019-01-02 PROCEDURE — 85610 PROTHROMBIN TIME: CPT

## 2019-01-02 PROCEDURE — 82962 GLUCOSE BLOOD TEST: CPT

## 2019-01-02 PROCEDURE — 85025 COMPLETE CBC W/AUTO DIFF WBC: CPT

## 2019-01-02 PROCEDURE — 0DTJ4ZZ RESECTION OF APPENDIX, PERCUTANEOUS ENDOSCOPIC APPROACH: ICD-10-PCS

## 2019-01-02 PROCEDURE — 74011000258 HC RX REV CODE- 258

## 2019-01-02 PROCEDURE — 80048 BASIC METABOLIC PNL TOTAL CA: CPT

## 2019-01-02 RX ORDER — IBUPROFEN 200 MG
1 TABLET ORAL DAILY
Status: DISCONTINUED | OUTPATIENT
Start: 2019-01-02 | End: 2019-01-04 | Stop reason: HOSPADM

## 2019-01-02 RX ORDER — WARFARIN SODIUM 5 MG/1
10 TABLET ORAL
Status: COMPLETED | OUTPATIENT
Start: 2019-01-02 | End: 2019-01-02

## 2019-01-02 RX ORDER — ENOXAPARIN SODIUM 100 MG/ML
40 INJECTION SUBCUTANEOUS EVERY 12 HOURS
Status: DISCONTINUED | OUTPATIENT
Start: 2019-01-02 | End: 2019-01-03

## 2019-01-02 RX ORDER — IPRATROPIUM BROMIDE AND ALBUTEROL SULFATE 2.5; .5 MG/3ML; MG/3ML
3 SOLUTION RESPIRATORY (INHALATION)
Status: DISCONTINUED | OUTPATIENT
Start: 2019-01-02 | End: 2019-01-04 | Stop reason: HOSPADM

## 2019-01-02 RX ADMIN — Medication 10 ML: at 21:13

## 2019-01-02 RX ADMIN — FAMOTIDINE 20 MG: 10 INJECTION INTRAVENOUS at 20:54

## 2019-01-02 RX ADMIN — OXYCODONE AND ACETAMINOPHEN 1 TABLET: 5; 325 TABLET ORAL at 23:51

## 2019-01-02 RX ADMIN — WARFARIN SODIUM 10 MG: 5 TABLET ORAL at 08:52

## 2019-01-02 RX ADMIN — HYDROMORPHONE HYDROCHLORIDE 1 MG: 1 INJECTION, SOLUTION INTRAMUSCULAR; INTRAVENOUS; SUBCUTANEOUS at 00:55

## 2019-01-02 RX ADMIN — DEXTROSE MONOHYDRATE, SODIUM CHLORIDE, AND POTASSIUM CHLORIDE 100 ML/HR: 50; 4.5; 1.49 INJECTION, SOLUTION INTRAVENOUS at 21:08

## 2019-01-02 RX ADMIN — HYDROMORPHONE HYDROCHLORIDE 1 MG: 1 INJECTION, SOLUTION INTRAMUSCULAR; INTRAVENOUS; SUBCUTANEOUS at 21:46

## 2019-01-02 RX ADMIN — OXYCODONE AND ACETAMINOPHEN 1 TABLET: 5; 325 TABLET ORAL at 10:16

## 2019-01-02 RX ADMIN — OXYCODONE AND ACETAMINOPHEN 1 TABLET: 5; 325 TABLET ORAL at 04:33

## 2019-01-02 RX ADMIN — ENOXAPARIN SODIUM 40 MG: 40 INJECTION SUBCUTANEOUS at 15:21

## 2019-01-02 RX ADMIN — INSULIN LISPRO 2 UNITS: 100 INJECTION, SOLUTION INTRAVENOUS; SUBCUTANEOUS at 09:05

## 2019-01-02 RX ADMIN — Medication 10 ML: at 06:13

## 2019-01-02 RX ADMIN — OXYCODONE AND ACETAMINOPHEN 1 TABLET: 5; 325 TABLET ORAL at 15:21

## 2019-01-02 RX ADMIN — FAMOTIDINE 20 MG: 10 INJECTION INTRAVENOUS at 08:34

## 2019-01-02 RX ADMIN — OXYCODONE AND ACETAMINOPHEN 1 TABLET: 5; 325 TABLET ORAL at 19:31

## 2019-01-02 RX ADMIN — DEXTROSE MONOHYDRATE, SODIUM CHLORIDE, AND POTASSIUM CHLORIDE 100 ML/HR: 50; 4.5; 1.49 INJECTION, SOLUTION INTRAVENOUS at 11:28

## 2019-01-02 RX ADMIN — HYDROMORPHONE HYDROCHLORIDE 1 MG: 1 INJECTION, SOLUTION INTRAMUSCULAR; INTRAVENOUS; SUBCUTANEOUS at 17:41

## 2019-01-02 RX ADMIN — PIPERACILLIN SODIUM,TAZOBACTAM SODIUM 3.38 G: 3; .375 INJECTION, POWDER, FOR SOLUTION INTRAVENOUS at 08:35

## 2019-01-02 RX ADMIN — HYDROMORPHONE HYDROCHLORIDE 1 MG: 1 INJECTION, SOLUTION INTRAMUSCULAR; INTRAVENOUS; SUBCUTANEOUS at 08:59

## 2019-01-02 RX ADMIN — Medication 10 ML: at 15:27

## 2019-01-02 RX ADMIN — HYDROMORPHONE HYDROCHLORIDE 1 MG: 1 INJECTION, SOLUTION INTRAMUSCULAR; INTRAVENOUS; SUBCUTANEOUS at 13:05

## 2019-01-02 NOTE — PERIOP NOTES
TRANSFER - OUT REPORT: 
 
Verbal report given to [facility] RN on Adan Nuno  being transferred to [facility] for routine post - op Report consisted of patients Situation, Background, Assessment and  
Recommendations(SBAR). Information from the following report(s) SBAR, OR Summary, Procedure Summary, Intake/Output, MAR and Recent Results was reviewed with the receiving nurse. Lines:  
Peripheral IV 01/01/19 Left Antecubital (Active) Site Assessment Clean, dry, & intact 1/1/2019  8:46 PM  
Phlebitis Assessment 0 1/1/2019  8:46 PM  
Infiltration Assessment 0 1/1/2019  8:46 PM  
Dressing Status Clean, dry, & intact 1/1/2019  8:46 PM  
Dressing Type Transparent 1/1/2019  8:46 PM  
Hub Color/Line Status Pink; Infusing;Patent 1/1/2019  8:46 PM  
Action Taken Blood drawn 1/1/2019 10:35 AM  
Alcohol Cap Used No 1/1/2019 10:35 AM  
  
 
Opportunity for questions and clarification was provided. Patient transported with: 
 O2 @ 3 liters Registered Nurse

## 2019-01-02 NOTE — ROUTINE PROCESS
Bedside and Verbal shift change report given to Wale (oncoming nurse) by Kim Mojica RN (offgoing nurse). Report included the following information SBAR, Kardex, Procedure Summary, Intake/Output, MAR and Recent Results.

## 2019-01-02 NOTE — ANESTHESIA POSTPROCEDURE EVALUATION
Procedure(s): 
APPENDECTOMY LAPAROSCOPIC. Anesthesia Post Evaluation Multimodal analgesia: multimodal analgesia used between 6 hours prior to anesthesia start to PACU discharge Patient location during evaluation: bedside Patient participation: complete - patient participated Level of consciousness: awake Pain management: adequate Airway patency: patent Anesthetic complications: no 
Cardiovascular status: stable Respiratory status: acceptable Hydration status: acceptable Post anesthesia nausea and vomiting:  controlled Visit Vitals /63 (BP 1 Location: Right arm, BP Patient Position: At rest) Pulse 83 Temp 37.6 °C (99.7 °F) Resp 20 SpO2 90%

## 2019-01-02 NOTE — OP NOTES
[address] [place] [address]   OPERATIVE REPORT    Rk Wetzel  MR#: [id number]  : 1955  ACCOUNT #: [de-identified]   DATE OF SERVICE: 2019    PREOPERATIVE DIAGNOSIS:  1. Acute appendicitis. 2.  Anticoagulation. POSTOPERATIVE DIAGNOSES:    1. Acute appendicitis. 2.  Anticoagulation. PROCEDURE PERFORMED:  Laparoscopic appendectomy. SURGEON:  Jennifer Jaeger MD    ASSISTANT:  .    ESTIMATED BLOOD LOSS:  Minimal.    COMPLICATIONS:  None. ANESTHESIA:  General.    SPECIMENS REMOVED:  Appendix. IMPLANTS:  #10 flat ANTONI drain. The patient is a 69-year-old woman with hypercoagulable state who presented with signs and symptoms and a CT scan consistent with acute appendicitis. She was taken to the operating room after 3 units of FFP changed her INR from 2.7 to 1.6 for a laparoscopic appendectomy. PROCEDURE:  After appropriate antibiotics and sequential compression stockings and 3 units of FFP, the patient was brought to the operating room and placed in supine position on the OR table. After adequate general anesthesia, her abdomen was prepped and draped to [facility]  standard. An infraumbilical incision was created. Blunt dissection of the fascia performed. The fascia was stay stitched with 0 Vicryl and opened sharply with Metzenbaum scissors. A Alistair port was placed and pneumoperitoneum induced. Generalized exploration revealed some mild adhesions. No obvious pathology. Her appendix was edematous and clearly infected. A second 12 mm port was placed in the left lower quadrant. A 5 mm port placed in the right upper quadrant, all under direct vision. The appendix was grasped and started to fall apart. It was retracted cephalad and the base of the appendix was dissected. The Endo-LESA was used in several shots to remove the appendix and it was brought out through the left lower quadrant port site.   Some bleeding was encountered in the mesoappendix, which was bovied using electrocautery. A piece of Surgicel was placed in this area as well as there was some mild oozing of all her inflamed tissue. A #10 flat ANTONI drain was placed through the left lower quadrant port and brought out through the right 5 mm port, placed in the area of the appendix. It was sutured in with 2-0 nylon. The ports were then withdrawn under direct vision. The 2 large port sites were closed with 0 Vicryl. The remainder of the port sites with 4-0 Monocryl. Steri-Strips and sterile dressings were applied. 0.25% Marcaine with epinephrine was used around the wound. She tolerated the procedure well and was taken to recovery in stable condition.       MD QUITA Elliott / [place]  D: 01/02/2019 10:17     T: 01/02/2019 10:29  JOB #: [id number]

## 2019-01-02 NOTE — ROUTINE PROCESS
2150 Patient received from PACU via bed AAOx4 ,respiration unlabored, [email or web address] 91 % on oxygen 3l NC. Abdomen tender touch, lap sitesx3 dry and clean, ANTONI in place draining sero sanguinous liquid. IV fluids infusing well. 2230 Patient assisted to bathroom, attempted to void but unsuccessful, back in bed. Will continues to monitor. [phone] Patient unable to void bladder scanned reading showed 690 ml. 
0030 Patient voided 100ml of yellow urine. Dr Tanisha Sharif Notified, new order received to insert saleh cath.

## 2019-01-02 NOTE — PROGRESS NOTES
Notified by CNA of patient heart rate 104 and MEWs score of 2. Rechecked patient heart rate (91 beats per minute). Will continue to monitor patient.

## 2019-01-02 NOTE — BRIEF OP NOTE
BRIEF OPERATIVE NOTE Date of Procedure: 2019 Preoperative Diagnosis: Other acute appendicitis [K35.890] Postoperative Diagnosis: Other acute appendicitis [K35.890] Procedure(s): 
APPENDECTOMY LAPAROSCOPIC Surgeon(s) and Role: Mason Marie MD - Primary Surgical Assistant:  
 
Surgical Staff: 
Circ-1: Ricco Neil RN 
Circ-Relief: Phillip Rae RN Scrub Tech-1: Marleny Dangelo Scrub Tech-Relief: Salinas Ramirez Surg Asst-1: Summer Moreno Event Time In Time Out Incision Start  Incision Close Anesthesia: General  
Estimated Blood Loss: min Specimens:  
ID Type Source Tests Collected by Time Destination 1 : APPENDIX Preservative Appendix  Aydin Dorsey MD 2019 1947 Pathology Findings: acute appy without sepsis without SIRS localized peritonitis Complications: 0 Implants: * No implants in log *

## 2019-01-02 NOTE — PROGRESS NOTES
Reason for Admission:  Acute appendicitis RRAT Score:    16 Plan for utilizing home health:    No orders at this time. Likelihood of Readmission:   Moderate Do you (patient/family) have any concerns for transition/discharge? No 
 
Transition of Care Plan:    
 
Initial assessment completed with patient and spouse. Face sheet information confirmed:  Yes. The patient requests we communicate with patient and Rushie Gearing in reference to medical care. This patient lives in a 2 story home with 1/2 step to enter with spouse. .  Patient was able to navigate steps as needed. Prior to hospitalization, patient was considered to be independent : Yes . Cognitive status of patient: Alert and oriented to person, place, time, and situation. Patient has a current ACP document on file: No 
The patient's spouse will be available to transport patient home upon discharge. The patient owns no DME. Patient is not currently active with home health. Patient has not ever stayed in a skilled nursing facility or rehab. Pt is active with outpatient physical therapy with Dr. Harleen Ford. Freedom of choice signed: No  
 
Currently, the discharge plan is for home with spouse. Care Management Interventions PCP Verified by CM: Yes(per pt, saw pcp 2 weeks ago.) Mode of Transport at Discharge: Self Transition of Care Consult (CM Consult): Discharge Planning MyChart Signup: No 
Discharge Durable Medical Equipment: No 
Physical Therapy Consult: No 
Occupational Therapy Consult: No 
Speech Therapy Consult: No 
Current Support Network: Lives with Spouse Confirm Follow Up Transport: Family Plan discussed with Pt/Family/Caregiver: Yes Discharge Location Discharge Placement: Home KRUPA Talley, RN Pager # [phone] Care Manager KRUPA Montenegro, RN Pager # [phone] Care Manager

## 2019-01-02 NOTE — PROGRESS NOTES
Bedside and Verbal shift change report given to Adela Beltran RN (oncoming nurse) by Johnson Vivas RN (offgoing nurse). Report included the following information SBAR, Kardex, Intake/Output, MAR and Recent Results.

## 2019-01-03 LAB
ANION GAP SERPL CALC-SCNC: 3 MMOL/L (ref 3–18)
BASOPHILS # BLD: 0 K/UL (ref 0–0.1)
BASOPHILS NFR BLD: 0 % (ref 0–2)
BUN SERPL-MCNC: 6 MG/DL (ref 7–18)
BUN/CREAT SERPL: 8 (ref 12–20)
CALCIUM SERPL-MCNC: 8.1 MG/DL (ref 8.5–10.1)
CHLORIDE SERPL-SCNC: 108 MMOL/L (ref 100–108)
CO2 SERPL-SCNC: 28 MMOL/L (ref 21–32)
CREAT SERPL-MCNC: 0.77 MG/DL (ref 0.6–1.3)
DIFFERENTIAL METHOD BLD: ABNORMAL
EOSINOPHIL # BLD: 0.2 K/UL (ref 0–0.4)
EOSINOPHIL NFR BLD: 1 % (ref 0–5)
ERYTHROCYTE [DISTWIDTH] IN BLOOD BY AUTOMATED COUNT: 14.8 % (ref 11.6–14.5)
GLUCOSE BLD STRIP.AUTO-MCNC: 108 MG/DL (ref 70–110)
GLUCOSE BLD STRIP.AUTO-MCNC: 123 MG/DL (ref 70–110)
GLUCOSE BLD STRIP.AUTO-MCNC: 126 MG/DL (ref 70–110)
GLUCOSE BLD STRIP.AUTO-MCNC: 97 MG/DL (ref 70–110)
GLUCOSE SERPL-MCNC: 111 MG/DL (ref 74–99)
HCT VFR BLD AUTO: 30.1 % (ref 35–45)
HGB BLD-MCNC: 9.3 G/DL (ref 12–16)
INR PPP: 2 (ref 0.8–1.2)
LYMPHOCYTES # BLD: 3 K/UL (ref 0.9–3.6)
LYMPHOCYTES NFR BLD: 26 % (ref 21–52)
MCH RBC QN AUTO: 29.4 PG (ref 24–34)
MCHC RBC AUTO-ENTMCNC: 30.9 G/DL (ref 31–37)
MCV RBC AUTO: 95.3 FL (ref 74–97)
MONOCYTES # BLD: 1.2 K/UL (ref 0.05–1.2)
MONOCYTES NFR BLD: 10 % (ref 3–10)
NEUTS SEG # BLD: 7 K/UL (ref 1.8–8)
NEUTS SEG NFR BLD: 63 % (ref 40–73)
PLATELET # BLD AUTO: 257 K/UL (ref 135–420)
PMV BLD AUTO: 11.2 FL (ref 9.2–11.8)
POTASSIUM SERPL-SCNC: 3.7 MMOL/L (ref 3.5–5.5)
PROTHROMBIN TIME: 22.6 SEC (ref 11.5–15.2)
RBC # BLD AUTO: 3.16 M/UL (ref 4.2–5.3)
SODIUM SERPL-SCNC: 139 MMOL/L (ref 136–145)
WBC # BLD AUTO: 11.3 K/UL (ref 4.6–13.2)

## 2019-01-03 PROCEDURE — 74011250637 HC RX REV CODE- 250/637

## 2019-01-03 PROCEDURE — 85610 PROTHROMBIN TIME: CPT

## 2019-01-03 PROCEDURE — 85025 COMPLETE CBC W/AUTO DIFF WBC: CPT

## 2019-01-03 PROCEDURE — 36415 COLL VENOUS BLD VENIPUNCTURE: CPT

## 2019-01-03 PROCEDURE — 82962 GLUCOSE BLOOD TEST: CPT

## 2019-01-03 PROCEDURE — 80048 BASIC METABOLIC PNL TOTAL CA: CPT

## 2019-01-03 PROCEDURE — 65270000029 HC RM PRIVATE

## 2019-01-03 PROCEDURE — 74011250636 HC RX REV CODE- 250/636

## 2019-01-03 RX ORDER — WARFARIN SODIUM 5 MG/1
10 TABLET ORAL
Status: COMPLETED | OUTPATIENT
Start: 2019-01-03 | End: 2019-01-03

## 2019-01-03 RX ORDER — FAMOTIDINE 20 MG/1
20 TABLET, FILM COATED ORAL 2 TIMES DAILY
Status: DISCONTINUED | OUTPATIENT
Start: 2019-01-03 | End: 2019-01-04 | Stop reason: HOSPADM

## 2019-01-03 RX ORDER — TRAZODONE HYDROCHLORIDE 100 MG/1
100 TABLET ORAL
Status: DISCONTINUED | OUTPATIENT
Start: 2019-01-03 | End: 2019-01-04 | Stop reason: HOSPADM

## 2019-01-03 RX ADMIN — OXYCODONE AND ACETAMINOPHEN 1 TABLET: 5; 325 TABLET ORAL at 07:49

## 2019-01-03 RX ADMIN — Medication 10 ML: at 21:34

## 2019-01-03 RX ADMIN — OXYCODONE AND ACETAMINOPHEN 1 TABLET: 5; 325 TABLET ORAL at 13:47

## 2019-01-03 RX ADMIN — OXYCODONE AND ACETAMINOPHEN 1 TABLET: 5; 325 TABLET ORAL at 18:52

## 2019-01-03 RX ADMIN — HYDROMORPHONE HYDROCHLORIDE 1 MG: 1 INJECTION, SOLUTION INTRAMUSCULAR; INTRAVENOUS; SUBCUTANEOUS at 10:38

## 2019-01-03 RX ADMIN — ENOXAPARIN SODIUM 40 MG: 40 INJECTION SUBCUTANEOUS at 03:43

## 2019-01-03 RX ADMIN — Medication 10 ML: at 13:49

## 2019-01-03 RX ADMIN — AMLODIPINE BESYLATE 2.5 MG: 2.5 TABLET ORAL at 09:02

## 2019-01-03 RX ADMIN — LORAZEPAM 1 MG: 2 INJECTION INTRAMUSCULAR at 00:53

## 2019-01-03 RX ADMIN — HYDROMORPHONE HYDROCHLORIDE 1 MG: 1 INJECTION, SOLUTION INTRAMUSCULAR; INTRAVENOUS; SUBCUTANEOUS at 03:41

## 2019-01-03 RX ADMIN — TRAZODONE HYDROCHLORIDE 100 MG: 100 TABLET ORAL at 21:35

## 2019-01-03 RX ADMIN — FAMOTIDINE 20 MG: 20 TABLET, FILM COATED ORAL at 18:17

## 2019-01-03 RX ADMIN — HYDROMORPHONE HYDROCHLORIDE 1 MG: 1 INJECTION, SOLUTION INTRAMUSCULAR; INTRAVENOUS; SUBCUTANEOUS at 17:10

## 2019-01-03 RX ADMIN — WARFARIN SODIUM 10 MG: 5 TABLET ORAL at 08:57

## 2019-01-03 RX ADMIN — FAMOTIDINE 20 MG: 10 INJECTION INTRAVENOUS at 08:57

## 2019-01-03 RX ADMIN — HYDROMORPHONE HYDROCHLORIDE 1 MG: 1 INJECTION, SOLUTION INTRAMUSCULAR; INTRAVENOUS; SUBCUTANEOUS at 21:29

## 2019-01-03 NOTE — PROGRESS NOTES
Surgery POD  s/p lap Appy. C/o of pain AF VSS drain 115cc serosang. Removed 
abd soft but tender Stressed OOB and IS Push po 
10mg coumadin today. INR 2.0

## 2019-01-03 NOTE — ROUTINE PROCESS
Bedside and Verbal shift change report given to Alireza Burrell RN (oncoming nurse) by Unique Saals RN (offgoing nurse). Report included the following information SBAR, Kardex and MAR.

## 2019-01-03 NOTE — ROUTINE PROCESS
Bedside and Verbal shift change report given by Leona Christie RN (offgoing nurse) to Aide Novoa RN (oncoming nurse). Report included the following information SBAR, Kardex and MAR.

## 2019-01-03 NOTE — ROUTINE PROCESS
Patient ambulated in the manzo way, tolerated well. Patient, in constant  Incisional pain has been medicated  with percocet and dilaudid. Abdomen tender to touch,lap sites dry and clean, ANTONI drain intact emptied 15 cc of serosanguinous drain. No acute distress noted.

## 2019-01-03 NOTE — ROUTINE PROCESS
Bedside and Verbal shift change report given to Madison Closs (oncoming nurse) by Kade Chamorro RN (offgoing nurse). Report included the following information SBAR, Kardex, ED Summary, Intake/Output, MAR, Recent Results and Med Rec Status.

## 2019-01-03 NOTE — PROGRESS NOTES
This patient meets the following P&T approved criteria and has been converted from IV to oral therapy for the following medication: Famotidine 1. INITIAL IV Therapy Patient has received an initial 48 hours of IV therapy for azithromycin and levetiracetam OR received an initial 24 hours of IV therapy for all other medications. 2. Clinically Stable HR?100 Pulse (Heart Rate): 96 SBP ?90 BP: 118/71 RR ? 24 Resp Rate: 20 Temp < 100.4° F Temp: 99.4 °F (37.4 °C)  
O2 ?90% O2 Sat (%): 93 % 3. Functional GI Tract Please note that a No response means the patient is not a candidate for IV to PO conversion No active NPO order (check order review activity) Yes Taking enteral meds (PO, NG, GT, etc) (check medication activity or order review for a tube) Yes Able to tolerate enteral medications without risk of aspiration  
(check progress notes) Yes Non-tubed patients can swallow without difficulty 
(check progress notes) Yes Eating or tolerating feeds at goal rate  
(check progress notes, order review activity, LDA flowsheet)  Diet =Regular Yes NG access is available if patient is unconscious Yes NG tube, if present,  is not on continuous suction  
(check progress notes) Yes  
Continuous tube feedings can be interrupted for an extended period of time for the drug administration (e.g. Ciprofloxacin) (check progress notes, order review) Yes No severe or persistent nausea or vomiting      
(check progress notes, use of antiemetics) Yes No malabsorption  syndromes  
(e.g. gastrectomy, intestinal resection, bariatric surgery, uncontrolled diarrhea, short bowel syndrome, ileus, gastrointestinal obstruction) (check progress notes) Yes No active gastrointestinal bleeding    
(check progress notes) Yes 4. Infection Specific Criteria (ABX only) Please note that a No response means the patient is not a candidate for IV to PO conversion WBC normal or normalizing (check labs) Yes WBC = Lab Results Component Value Date/Time WBC 11.3 01/03/2019 05:13 AM  
  
Neutropenia (ANC < 1500 cells/microL) NOT present 
(check labs) Yes No infection with high treatment failure rate 
(e.g. meningitis, brain abscess,orbital cellulitis, other central nervous system infections, endophthalmitis, mediastinitis,  IV TMP/SMX treatment for PCP in AIDs, fungemia, ventriculitis, endocarditis, Pseudomonas pneumonia, intra-abdominal abscess, empyema, necrotizing soft tissue infections, osteomyelitis or post-obstructive pneumonia) (check indication for antibiotic, progress notes) Yes 5. Miscellaneous Criteria Please note that a No response means the patient is not a candidate for IV to PO conversion Not on high doses of vasopressor medications  
     (check medication activity) Yes Not actively seizing or risk of actively seizing  
     (check progress notes) Yes Pharmacist Notes: Patient is taking other PO medications. Pharmacy will continue to monitor for the duration of therapy to ensure the patient continues to meet protocol criteria and the conversion remains appropriate.  
 
Bolivar Calabrese 
1/3/2019 1:13 PM

## 2019-01-03 NOTE — PROGRESS NOTES
Problem: Falls - Risk of 
Goal: *Absence of Falls Document Trice Torres Fall Risk and appropriate interventions in the flowsheet. Outcome: Progressing Towards Goal 
Fall Risk Interventions: 
Mobility Interventions: Patient to call before getting OOB Medication Interventions: Patient to call before getting OOB Elimination Interventions: Call light in reach

## 2019-01-03 NOTE — PROGRESS NOTES
Mobility Intervention:  
 
  [] Pt dangled at edge of bed 
  [] Pt assisted OOB to bedside commode 
  [] Pt assisted OOB to chair 
  [] Pt ambulated to bathroom [x] Patient was ambulated in room/hallway Assistive Device Utilized:  
  
 [x] Rolling walker 
 [] Crutches 
 [] Straight Cane 
 [] Knee immobilizer [] IV pole After Mobilization:  
 
[] Patient left in no apparent distress sitting up in chair 
[x] Patient left in no apparent distress in bed 
[x] Call bell left within reach [x] SCDs on & machine turned on 
[x] Ice applied 
[] RN notified 
[] Caregiver present 
[] Bed alarm activated Reason patient not mobilized:  
 
 [] Patient refused 
 [] Nausea/vomiting 
 [] Low blood pressure 
 [] Drowsy/lethargic Pain Rating:  
 
[] 0 [] 1 Assistive Device:       
[] 2 [] 3 [] 4 
[] 5 
[] 6 Assistive Device:       
[] 7 
[] 8 
[] 9 [] 10 Comments:

## 2019-01-04 VITALS
TEMPERATURE: 99.4 F | OXYGEN SATURATION: 94 % | HEART RATE: 86 BPM | DIASTOLIC BLOOD PRESSURE: 71 MMHG | RESPIRATION RATE: 18 BRPM | SYSTOLIC BLOOD PRESSURE: 135 MMHG

## 2019-01-04 LAB
ANION GAP SERPL CALC-SCNC: 5 MMOL/L (ref 3–18)
BASOPHILS # BLD: 0 K/UL (ref 0–0.1)
BASOPHILS NFR BLD: 0 % (ref 0–2)
BUN SERPL-MCNC: 5 MG/DL (ref 7–18)
BUN/CREAT SERPL: 8 (ref 12–20)
CALCIUM SERPL-MCNC: 8.4 MG/DL (ref 8.5–10.1)
CHLORIDE SERPL-SCNC: 109 MMOL/L (ref 100–108)
CO2 SERPL-SCNC: 26 MMOL/L (ref 21–32)
CREAT SERPL-MCNC: 0.62 MG/DL (ref 0.6–1.3)
DIFFERENTIAL METHOD BLD: ABNORMAL
EOSINOPHIL # BLD: 0.2 K/UL (ref 0–0.4)
EOSINOPHIL NFR BLD: 3 % (ref 0–5)
ERYTHROCYTE [DISTWIDTH] IN BLOOD BY AUTOMATED COUNT: 14.6 % (ref 11.6–14.5)
GLUCOSE BLD STRIP.AUTO-MCNC: 103 MG/DL (ref 70–110)
GLUCOSE SERPL-MCNC: 98 MG/DL (ref 74–99)
HCT VFR BLD AUTO: 28.8 % (ref 35–45)
HGB BLD-MCNC: 9.3 G/DL (ref 12–16)
INR PPP: 2.3 (ref 0.8–1.2)
LYMPHOCYTES # BLD: 2 K/UL (ref 0.9–3.6)
LYMPHOCYTES NFR BLD: 25 % (ref 21–52)
MCH RBC QN AUTO: 30 PG (ref 24–34)
MCHC RBC AUTO-ENTMCNC: 32.3 G/DL (ref 31–37)
MCV RBC AUTO: 92.9 FL (ref 74–97)
MONOCYTES # BLD: 0.9 K/UL (ref 0.05–1.2)
MONOCYTES NFR BLD: 11 % (ref 3–10)
NEUTS SEG # BLD: 4.8 K/UL (ref 1.8–8)
NEUTS SEG NFR BLD: 61 % (ref 40–73)
PLATELET # BLD AUTO: 291 K/UL (ref 135–420)
PMV BLD AUTO: 10.9 FL (ref 9.2–11.8)
POTASSIUM SERPL-SCNC: 3.6 MMOL/L (ref 3.5–5.5)
PROTHROMBIN TIME: 25.1 SEC (ref 11.5–15.2)
RBC # BLD AUTO: 3.1 M/UL (ref 4.2–5.3)
SODIUM SERPL-SCNC: 140 MMOL/L (ref 136–145)
WBC # BLD AUTO: 7.9 K/UL (ref 4.6–13.2)

## 2019-01-04 PROCEDURE — 74011250637 HC RX REV CODE- 250/637

## 2019-01-04 PROCEDURE — 80048 BASIC METABOLIC PNL TOTAL CA: CPT

## 2019-01-04 PROCEDURE — 82962 GLUCOSE BLOOD TEST: CPT

## 2019-01-04 PROCEDURE — 74011250636 HC RX REV CODE- 250/636

## 2019-01-04 PROCEDURE — 85025 COMPLETE CBC W/AUTO DIFF WBC: CPT

## 2019-01-04 PROCEDURE — 85610 PROTHROMBIN TIME: CPT

## 2019-01-04 PROCEDURE — 36415 COLL VENOUS BLD VENIPUNCTURE: CPT

## 2019-01-04 RX ORDER — OXYCODONE AND ACETAMINOPHEN 5; 325 MG/1; MG/1
1 TABLET ORAL
Qty: 30 TAB | Refills: 0 | Status: SHIPPED | OUTPATIENT
Start: 2019-01-04 | End: 2019-03-31

## 2019-01-04 RX ADMIN — OXYCODONE AND ACETAMINOPHEN 1 TABLET: 5; 325 TABLET ORAL at 08:10

## 2019-01-04 RX ADMIN — HYDROMORPHONE HYDROCHLORIDE 1 MG: 1 INJECTION, SOLUTION INTRAMUSCULAR; INTRAVENOUS; SUBCUTANEOUS at 03:44

## 2019-01-04 RX ADMIN — OXYCODONE AND ACETAMINOPHEN 1 TABLET: 5; 325 TABLET ORAL at 01:36

## 2019-01-04 RX ADMIN — FAMOTIDINE 20 MG: 20 TABLET, FILM COATED ORAL at 08:10

## 2019-01-04 RX ADMIN — OXYCODONE AND ACETAMINOPHEN 1 TABLET: 5; 325 TABLET ORAL at 13:00

## 2019-01-04 RX ADMIN — Medication 10 ML: at 05:59

## 2019-01-04 NOTE — DISCHARGE INSTRUCTIONS
Patient Education        Appendectomy in Children: What to Expect at [facility] child had an appendectomy. The doctor removed your child's appendix either through several small cuts, called incisions, in the belly (laparoscopic surgery) or through one large incision in the belly (open surgery). The incisions leave scars that usually fade with time. After surgery, your child may feel weak and tired for several days after he or she returns home. Your child's belly may be swollen and painful. Your child may also feel sick to his or her stomach and have diarrhea, constipation, gas, or a headache. This usually goes away in a few days. Most children are back to many of their usual activities about a week after surgery. Your child's body will work just fine without an appendix. You will not have to make any changes in your child's diet or lifestyle. This care sheet gives you a general idea about how long it will take for your child to recover. But each child recovers at a different pace. Follow the steps below to help your child get better as quickly as possible. How can you care for your child at home? Activity    · Allow your child to slowly become more active. Have him or her rest as much as needed. Make sure your child gets enough sleep at night.     · Your child should not ride a bike, play running games or contact sports, or take part in gym class until your doctor says it is okay. It is okay for your child to walk and play with other children or play with toys.     · Until the doctor says it is okay, your child should avoid lifting anything that would make him or her strain. This may include heavy milk containers, a heavy backpack, or a medium-sized pet.     · Your child may shower if the doctor says it is okay. Pat the incision dry after the shower. Do not let your child take a bath for the first 2 weeks, or until the doctor tells you it is okay.  If your child has a drain coming out of the incision, follow the doctor's instructions about bathing.     · Your child will probably be able to go back to school or most of his or her usual activities in 1 to 3 weeks. Diet    · Your child can eat his or her normal diet. If your child's stomach is upset, try bland, low-fat foods like plain rice, broiled chicken, toast, and yogurt.     · Have your child drink plenty of fluids to avoid becoming dehydrated.     · You may notice a change in your child's bowel habits right after surgery. This is common. If your child has not had a bowel movement after a couple of days, call the doctor. Medicines    · Your doctor will tell you if and when your child can restart his or her medicines. The doctor will also give you instructions about your child taking any new medicines.     · If your child's appendix ruptured, you will need to give him or her antibiotics. Give them as instructed. Do not stop using them just because your child feels better. Your child needs to take the full course of antibiotics.     · Your child may need pain medicine for the first week. If the doctor prescribed medicine for severe pain, give it to your child as instructed.     · If your child is not taking a prescription pain medicine, you can give him or her an over-the-counter medicine such as acetaminophen (Tylenol) or ibuprofen (Advil, Motrin) for mild pain. Be safe with medicines. Read and follow all instructions on the label.     · Do not give your child two or more pain medicines at the same time unless the doctor told you to.     · If your child feels sick to his or her stomach:  ? Do not give pain medicines on an empty stomach. Give your child pain medicines after meals or with a snack (unless the doctor has told you not to). ? Ask the doctor for a different pain medicine if you think the one you have makes your child sick. ? Talk to your child's doctor about trying a motion sickness medicine.    Incision care    · If your child had an open surgery, the incision may be closed with surgical staples. The doctor will take these out in 7 to 10 days.     · If your child has strips of tape on the incision, leave the tape on for a week or until it falls off.     · Follow your doctor's instructions about cleaning the area around your child's incision.     · Keep the area clean and dry.     · If your child's appendix ruptured, he or she may have an incision with packing in it. Change the packing as often as your child's doctor tells you to. ? Packing changes may hurt at first. Giving your child pain medicine about half an hour before you change the dressing can help. ? If the dressing sticks to the wound, try soaking it with warm water for about 10 minutes before you remove it. You can do this by having your child take a shower or by placing a wet washcloth over the dressing. ? Remove the old packing and rinse out the incision with water. Gently pat the top area dry. ? The size of the incision is the guide for how much gauze you need to put inside. Fold the gauze over once, but do not wad it up so that it hurts. Put it in the wound carefully. You want to keep the sides of the wound from touching. A cotton swab may help you push in the gauze as needed. ? Put a gauze pad over the wound, and tape it down. ? You may notice greenish gray fluid seeping from the wound as it starts to heal. This is normal. It is a sign that the wound is healing. Other instructions    · If your child's appendix ruptured, he or she may have a tube that drains fluid from the incision. The doctor will tell you how to take care of it. Follow-up care is a key part of your child's treatment and safety. Be sure to make and go to all appointments, and call your doctor if your child is having problems. It's also a good idea to know your child's test results and keep a list of the medicines your child takes. When should you call for help?   Call 911 anytime you think your child may need emergency care. For example, call if:    · Your child passes out (loses consciousness).     · Your child is short of breath.    Call the doctor now or seek immediate medical care if:    · Your child is sick to his or her stomach and cannot drink fluids.     · Your child has pain that does not get better after he or she takes pain medicine.     · Your child has loose stitches, or the incision comes open.     · Bright red blood has soaked through the bandage over your child's incision.     · Your child has signs of infection, such as:  ? Increased pain, swelling, warmth, or redness. ? Red streaks leading from the incision. ? Pus draining from the incision. ? A fever.     · Your child cannot pass stools or gas.     · Your child has signs of a blood clot in the leg (called a deep vein thrombosis), such as:  ? Pain in the calf, back of the knee, thigh, or groin. ? Redness and swelling in the leg or groin.    Watch closely for changes in your child's health, and be sure to contact the doctor if your child has any problems. Where can you learn more? Go to http://bc-angelina.info/. Enter K818 in the search box to learn more about \"Appendectomy in Children: What to Expect at Home. \"  Current as of: March 28, 2018  Content Version: 11.8  © 6930-2331 Healthwise, Incorporated. Care instructions adapted under license by [employer] (which disclaims liability or warranty for this information). If you have questions about a medical condition or this instruction, always ask your healthcare professional. [place] [address] any warranty or liability for your use of this information. Appydrink Activation    Thank you for requesting access to App[employer]. Please follow the instructions below to securely access and download your online medical record.  App[employer]ink allows you to send messages to your doctor, view your test results, renew your prescriptions, schedule appointments, and more. How Do I Sign Up? 1. In your internet browser, go to [email or web address]."[email or web address]"  2. Click on the First Time User? Click Here link in the Sign In box. You will be redirect to the New Member Sign Up page. 3. Enter your Sales Rabbit Access Code exactly as it appears below. You will not need to use this code after youve completed the sign-up process. If you do not sign up before the expiration date, you must request a new code. Sales Rabbit Access Code: [email or web address]  Expires: 2019 10:16 AM (This is the date your [employer] Rabbit access code will )    4. Enter the last four digits of your Social Security Number (xxxx) and Date of Birth (mm/dd/yyyy) as indicated and click Submit. You will be taken to the next sign-up page. 5. Create a Sales Rabbit ID. This will be your Sales Rabbit login ID and cannot be changed, so think of one that is secure and easy to remember. 6. Create a [employer] Rabbit password. You can change your password at any time. 7. Enter your Password Reset Question and Answer. This can be used at a later time if you forget your password. 8. Enter your e-mail address. You will receive e-mail notification when new information is available in [address] 9. Click Sign Up. You can now view and download portions of your medical record. 10. Click the Download Summary menu link to download a portable copy of your medical information. Additional Information    If you have questions, please visit the Frequently Asked Questions section of the [employer] website at [email or web address] Remember, [employer] Rabbit is NOT to be used for urgent needs. For medical emergencies, dial 911.       Patient armband removed and shredded    DISCHARGE SUMMARY from Nurse    PATIENT INSTRUCTIONS:    After general anesthesia or intravenous sedation, for 24 hours or while taking prescription Narcotics:  · Limit your activities  · Do not drive and operate hazardous machinery  · Do not make important personal or business decisions  · Do  not drink alcoholic beverages  · If you have not urinated within 8 hours after discharge, please contact your surgeon on call. Report the following to your surgeon:  · Excessive pain, swelling, redness or odor of or around the surgical area  · Temperature over 100.5  · Nausea and vomiting lasting longer than 4 hours or if unable to take medications  · Any signs of decreased circulation or nerve impairment to extremity: change in color, persistent  numbness, tingling, coldness or increase pain  · Any questions    What to do at Home:  Recommended activity: Activity as tolerated    If you experience any of the following symptoms Nausea,vomiting,shortness of breath, chest pain, fever greater than 100.5, please follow up with PCP. *  Please give a list of your current medications to your Primary Care Provider. *  Please update this list whenever your medications are discontinued, doses are      changed, or new medications (including over-the-counter products) are added. *  Please carry medication information at all times in case of emergency situations. These are general instructions for a healthy lifestyle:    No smoking/ No tobacco products/ Avoid exposure to second hand smoke  Surgeon General's Warning:  Quitting smoking now greatly reduces serious risk to your health. Obesity, smoking, and sedentary lifestyle greatly increases your risk for illness    A healthy diet, regular physical exercise & weight monitoring are important for maintaining a healthy lifestyle    You may be retaining fluid if you have a history of heart failure or if you experience any of the following symptoms:  Weight gain of 3 pounds or more overnight or 5 pounds in a week, increased swelling in our hands or feet or shortness of breath while lying flat in bed. Please call your doctor as soon as you notice any of these symptoms; do not wait until your next office visit.     Recognize signs and symptoms of STROKE:    F-face looks uneven    A-arms unable to move or move unevenly    S-speech slurred or non-existent    T-time-call 911 as soon as signs and symptoms begin-DO NOT go       Back to bed or wait to see if you get better-TIME IS BRAIN. Warning Signs of HEART ATTACK     Call 911 if you have these symptoms:   Chest discomfort. Most heart attacks involve discomfort in the center of the chest that lasts more than a few minutes, or that goes away and comes back. It can feel like uncomfortable pressure, squeezing, fullness, or pain.  Discomfort in other areas of the upper body. Symptoms can include pain or discomfort in one or both arms, the back, neck, jaw, or stomach.  Shortness of breath with or without chest discomfort.  Other signs may include breaking out in a cold sweat, nausea, or lightheadedness. Don't wait more than five minutes to call 911 - MINUTES MATTER! Fast action can save your life. Calling 911 is almost always the fastest way to get lifesaving treatment. Emergency Medical Services staff can begin treatment when they arrive -- up to an hour sooner than if someone gets to the hospital by car. The discharge information has been reviewed with the patient. The patient verbalized understanding. Discharge medications reviewed with the patient and appropriate educational materials and side effects teaching were provided.   ___________________________________________________________________________________________________________________________________

## 2019-01-04 NOTE — DISCHARGE SUMMARY
[address] Jaden Lozada  MR#: [id number]  : 1955  ACCOUNT #: [de-identified]   ADMIT DATE: 2019  DISCHARGE DATE: 2019    DIAGNOSES:   1. Acute appendicitis. 2.  Hypercoagulable state. HOSPITAL COURSE:  The patient is a 78-year-old woman who presented with signs and symptoms of acute appendicitis on the 1st day of the year, who was also anticoagulated for a history of hypercoagulable state. She was brought to [facility] from [facility], given 3 units of FFP, which normalized her INR. She was then taken to the operating room where a laparoscopic appendectomy went in an uneventful fashion. A drain was placed at that point, but overall she did well and over the ensuing several days her only issue really was pain control. She began to tolerate liquids and then a regular diet, was up and out of bed, walking the hallways by postoperative day 2, and was weaning off her pain meds. She was also given  Coumadin on the postop day #1 as well as Lovenox, and her INR asha to 2.3 prior to discharge. She is currently tolerating a regular diet, out of bed, moving her bowels, and overall doing well. She is being discharged. I have pulled her drain out and she is being discharged to home with Percocet 5/325 one p.o. q.6 p.r.n. with followup in a week to 10 days. She is also to call her PCP for further manipulation of her anticoagulants. DISPOSITION:  Home.       MD QUITA Elliott/JUDIT  D: 2019 08:55     T: 2019 09:16  JOB #: [id number]

## 2019-01-04 NOTE — PROGRESS NOTES
Discharge planning Discharge order noted for today. Met with pt and  are agreeable to the transition plan today. No needs identified from CM. Transport has been arranged with . CM will continue to monitor for transitional needs for a safe discharge from [facility]. NOEL RodríguezN, RN Pager # [phone] Care Manager

## 2019-01-04 NOTE — ROUTINE PROCESS
Bedside and Verbal shift change report given to Madelyn Davis RN (oncoming nurse) by Melanie Martin RN (offgoing nurse). Report included the following information SBAR, Kardex and MAR.

## 2019-01-04 NOTE — ROUTINE PROCESS
Patient in bed AAOx4, no respiratory distress noted. Medicated with dilaudid 1 mg for back and incisional pain. Drain site dressing saturated with sero sanguinous liquid changed 4x4 gauze and tape applied. , patient tolerated procedure.

## 2019-01-04 NOTE — ROUTINE PROCESS
Bedside and Verbal shift change report given to Mellisa Suarez (oncoming nurse) by Kade Chamorro RN (offgoing nurse). Report included the following information SBAR, Kardex, Procedure Summary, Intake/Output, MAR and Recent Results.

## 2019-01-04 NOTE — ROUTINE PROCESS
Assisted to bathroom, voided 400 cc of yellow urine. Patient c/o 4/10 incisional pain gave percocet 1 tab.

## 2019-01-16 ENCOUNTER — OFFICE VISIT (OUTPATIENT)
Dept: SURGERY | Age: 64
End: 2019-01-16

## 2019-01-16 VITALS
DIASTOLIC BLOOD PRESSURE: 70 MMHG | BODY MASS INDEX: 28.49 KG/M2 | HEIGHT: 68 IN | OXYGEN SATURATION: 95 % | RESPIRATION RATE: 16 BRPM | WEIGHT: 188 LBS | SYSTOLIC BLOOD PRESSURE: 138 MMHG | HEART RATE: 88 BPM

## 2019-01-16 DIAGNOSIS — Z90.49 STATUS POST LAPAROSCOPIC APPENDECTOMY: Primary | ICD-10-CM

## 2019-01-16 NOTE — PROGRESS NOTES
Progress Note    Patient: Jhony Rodriguez  MRN: [id number]  SSN: [id number]   YOB: 1955  Age: 59 y.o. Sex: female     Chief Complaint   Patient presents with    Post OP Follow Up     lap appendectomy       HPI    Ms. Elliot Davila returns for laparoscopic appendectomy. She looks good and her wounds are well-healed. She has no signs of thromboembolic phenomena. She is eating and moving her bowels without difficulty. Past Medical History:   Diagnosis Date    Anemia     Aortic mural thrombus ([address] 2014    on coumadin followed by dr Landon Ospina Aortic thrombus [facility]) 13    CAD (coronary artery disease)     Clot     Aortic clot    Clotting disorder (HCC)     Coronary atherosclerosis of native coronary artery     Stable, 18 mos. Since pce, will d/c effient    Diabetes mellitus     Gastrointestinal hemorrhage 2018    No clear etiology of GI bleed. Recent workup noted    Heart abnormalities     Hypercholesterolemia     Hypercoagulable state ([address] 2018    Maintain on Coumadin. INR followed by PCP.     Hyperlipidemia     Iron deficiency     MI (myocardial infarction) ([address]     Old myocardial infarction     Post RCA PCI    Other and unspecified hyperlipidemia     On multiple meds, pt had side effect of 28 differnet meds, currently on lipofen and livalo    Postsurgical percutaneous transluminal coronary angioplasty status     stable    Postsurgical percutaneous transluminal coronary angioplasty status     Raynaud's disease     Screening for depression     Splenic infarction 13    Ulcer      Past Surgical History:   Procedure Laterality Date    CARDIAC SURG PROCEDURE UNLIST      FLEXIBLE SIGMOIDOSCOPY N/A 2018    SIGMOIDOSCOPY FLEXIBLE performed by Max Flores MD at [address]  [facility]       delivery    HX COLONOSCOPY      HX CORONARY STENT PLACEMENT      RCA, VALDEZ    HX GYN      HX HEENT      HX HYSTERECTOMY      abdominal    HX ORTHOPAEDIC      right shoulder surgery    LAP,APPENDECTOMY N/A 01/01/2019    Dr. Fidel Denis     Allergies   Allergen Reactions    Latex Other (comments)     Blisters and bleeding from blisters.  Nitroglycerin Other (comments)     Patient states \"that when given medication last time she went into arrest\"    Morphine Shortness of Breath    Sulfa (Sulfonamide Antibiotics) Nausea Only    Tetanus Vaccines And Toxoid Swelling and Other (comments)     fever     Current Outpatient Medications   Medication Sig Dispense Refill    oxyCODONE-acetaminophen (PERCOCET) 5-325 mg per tablet Take 1 Tab by mouth every six (6) hours as needed for Pain. Max Daily Amount: 4 Tabs. 30 Tab 0    calcium polycarbophil (FIBERCON PO) Take  by mouth.  gemfibrozil (LOPID) 600 mg tablet Take 600 mg by mouth two (2) times a day.  glimepiride (AMARYL) 2 mg tablet Take  by mouth every morning.  torsemide (DEMADEX) 20 mg tablet Take  by mouth daily.  pioglitazone (ACTOS) 30 mg tablet Take  by mouth daily.  levomilnacipran (FETZIMA) 20 mg capsule Take 20 mg by mouth daily.  metoprolol tartrate (LOPRESSOR) 25 mg tablet Take 0.5 Tabs by mouth two (2) times a day. 90 Tab 3    co-enzyme Q-10 (CO Q-10) 100 mg capsule Take 100 mg by mouth daily.  senna-docusate (PERICOLACE) 8.6-50 mg per tablet Take 1 Tab by mouth daily.  biotin 2,500 mcg tab Take  by mouth.  pitavastatin (LIVALO) 4 mg tab tablet Take 4 mg by mouth daily.  traZODone (DESYREL) 100 mg tablet Take 100 mg by mouth nightly.  aspirin delayed-release 81 mg tablet Take  by mouth daily.  ferrous sulfate (IRON) 325 mg (65 mg iron) tablet Take  by mouth Daily (before breakfast).  magnesium 250 mg Tab Take  by mouth daily.  oxyCODONE-acetaminophen (PERCOCET)  mg per tablet Take 1 Tab by mouth as needed.       warfarin (COUMADIN) 7.5 mg tablet Take 7.5 mg by mouth once as needed (7.5 mg x 5 days then 5mg for 1 day then repeat).  metFORMIN (GLUCOPHAGE) 500 mg tablet Take  by mouth two (2) times daily (with meals).  amLODIPine (NORVASC) 2.5 mg tablet Take 2.5 mg by mouth daily.  esomeprazole (NEXIUM) 40 mg capsule Take 40 mg by mouth daily.  DULoxetine (CYMBALTA) 60 mg capsule Take 30 mg by mouth daily.  CALCIUM CARBONATE (CALCIUM 600) 1,500 (600) mg Tab Take 500 mg by mouth once.  cyanocobalamin (VITAMIN B-12) 2,000 mcg TbER Take 2,000 mcg by mouth once.  doxycycline hyclate (DORYX) 75 mg TbEC Take 100 mg by mouth daily.  omega-3 fatty acids-vitamin e (FISH OIL) 1,000 mg Cap Take 1 Cap by mouth daily.  cholecalciferol, vitamin d3, (VITAMIN D) 1,000 unit tablet Take  by mouth daily.        Social History     Socioeconomic History    Marital status:      Spouse name: Not on file    Number of children: Not on file    Years of education: Not on file    Highest education level: Not on file   Social Needs    Financial resource strain: Not on file    Food insecurity - worry: Not on file    Food insecurity - inability: Not on file    Transportation needs - medical: Not on file   [employer] needs - non-medical: Not on file   Occupational History    Not on file   Tobacco Use    Smoking status: Current Every Day Smoker     Packs/day: 2.50     Years: 40.00     Pack years: 100.00    Smokeless tobacco: Never Used    Tobacco comment: working on [address]; chewing gum and using patch   Substance and Sexual Activity    Alcohol use: No     Comment: very rare    Drug use: No    Sexual activity: Yes     Partners: Male     Birth control/protection: None   Other Topics Concern    Not on file   Social History Narrative    Not on file     Family History   Problem Relation Age of Onset    Hypertension Other         essential    Heart Attack Other         myocardial infarction    Other Other         Hypercholesterolemia         Review of systems:  Patient denies any reflux, emesis, abdominal pain, change in bowel habits, hematochezia, melena, fever, weight loss, fatigue chills, dermatitis, abnormal moles, change in vision, vertigo, epistaxis, dysphagia, hoarseness, chest pain, palpitations, hypertension, edema, cough, shortness of breath, wheezing, hemoptysis, snoring, hematuria, diabetes, thyroid disease, anemia, bruising, history of blood transfusion, dizziness, headache, or fainting. Physical Examination    Well developed well nourished female in no apparent distress  Visit Vitals  /70   Pulse 88   Resp 16   Ht 5' 8\" (1.727 m)   Wt 85.3 kg (188 lb)   SpO2 95%   BMI 28.59 kg/m²      Head: normocephalic, atraumatic  Mouth: Clear, no overt lesions, oral mucosa pink and moist  Neck: supple, no masses, no adenopathy or carotid bruits, trachea midline  Resp: clear to auscultation bilaterally, no wheeze, rhonchi or rales, excursions normal and symmetrical  Cardio: Regular rate and rhythm, no murmurs, clicks, gallops or rubs, no edema or varicosities  Abdomen: soft, nontender, nondistended, normoactive bowel sounds, no hernias, no hepatosplenomegaly, well-healed laparoscopic wounds  Back: Deferred  Extremeties: warm, well-perfused, no tenderness or swelling, normal gait/station  Neuro: sensation and strength grossly intact and symmetrical  Psych: alert and oriented to person, place and time  Breast exam deferred    IMPRESSION  Status post laparoscopic appendectomy doing well    PLAN  No orders of the defined types were placed in this encounter.     Follow-up as needed  Charis Jauregui MD

## 2019-03-31 ENCOUNTER — HOSPITAL ENCOUNTER (EMERGENCY)
Age: 64
Discharge: HOME | End: 2019-03-31
Attending: EMERGENCY MEDICINE
Payer: MEDICARE

## 2019-03-31 ENCOUNTER — APPOINTMENT (OUTPATIENT)
Dept: GENERAL RADIOLOGY | Age: 64
End: 2019-03-31
Attending: EMERGENCY MEDICINE
Payer: MEDICARE

## 2019-03-31 VITALS
TEMPERATURE: 97.9 F | HEART RATE: 70 BPM | SYSTOLIC BLOOD PRESSURE: 137 MMHG | OXYGEN SATURATION: 98 % | RESPIRATION RATE: 20 BRPM | DIASTOLIC BLOOD PRESSURE: 82 MMHG

## 2019-03-31 DIAGNOSIS — S93.401A SPRAIN OF RIGHT ANKLE, UNSPECIFIED LIGAMENT, INITIAL ENCOUNTER: Primary | ICD-10-CM

## 2019-03-31 PROCEDURE — 73610 X-RAY EXAM OF ANKLE: CPT

## 2019-03-31 PROCEDURE — 99283 EMERGENCY DEPT VISIT LOW MDM: CPT

## 2019-03-31 PROCEDURE — 74011250637 HC RX REV CODE- 250/637: Performed by: EMERGENCY MEDICINE

## 2019-03-31 RX ORDER — OXYCODONE AND ACETAMINOPHEN 5; 325 MG/1; MG/1
1 TABLET ORAL
Qty: 20 TAB | Refills: 0 | Status: SHIPPED | OUTPATIENT
Start: 2019-03-31 | End: 2019-04-07

## 2019-03-31 RX ORDER — OXYCODONE AND ACETAMINOPHEN 5; 325 MG/1; MG/1
1 TABLET ORAL
Status: COMPLETED | OUTPATIENT
Start: 2019-03-31 | End: 2019-03-31

## 2019-03-31 RX ADMIN — OXYCODONE AND ACETAMINOPHEN 1 TABLET: 5; 325 TABLET ORAL at 13:34

## 2019-03-31 NOTE — ED PROVIDER NOTES
EMERGENCY DEPARTMENT HISTORY AND PHYSICAL EXAM 
 
1:34 PM 
Date: 3/31/2019 Patient Name: Dee Alvarez History of Presenting Illness Chief Complaint: 
 
History Provided By:  
 
HPI: Dee Alvarez is a 59 y.o. female with past medical history of aortic mural thrombus on Coumadin, coronary disease, appendicitis, anemia, splenic infarction who presents with ankle pain. Patient states that she stood up and rolled her ankle developed sudden pain, actually throughout the pain was so severe. The pain is improved but is worse with movement and palpation and better with rest.  No proximal leg pain no foot pain. PCP: Ferny Flor MD 
 
This was created with voice recognition software and transcription errors may be present. Past History Past Medical History: 
Past Medical History:  
Diagnosis Date  Anemia  Aortic mural thrombus ([facility] 11/24/2014  
 on coumadin followed by dr Pia Tello  Aortic thrombus ([facility] 03/29/13  CAD (coronary artery disease)  Clot Aortic clot  Clotting disorder ([facility]  Coronary atherosclerosis of native coronary artery Stable, 18 mos. Since pce, will d/c effient  Diabetes mellitus  Gastrointestinal hemorrhage 6/25/2018 No clear etiology of GI bleed. Recent workup noted  Heart abnormalities  Hypercholesterolemia  Hypercoagulable state ([address] 6/25/2018 Maintain on Coumadin. INR followed by PCP.  Hyperlipidemia  Iron deficiency  MI (myocardial infarction) ([facility]  Old myocardial infarction Post RCA PCI  Other and unspecified hyperlipidemia On multiple meds, pt had side effect of 28 differnet meds, currently on lipofen and livalo  Postsurgical percutaneous transluminal coronary angioplasty status   
 stable  Postsurgical percutaneous transluminal coronary angioplasty status  Raynaud's disease  Screening for depression  Splenic infarction 03/29/13  Ulcer Past Surgical History: 
Past Surgical History:  
Procedure Laterality Date  CARDIAC SURG PROCEDURE UNLIST  [place] N/A 2018 SIGMOIDOSCOPY FLEXIBLE performed by Emperatriz Hernández MD at [address] [facility]    
  delivery  HX COLONOSCOPY    
 HX CORONARY STENT PLACEMENT    
 RCA, VALDEZ  
 HX GYN    
 HX HEENT    
 HX HYSTERECTOMY    
 abdominal  
 HX ORTHOPAEDIC    
 right shoulder surgery  LAP,APPENDECTOMY N/A 2019 Dr. Cl Hallman Family History: 
Family History Problem Relation Age of Onset  Hypertension Other   
     essential  
 Heart Attack Other   
     myocardial infarction  Other Other Hypercholesterolemia Social History: 
Social History Tobacco Use  Smoking status: Current Every Day Smoker Packs/day: 2.50 Years: 40.00 Pack years: 100.00  Smokeless tobacco: Never Used  Tobacco comment: working on quiting; chewing gum and using patch Substance Use Topics  Alcohol use: No  
  Comment: very rare  Drug use: No  
 
 
Allergies: Allergies Allergen Reactions  Latex Other (comments) Blisters and bleeding from blisters.  Nitroglycerin Other (comments) Patient states \"that when given medication last time she went into arrest\"  Morphine Shortness of Breath  Sulfa (Sulfonamide Antibiotics) Nausea Only  Tetanus Vaccines And Toxoid Swelling and Other (comments) fever Review of Systems Review of Systems Constitutional: Negative for chills and fatigue. Musculoskeletal: Positive for joint swelling. Neurological: Negative for numbness. All other systems reviewed and are negative. Physical Exam  
 
 
Physical Exam  
Constitutional: She is oriented to person, place, and time. She appears well-developed. HENT:  
Head: Normocephalic and atraumatic. Eyes: Pupils are equal, round, and reactive to light.  EOM are normal.  
 Neck: Normal range of motion. Neck supple. Cardiovascular: Normal rate, regular rhythm and normal heart sounds. Exam reveals no friction rub. No murmur heard. Pulmonary/Chest: Effort normal and breath sounds normal. No respiratory distress. She has no wheezes. Abdominal: Soft. She exhibits no distension. There is no tenderness. There is no rebound and no guarding. Musculoskeletal: Normal range of motion. Swelling to the right lateral malleolus distally, good DP pulse No laceration or abrasion Neurological: She is alert and oriented to person, place, and time. Skin: Skin is warm and dry. Psychiatric: She has a normal mood and affect. Her behavior is normal. Thought content normal.  
 
 
Diagnostic Study Results Vital Signs Visit Vitals /82 Pulse 70 Temp 97.9 °F (36.6 °C) Resp 20 SpO2 98% EKG: 
 
Labs:  
 
Imaging:  
xr neg for fx Medical Decision Making ED Course: Progress Notes, Reevaluation, and Consults: 
 
 
Provider Notes (Medical Decision Making): Patient with a ankle injury will obtain an x-ray suspicious for a sprain. She is on blood thinners she did fall and ever so slightly bump her head she is awake and alert we discussed a CT I do not think she needs it and she does not want it I think this is reasonable 1. Ankle sprain Diagnosis Clinical Impression: No diagnosis found. Disposition: 
 
Patient's Medications Start Taking No medications on file Continue Taking AMLODIPINE (NORVASC) 2.5 MG TABLET    Take 2.5 mg by mouth daily. ASPIRIN DELAYED-RELEASE 81 MG TABLET    Take  by mouth daily. BIOTIN 2,500 MCG TAB    Take  by mouth. CALCIUM CARBONATE (CALCIUM 600) 1,500 (600) MG TAB    Take 500 mg by mouth once. CALCIUM POLYCARBOPHIL (FIBERCON PO)    Take  by mouth. CHOLECALCIFEROL, VITAMIN D3, (VITAMIN D) 1,000 UNIT TABLET    Take  by mouth daily. CO-ENZYME Q-10 (CO Q-10) 100 MG CAPSULE    Take 100 mg by mouth daily. CYANOCOBALAMIN (VITAMIN B-12) 2,000 MCG TBER    Take 2,000 mcg by mouth once. DOXYCYCLINE HYCLATE (DORYX) 75 MG TBEC    Take 100 mg by mouth daily. DULOXETINE (CYMBALTA) 60 MG CAPSULE    Take 30 mg by mouth daily. ESOMEPRAZOLE (NEXIUM) 40 MG CAPSULE    Take 40 mg by mouth daily. FERROUS SULFATE (IRON) 325 MG (65 MG IRON) TABLET    Take  by mouth Daily (before breakfast). GEMFIBROZIL (LOPID) 600 MG TABLET    Take 600 mg by mouth two (2) times a day. GLIMEPIRIDE (AMARYL) 2 MG TABLET    Take  by mouth every morning. LEVOMILNACIPRAN (FETZIMA) 20 MG CAPSULE    Take 20 mg by mouth daily. MAGNESIUM 250 MG TAB    Take  by mouth daily. METFORMIN (GLUCOPHAGE) 500 MG TABLET    Take  by mouth two (2) times daily (with meals). METOPROLOL TARTRATE (LOPRESSOR) 25 MG TABLET    Take 0.5 Tabs by mouth two (2) times a day. OMEGA-3 FATTY ACIDS-VITAMIN E (FISH OIL) 1,000 MG CAP    Take 1 Cap by mouth daily. OXYCODONE-ACETAMINOPHEN (PERCOCET)  MG PER TABLET    Take 1 Tab by mouth as needed. OXYCODONE-ACETAMINOPHEN (PERCOCET) 5-325 MG PER TABLET    Take 1 Tab by mouth every six (6) hours as needed for Pain. Max Daily Amount: 4 Tabs. PIOGLITAZONE (ACTOS) 30 MG TABLET    Take  by mouth daily. PITAVASTATIN (LIVALO) 4 MG TAB TABLET    Take 4 mg by mouth daily. SENNA-DOCUSATE (PERICOLACE) 8.6-50 MG PER TABLET    Take 1 Tab by mouth daily. TORSEMIDE (DEMADEX) 20 MG TABLET    Take  by mouth daily. TRAZODONE (DESYREL) 100 MG TABLET    Take 100 mg by mouth nightly. WARFARIN (COUMADIN) 7.5 MG TABLET    Take 7.5 mg by mouth once as needed (7.5 mg x 5 days then 5mg for 1 day then repeat). These Medications have changed No medications on file Stop Taking No medications on file  
 
_______________________________ Attestations: 
Scribe Attestjong Galicia MD acting as a scribe for and in the presence of Karthik Sands MD     
March 31, 2019 at 1:34 PM 
    
 Provider Attestation:     
I personally performed the services described in the documentation, reviewed the documentation, as recorded by the scribe in my presence, and it accurately and completely records my words and actions. March 31, 2019 at 1:34 PM - Tru Felix MD   
_______________________________

## 2019-03-31 NOTE — DISCHARGE INSTRUCTIONS
Patient Education        Ankle Sprain: Care Instructions  Your Care Instructions    An ankle sprain can happen when you twist your ankle. The ligaments that support the ankle can get stretched and torn. Often the ankle is swollen and painful. Ankle sprains may take from several weeks to several months to heal. Usually, the more pain and swelling you have, the more severe your ankle sprain is and the longer it will take to heal. You can heal faster and regain strength in your ankle with good home treatment. It is very important to give your ankle time to heal completely, so that you do not easily hurt your ankle again. Follow-up care is a key part of your treatment and safety. Be sure to make and go to all appointments, and call your doctor if you are having problems. It's also a good idea to know your test results and keep a list of the medicines you take. How can you care for yourself at home? · Prop up your foot on pillows as much as possible for the next 3 days. Try to keep your ankle above the level of your heart. This will help reduce the swelling. · Follow your doctor's directions for wearing a splint or elastic bandage. Wrapping the ankle may help reduce or prevent swelling. · Your doctor may give you a splint, a brace, an air stirrup, or another form of ankle support to protect your ankle until it is healed. Wear it as directed while your ankle is healing. Do not remove it unless your doctor tells you to. After your ankle has healed, ask your doctor whether you should wear the brace when you exercise. · Put ice or cold packs on your injured ankle for 10 to 20 minutes at a time. Try to do this every 1 to 2 hours for the next 3 days (when you are awake) or until the swelling goes down. Put a thin cloth between the ice and your skin. · You may need to use crutches until you can walk without pain. If you do use crutches, try to bear some weight on your injured ankle if you can do so without pain.  This helps the ankle heal.  · Take pain medicines exactly as directed. ? If the doctor gave you a prescription medicine for pain, take it as prescribed. ? If you are not taking a prescription pain medicine, ask your doctor if you can take an over-the-counter medicine. · If you have been given ankle exercises to do at home, do them exactly as instructed. These can promote healing and help prevent lasting weakness. When should you call for help? Call your doctor now or seek immediate medical care if:    · Your pain is getting worse.     · Your swelling is getting worse.     · Your splint feels too tight or you are unable to loosen it.    Watch closely for changes in your health, and be sure to contact your doctor if:    · You are not getting better after 1 week. Where can you learn more? Go to http://bc-angelina.info/. Enter Q359 in the search box to learn more about \"Ankle Sprain: Care Instructions. \"  Current as of: September 20, 2018  Content Version: 11.9  © 8160-2391 [employer]. Care instructions adapted under license by [employer] (which disclaims liability or warranty for this information). If you have questions about a medical condition or this instruction, always ask your healthcare professional. [place] [address] any warranty or liability for your use of this information.

## 2019-03-31 NOTE — ED TRIAGE NOTES
Pt arrived to ED via EMS with CO R ankle pain S/P \"foot falling asleep\". Reports she twisted ankle then fell down and hit head on wood bed frame. Pt takes coumadin. A&O x3.

## 2019-05-29 ENCOUNTER — OFFICE VISIT (OUTPATIENT)
Dept: CARDIOLOGY CLINIC | Age: 64
End: 2019-05-29

## 2019-05-29 VITALS
DIASTOLIC BLOOD PRESSURE: 81 MMHG | BODY MASS INDEX: 29.31 KG/M2 | HEART RATE: 69 BPM | HEIGHT: 68 IN | WEIGHT: 193.4 LBS | SYSTOLIC BLOOD PRESSURE: 133 MMHG

## 2019-05-29 DIAGNOSIS — E78.5 HYPERLIPIDEMIA, UNSPECIFIED HYPERLIPIDEMIA TYPE: ICD-10-CM

## 2019-05-29 DIAGNOSIS — I25.10 ATHEROSCLEROSIS OF NATIVE CORONARY ARTERY OF NATIVE HEART WITHOUT ANGINA PECTORIS: Primary | ICD-10-CM

## 2019-05-29 DIAGNOSIS — I25.2 OLD MYOCARDIAL INFARCTION: ICD-10-CM

## 2019-05-29 DIAGNOSIS — Z98.61 POSTSURGICAL PERCUTANEOUS TRANSLUMINAL CORONARY ANGIOPLASTY STATUS: ICD-10-CM

## 2019-05-29 NOTE — PATIENT INSTRUCTIONS

## 2019-05-29 NOTE — PROGRESS NOTES
1. Have you been to the ER, urgent care clinic since your last visit? Hospitalized since your last visit? Yes When: 01/01/2019 Where: [facility] Reason for visit: appendicitis     2. Have you seen or consulted any other health care providers outside of the [address] [place] [address] since your last visit? Include any pap smears or colon screening.       No

## 2019-05-29 NOTE — PROGRESS NOTES
HISTORY OF PRESENT ILLNESS  Ariela Elena is a 59 y.o. female. Patient with cad,old mi,hypercoaguble state,old pci. On follow up patient denies any chest pains,sob, palpitation or other significant symptoms. 5/2019 - S/P Appendectomy 1/2019 - requiring transfusion of 3 units FFP    Cholesterol Problem   The history is provided by the patient. This is a chronic problem. The problem occurs constantly. Pertinent negatives include no chest pain, no abdominal pain, no headaches and no shortness of breath. Review of Systems   Constitutional: Negative for chills and fever. HENT: Negative for nosebleeds. Eyes: Negative for blurred vision and double vision. Respiratory: Negative for cough, hemoptysis, sputum production, shortness of breath and wheezing. Cardiovascular: Negative for chest pain, palpitations, orthopnea, claudication, leg swelling and PND. Gastrointestinal: Negative for abdominal pain, heartburn, nausea and vomiting. Musculoskeletal: Negative for myalgias. Skin: Negative for rash. Neurological: Negative for dizziness, weakness and headaches. Endo/Heme/Allergies: Does not bruise/bleed easily. Family History   Problem Relation Age of Onset    Hypertension Other         essential    Heart Attack Other         myocardial infarction    Other Other         Hypercholesterolemia       Past Medical History:   Diagnosis Date    Anemia     Aortic mural thrombus ([address] 11/24/2014    on coumadin followed by dr Sparrow Party Aortic thrombus [facility] 03/29/13    CAD (coronary artery disease)     Clot     Aortic clot    Clotting disorder ([address]     Coronary atherosclerosis of native coronary artery     Stable, 18 mos. Since pce, will d/c effient    Diabetes mellitus     Gastrointestinal hemorrhage 6/25/2018    No clear etiology of GI bleed. Recent workup noted    Heart abnormalities     Hypercholesterolemia     Hypercoagulable state ([address] 6/25/2018    Maintain on Coumadin.   INR followed by PCP.  Hyperlipidemia     Iron deficiency     [facility]     Old myocardial infarction     Post RCA PCI    Other and unspecified hyperlipidemia     On multiple meds, pt had side effect of 28 differnet meds, currently on lipofen and livalo    Postsurgical percutaneous transluminal coronary angioplasty status     stable    Postsurgical percutaneous transluminal coronary angioplasty status     Raynaud's disease     Screening for depression     Splenic infarction 13    Ulcer        Past Surgical History:   Procedure Laterality Date    CARDIAC SURG PROCEDURE UNLIST      FLEXIBLE SIGMOIDOSCOPY N/A 2018    SIGMOIDOSCOPY FLEXIBLE performed by Dea Galvan MD at [address]  [facility]       delivery    HX COLONOSCOPY      HX CORONARY STENT PLACEMENT      RCA, VALDEZ    HX GYN      HX HEENT      HX HYSTERECTOMY      abdominal    HX ORTHOPAEDIC      right shoulder surgery    LAP,APPENDECTOMY N/A 2019    Dr. Nain Sampson History     Tobacco Use    Smoking status: Current Every Day Smoker     Packs/day: 2.50     Years: 40.00     Pack years: 100.00    Smokeless tobacco: Never Used    Tobacco comment: working on quiting; chewing gum and using patch   Substance Use Topics    Alcohol use: No     Comment: very rare       Allergies   Allergen Reactions    Latex Other (comments)     Blisters and bleeding from blisters.  Nitroglycerin Other (comments)     Patient states \"that when given medication last time she went into arrest\"    Morphine Shortness of Breath    Sulfa (Sulfonamide Antibiotics) Nausea Only    Tetanus Vaccines And Toxoid Swelling and Other (comments)     fever       Current Outpatient Medications   Medication Sig    calcium polycarbophil (FIBERCON PO) Take  by mouth.  gemfibrozil (LOPID) 600 mg tablet Take 600 mg by mouth two (2) times a day.  glimepiride (AMARYL) 2 mg tablet Take  by mouth every morning.  torsemide (DEMADEX) 20 mg tablet Take  by mouth daily.  pioglitazone (ACTOS) 30 mg tablet Take  by mouth daily.  levomilnacipran (FETZIMA) 20 mg capsule Take 20 mg by mouth daily.  metoprolol tartrate (LOPRESSOR) 25 mg tablet Take 0.5 Tabs by mouth two (2) times a day.  co-enzyme Q-10 (CO Q-10) 100 mg capsule Take 100 mg by mouth daily.  senna-docusate (PERICOLACE) 8.6-50 mg per tablet Take 1 Tab by mouth daily.  biotin 2,500 mcg tab Take  by mouth.  pitavastatin (LIVALO) 4 mg tab tablet Take 4 mg by mouth daily.  traZODone (DESYREL) 100 mg tablet Take 100 mg by mouth nightly.  aspirin delayed-release 81 mg tablet Take  by mouth daily.  ferrous sulfate (IRON) 325 mg (65 mg iron) tablet Take  by mouth Daily (before breakfast).  magnesium 250 mg Tab Take  by mouth daily.  warfarin (COUMADIN) 7.5 mg tablet Take 7.5 mg by mouth once as needed (7.5 mg x 5 days then 5mg for 1 day then repeat).  metFORMIN (GLUCOPHAGE) 500 mg tablet Take  by mouth two (2) times daily (with meals).  amLODIPine (NORVASC) 2.5 mg tablet Take 2.5 mg by mouth daily.  esomeprazole (NEXIUM) 40 mg capsule Take 40 mg by mouth daily.  DULoxetine (CYMBALTA) 60 mg capsule Take 30 mg by mouth daily.  CALCIUM CARBONATE (CALCIUM 600) 1,500 (600) mg Tab Take 500 mg by mouth once.  cyanocobalamin (VITAMIN B-12) 2,000 mcg TbER Take 2,000 mcg by mouth once.  doxycycline hyclate (DORYX) 75 mg TbEC Take 100 mg by mouth daily.  omega-3 fatty acids-vitamin e (FISH OIL) 1,000 mg Cap Take 1 Cap by mouth daily.  cholecalciferol, vitamin d3, (VITAMIN D) 1,000 unit tablet Take  by mouth daily. No current facility-administered medications for this visit. Visit Vitals  Ht 5' 8\" (1.727 m)   Wt 87.7 kg (193 lb 6.4 oz)   BMI 29.41 kg/m²         Physical Exam   Constitutional: She is oriented to person, place, and time. She appears well-developed and well-nourished.    HENT:   Head: Normocephalic and atraumatic. Eyes: Conjunctivae are normal.   Neck: Neck supple. No JVD present. No tracheal deviation present. No thyromegaly present. Cardiovascular: Normal rate and regular rhythm. PMI is not displaced. Exam reveals no gallop and no decreased pulses. Murmur heard. Early systolic murmur is present at the upper right sternal border. Pulmonary/Chest: No respiratory distress. She has no wheezes. She has no rales. She exhibits no tenderness. Abdominal: Soft. There is no tenderness. Musculoskeletal: She exhibits no edema. Neurological: She is alert and oriented to person, place, and time. Skin: Skin is warm. Psychiatric: She has a normal mood and affect. Ms. Ricardo Bowie has a reminder for a \"due or due soon\" health maintenance. I have asked that she contact her primary care provider for follow-up on this health maintenance. No flowsheet data found. Impression:cta-2014    Small amount of residual thrombus remains in the left lateral wall of the distal thoracic aorta.  The large luminal filling defect is no longer evident.  No new filling defects. Ulcerated plaque at the abdominal aorta at the level of the JOLLY posteriorly also stable in appearance. Mesenteric narrowings as above similar to prior exam.    Resolving splenic infarct. No recurrence of pancreatitis. Hepatic steatosis. Splenic and renal infarcts as seen previously. Interpretation Summary 9/2018  · Calculated left ventricular ejection fraction is 55%. Normal left ventricular wall motion, no regional wall motion abnormality noted. Mild (grade 1) left ventricular diastolic dysfunction. · Trace mitral valve regurgitation. · Trivial pericardial effusion. Effusion is is fibrinous. Procedure Conclusion 9/2018  Nuclear Stress Test   Negative myocardial perfusion imaging. Myocardial perfusion imaging supports a low risk stress test.   There is a prior study available for comparison.  As compared to the previous study, there are no significant changes. Normal EF:% 57      Interpretation Summary   · Gated SPECT: Left ventricular function post-stress was normal. Calculated ejection fraction is 61%. · Baseline ECG: Normal sinus rhythm. · Negative stress electrocardiogram.  · Left ventricular perfusion is normal.  · Negative myocardial perfusion imaging. Myocardial perfusion imaging supports a low risk stress test.       Assessment       ICD-10-CM ICD-9-CM    1. Atherosclerosis of native coronary artery of native heart without angina pectoris I25.10 414.01     stable  negative stress test  monitor   2. Old myocardial infarction I25.2 412     Stable   3. Hyperlipidemia, unspecified hyperlipidemia type E78.5 272.4     Continue statin   4. Postsurgical percutaneous transluminal coronary angioplasty status Z98.61 V45.82     Stable     10/2018  History of aortic mural thrombus on Coumadin followed by PCP. Recent concern on GI bleed being evaluated by Dr. Claribel Boss. Cardiac status remains stable    5/2019 - Cardiac status stable. Appendicitis in January, recovering well. Has stopped smoking! Continue Warfarin for history of mural thrombus - INR followed by PCP. There are no discontinued medications. No orders of the defined types were placed in this encounter.

## 2019-07-25 ENCOUNTER — APPOINTMENT (OUTPATIENT)
Dept: GENERAL RADIOLOGY | Age: 64
End: 2019-07-25
Attending: PHYSICIAN ASSISTANT
Payer: MEDICARE

## 2019-07-25 ENCOUNTER — HOSPITAL ENCOUNTER (EMERGENCY)
Age: 64
Discharge: HOME | End: 2019-07-25
Attending: EMERGENCY MEDICINE
Payer: MEDICARE

## 2019-07-25 ENCOUNTER — APPOINTMENT (OUTPATIENT)
Dept: CT IMAGING | Age: 64
End: 2019-07-25
Attending: PHYSICIAN ASSISTANT
Payer: MEDICARE

## 2019-07-25 VITALS
BODY MASS INDEX: 28.64 KG/M2 | WEIGHT: 189 LBS | HEIGHT: 68 IN | TEMPERATURE: 97.7 F | HEART RATE: 82 BPM | DIASTOLIC BLOOD PRESSURE: 87 MMHG | SYSTOLIC BLOOD PRESSURE: 138 MMHG | OXYGEN SATURATION: 100 % | RESPIRATION RATE: 14 BRPM

## 2019-07-25 DIAGNOSIS — M54.2 NECK PAIN: ICD-10-CM

## 2019-07-25 DIAGNOSIS — S83.91XA SPRAIN OF RIGHT KNEE, UNSPECIFIED LIGAMENT, INITIAL ENCOUNTER: ICD-10-CM

## 2019-07-25 DIAGNOSIS — S93.601A FOOT SPRAIN, RIGHT, INITIAL ENCOUNTER: ICD-10-CM

## 2019-07-25 DIAGNOSIS — S82.61XA CLOSED AVULSION FRACTURE OF LATERAL MALLEOLUS OF RIGHT FIBULA, INITIAL ENCOUNTER: Primary | ICD-10-CM
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
## 2019-07-25 PROCEDURE — L4350 ANKLE CONTROL ORTHO PRE OTS: HCPCS

## 2019-07-25 PROCEDURE — 70450 CT HEAD/BRAIN W/O DYE: CPT

## 2019-07-25 PROCEDURE — 73562 X-RAY EXAM OF KNEE 3: CPT

## 2019-07-25 PROCEDURE — 99283 EMERGENCY DEPT VISIT LOW MDM: CPT

## 2019-07-25 PROCEDURE — 72125 CT NECK SPINE W/O DYE: CPT

## 2019-07-25 PROCEDURE — 73610 X-RAY EXAM OF ANKLE: CPT

## 2019-07-25 PROCEDURE — 73630 X-RAY EXAM OF FOOT: CPT

## 2019-07-25 RX ORDER — HYDROCODONE BITARTRATE AND ACETAMINOPHEN 5; 325 MG/1; MG/1
1 TABLET ORAL
Qty: 12 TAB | Refills: 0 | Status: SHIPPED | OUTPATIENT
Start: 2019-07-25 | End: 2019-07-29

## 2019-07-25 RX ORDER — CYCLOBENZAPRINE HCL 10 MG
10 TABLET ORAL
Qty: 12 TAB | Refills: 0 | Status: SHIPPED | OUTPATIENT
Start: 2019-07-25 | End: 2021-08-23

## 2019-07-25 NOTE — ED NOTES
I have reviewed discharge instructions with the patient. The patient verbalized understanding. Assisted patient to the ED Lobby exit via wheelchair per ED RN. No acute distress noted.

## 2019-07-25 NOTE — DISCHARGE INSTRUCTIONS
Patient Education     Please return immediately to the Emergency Room for re-evaluation if you are not improving, develop any new symptoms, or develop worsening of current symptoms! If you have been prescribed a medication and are unable to take this medication for any reason, please return to the Emergency Department for further evaluation! If you have been referred for follow-up to a specialist, but are unable to follow-up and your symptoms are either not improving or are worsening, please return to the Emergency Department for further evaluation! Broken Ankle: Care Instructions  Your Care Instructions    An ankle may break (fracture) during sports, a fall, or other accidents. Fractures can range from a small, hairline crack, to a bone or bones broken into two or more pieces. Your treatment depends on how bad the break is. Your doctor may have put your ankle in a splint or cast to allow it to heal or to keep it stable until you see another doctor. It may take weeks or months for your ankle to heal. You can help your ankle heal with some care at home. You heal best when you take good care of yourself. Eat a variety of healthy foods, and don't smoke. You may have had a sedative to help you relax. You may be unsteady after having sedation. It can take a few hours for the medicine's effects to wear off. Common side effects of sedation include nausea, vomiting, and feeling sleepy or tired. The doctor has checked you carefully, but problems can develop later. If you notice any problems or new symptoms,  get medical treatment right away. Follow-up care is a key part of your treatment and safety. Be sure to make and go to all appointments, and call your doctor if you are having problems. It's also a good idea to know your test results and keep a list of the medicines you take. How can you care for yourself at home? · If the doctor gave you a sedative:  ?  For 24 hours, don't do anything that requires attention to detail, such as going to work, making important decisions, or signing any legal documents. It takes time for the medicine's effects to completely wear off.  ? For your safety, do not drive or operate any machinery that could be dangerous. Wait until the medicine wears off and you can think clearly and react easily. · Put ice or a cold pack on your ankle for 10 to 20 minutes at a time. Try to do this every 1 to 2 hours for the next 3 days (when you are awake). Put a thin cloth between the ice and your cast or splint. Keep your cast or splint dry. · Follow the cast care instructions your doctor gives you. If you have a splint, do not take it off unless your doctor tells you to. · Be safe with medicines. Take pain medicines exactly as directed. ? If the doctor gave you a prescription medicine for pain, take it as prescribed. ? If you are not taking a prescription pain medicine, ask your doctor if you can take an over-the-counter medicine. · Prop up your leg on pillows in the first few days after the injury. Keep the ankle higher than the level of your heart. This will help reduce swelling. · Do not put weight on your ankle unless your doctor tells you to. Use crutches to walk. · Follow instructions for exercises to keep your leg strong. · Wiggle your toes often to reduce swelling and stiffness. When should you call for help? Call 911 anytime you think you may need emergency care.  For example, call if:    · You have chest pain, are short of breath, or you cough up blood.     · You are very sleepy and you have trouble waking up.    Call your doctor now or seek immediate medical care if:    · You have new or worse nausea or vomiting.     · You have new or worse pain.     · Your foot is cool or pale or changes color.     · You have tingling, weakness, or numbness in your toes.     · Your cast or splint feels too tight.     · You have signs of a blood clot in your leg (called a deep vein thrombosis), such as:  ? Pain in your calf, back of the knee, thigh, or groin. ? Redness or swelling in your leg.    Watch closely for changes in your health, and be sure to contact your doctor if:    · You have a problem with your splint or cast.     · You do not get better as expected. Where can you learn more? Go to http://bc-angelina.info/. Enter P763 in the search box to learn more about \"Broken Ankle: Care Instructions. \"  Current as of: September 20, 2018  Content Version: 12.1  © 2283-9343 [employer]. Care instructions adapted under license by [employer] (which disclaims liability or warranty for this information). If you have questions about a medical condition or this instruction, always ask your healthcare professional. [address] any warranty or liability for your use of this information. Patient Education        Preventing Falls: Care Instructions  Your Care Instructions    Getting around your home safely can be a challenge if you have injuries or health problems that make it easy for you to fall. Loose rugs and furniture in walkways are among the dangers for many older people who have problems walking or who have poor eyesight. People who have conditions such as arthritis, osteoporosis, or dementia also have to be careful not to fall. You can make your home safer with a few simple measures. Follow-up care is a key part of your treatment and safety. Be sure to make and go to all appointments, and call your doctor if you are having problems. It's also a good idea to know your test results and keep a list of the medicines you take. How can you care for yourself at home? Taking care of yourself  · You may get dizzy if you do not drink enough water. To prevent dehydration, drink plenty of fluids, enough so that your urine is light yellow or clear like water. Choose water and other caffeine-free clear liquids.  If you have kidney, heart, or liver disease and have to limit fluids, talk with your doctor before you increase the amount of fluids you drink. · Exercise regularly to improve your strength, muscle tone, and balance. Walk if you can. Swimming may be a good choice if you cannot walk easily. · Have your vision and hearing checked each year or any time you notice a change. If you have trouble seeing and hearing, you might not be able to avoid objects and could lose your balance. · Know the side effects of the medicines you take. Ask your doctor or pharmacist whether the medicines you take can affect your balance. Sleeping pills or sedatives can affect your balance. · Limit the amount of alcohol you drink. Alcohol can impair your balance and other senses. · Ask your doctor whether calluses or corns on your feet need to be removed. If you wear loose-fitting shoes because of calluses or corns, you can lose your balance and fall. · Talk to your doctor if you have numbness in your feet. Preventing falls at home  · Remove raised doorway thresholds, throw rugs, and clutter. Repair loose carpet or raised areas in the floor. · Move furniture and electrical cords to keep them out of walking paths. · Use nonskid floor wax, and wipe up spills right away, especially on ceramic tile floors. · If you use a walker or cane, put rubber tips on it. If you use crutches, clean the bottoms of them regularly with an abrasive pad, such as steel wool. · Keep your house well lit, especially [employer], and outside walkways. Use night-lights in areas such as hallways and bathrooms. Add extra light switches or use remote switches (such as switches that go on or off when you clap your hands) to make it easier to turn lights on if you have to get up during the night. · Install sturdy handrails on stairways. · Move items in your cabinets so that the things you use a lot are on the lower shelves (about waist level).   · Keep a cordless phone and a flashlight with new batteries by your bed. If possible, put a phone in each of the main rooms of your house, or carry a cell phone in case you fall and cannot reach a phone. Or, you can wear a device around your neck or wrist. You push a button that sends a signal for help. · Wear low-heeled shoes that fit well and give your feet good support. Use footwear with nonskid soles. Check the heels and soles of your shoes for wear. Repair or replace worn heels or soles. · Do not wear socks without shoes on wood floors. · Walk on the grass when the sidewalks are slippery. If you live in an area that gets snow and ice in the winter, sprinkle salt on slippery steps and sidewalks. Preventing falls in the bath  · Install grab bars and nonskid mats inside and outside your shower or tub and near the toilet and sinks. · Use shower chairs and bath benches. · Use a hand-held shower head that will allow you to sit while showering. · Get into a tub or shower by putting the weaker leg in first. Get out of a tub or shower with your strong side first.  · Repair loose toilet seats and consider installing a raised toilet seat to make getting on and off the toilet easier. · Keep your bathroom door unlocked while you are in the shower. Where can you learn more? Go to http://bc-angelina.info/. Enter [id number] [id number] in the search box to learn more about \"Preventing Falls: Care Instructions. \"  Current as of: November 7, 2018  Content Version: 12.1  © 4124-6950 [employer]. Care instructions adapted under license by [employer] (which disclaims liability or warranty for this information). If you have questions about a medical condition or this instruction, always ask your healthcare professional. [place] [address] any warranty or liability for your use of this information.          Patient Education        Foot Sprain: Care Instructions  Your Care Instructions    A foot sprain occurs when you stretch or tear the ligaments around your foot. Ligaments are the tough tissues that connect one bone to another. A sprain can happen when you run, fall, or hit your toe against something. Sprains often happen when you jump or change direction quickly. This may occur when you play basketball, soccer, or other sports. Most foot sprains will get better with treatment at home. Follow-up care is a key part of your treatment and safety. Be sure to make and go to all appointments, and call your doctor if you are having problems. It's also a good idea to know your test results and keep a list of the medicines you take. How can you care for yourself at home? · Walk or put weight on your sprained foot as long as it does not hurt. · If your doctor gave you a splint or immobilizer, wear it as directed. If you were given crutches, use them as directed. · For the first 2 days after your injury, avoid hot showers, hot tubs, or hot packs. They may increase swelling. · Put ice or a cold pack on your foot for 10 to 20 minutes at a time to stop swelling. Try this every 1 to 2 hours for 3 days (when you are awake) or until the swelling goes down. Put a thin cloth between the ice pack and your skin. Keep your splint dry. · After 2 or 3 days, if your swelling is gone, put a heating pad (set on low) or a warm cloth on your foot. Some doctors suggest that you go back and forth between hot and cold treatments. · Prop up your foot on a pillow when you ice it or anytime you sit or lie down. Try to keep it above the level of your heart. This will help reduce swelling. · Take pain medicines exactly as directed. ? If the doctor gave you a prescription medicine for pain, take it as prescribed. ? If you are not taking a prescription pain medicine, ask your doctor if you can take an over-the-counter medicine. · Do any exercises that your doctor or physical therapist suggests.   · Return to your usual exercise gradually as you feel better. When should you call for help? Call your doctor now or seek immediate medical care if:    · You have increased or severe pain.     · Your toes are cool or pale or change color.     · Your wrap or splint feels too tight.     · You have signs of a blood clot, such as:  ? Pain in your calf, back of the knee, thigh, or groin. ? Redness and swelling in your leg or groin.     · You have tingling, weakness, or numbness in your leg or foot.    Watch closely for changes in your health, and be sure to contact your doctor if:    · You cannot put any weight on your foot.     · You get a fever.     · You do not get better as expected. Where can you learn more? Go to http://bc-angelina.info/. Enter W869 in the search box to learn more about \"Foot Sprain: Care Instructions. \"  Current as of: September 20, 2018  Content Version: 12.1  © 5938-3709 [employer]. Care instructions adapted under license by [employer] (which disclaims liability or warranty for this information). If you have questions about a medical condition or this instruction, always ask your healthcare professional. [address] any warranty or liability for your use of this information.

## 2019-07-25 NOTE — ED PROVIDER NOTES
EMERGENCY DEPARTMENT HISTORY AND PHYSICAL EXAM    4:15 PM      Date: 7/25/2019  Patient Name: David Bella    History of Presenting Illness     Chief Complaint   Patient presents with    Foot Pain     R foot, ankle and knee     Fall       History Provided By: Patient    Chief Complaint: right ankle pain, right knee pain, neck pain, fall  Duration: 1 Days  Timing:  Acute  Location:   Quality: Aching  Severity: Moderate  Modifying Factors: none  Associated Symptoms: denies any other associated signs or symptoms      Additional History (Context):Dora Ogden is a 59 y.o. female with a pertinent history of hypercoagulable state on AC with coumadin, anemia, aortic thrombus, DM, CAD, HLD, MI, and Raynaud's who presents to the emergency department for evaluation after a fall which occurred yesterday. Pt states she has new corrective lenses and missed a step, causing her to trip and fall. She states that she fell forward, bent her right foot underneath her ankle, and landed on her right knee, and hit her head on the ground. She states it was a very minor head injury and she did not lose consciousness and has no evidence of trauma on her forehead. However, she does take Coumadin. Patient is also complaining of bilateral posterior neck pain since the fall. She states she feels as if she has whiplash. No chest pain, shortness of breath, new focal weakness or numbness, or any other complaints. PCP:  Dorothy Benitez MD      Current Outpatient Medications   Medication Sig Dispense Refill    HYDROcodone-acetaminophen (NORCO) 5-325 mg per tablet Take 1 Tab by mouth every eight (8) hours as needed for Pain for up to 4 days. Max Daily Amount: 3 Tabs. 12 Tab 0    cyclobenzaprine (FLEXERIL) 10 mg tablet Take 1 Tab by mouth three (3) times daily as needed for Muscle Spasm(s). 12 Tab 0    calcium polycarbophil (FIBERCON PO) Take  by mouth.       gemfibrozil (LOPID) 600 mg tablet Take 600 mg by mouth two (2) times a day.      glimepiride (AMARYL) 2 mg tablet Take  by mouth every morning.  torsemide (DEMADEX) 20 mg tablet Take  by mouth daily.  pioglitazone (ACTOS) 30 mg tablet Take  by mouth daily.  levomilnacipran (FETZIMA) 20 mg capsule Take 20 mg by mouth daily.  metoprolol tartrate (LOPRESSOR) 25 mg tablet Take 0.5 Tabs by mouth two (2) times a day. 90 Tab 3    co-enzyme Q-10 (CO Q-10) 100 mg capsule Take 100 mg by mouth daily.  senna-docusate (PERICOLACE) 8.6-50 mg per tablet Take 1 Tab by mouth daily.  biotin 2,500 mcg tab Take  by mouth.  pitavastatin (LIVALO) 4 mg tab tablet Take 4 mg by mouth daily.  traZODone (DESYREL) 100 mg tablet Take 100 mg by mouth nightly.  aspirin delayed-release 81 mg tablet Take  by mouth daily.  ferrous sulfate (IRON) 325 mg (65 mg iron) tablet Take  by mouth Daily (before breakfast).  magnesium 250 mg Tab Take  by mouth daily.  warfarin (COUMADIN) 7.5 mg tablet Take 7.5 mg by mouth once as needed (7.5 mg x 5 days then 5mg for 1 day then repeat).  metFORMIN (GLUCOPHAGE) 500 mg tablet Take  by mouth two (2) times daily (with meals).  amLODIPine (NORVASC) 2.5 mg tablet Take 2.5 mg by mouth daily.  esomeprazole (NEXIUM) 40 mg capsule Take 40 mg by mouth daily.  DULoxetine (CYMBALTA) 60 mg capsule Take 30 mg by mouth daily.  CALCIUM CARBONATE (CALCIUM 600) 1,500 (600) mg Tab Take 500 mg by mouth once.  cyanocobalamin (VITAMIN B-12) 2,000 mcg TbER Take 2,000 mcg by mouth once.  doxycycline hyclate (DORYX) 75 mg TbEC Take 100 mg by mouth daily.  omega-3 fatty acids-vitamin e (FISH OIL) 1,000 mg Cap Take 1 Cap by mouth daily.  cholecalciferol, vitamin d3, (VITAMIN D) 1,000 unit tablet Take  by mouth daily.          Past History     Past Medical History:  Past Medical History:   Diagnosis Date    Anemia     Aortic mural thrombus ([facility] 11/24/2014    on coumadin followed by dr Joana Sharif Aortic thrombus ([facility] 13    CAD (coronary artery disease)     Clot     Aortic clot    Clotting disorder (HCC)     Coronary atherosclerosis of native coronary artery     Stable, 18 mos. Since pce, will d/c effient    Diabetes mellitus     Gastrointestinal hemorrhage 2018    No clear etiology of GI bleed. Recent workup noted    Heart abnormalities     Hypercholesterolemia     Hypercoagulable state ([facility] 2018    Maintain on Coumadin. INR followed by PCP.     Hyperlipidemia     Iron deficiency     MI (myocardial infarction) ([facility]     Old myocardial infarction     Post RCA PCI    Other and unspecified hyperlipidemia     On multiple meds, pt had side effect of 28 differnet meds, currently on lipofen and livalo    Postsurgical percutaneous transluminal coronary angioplasty status     stable    Postsurgical percutaneous transluminal coronary angioplasty status     Raynaud's disease     Screening for depression     Splenic infarction 13    Ulcer        Past Surgical History:  Past Surgical History:   Procedure Laterality Date    CARDIAC SURG PROCEDURE UNLIST      FLEXIBLE SIGMOIDOSCOPY N/A 2018    SIGMOIDOSCOPY FLEXIBLE performed by Mickie Luo MD at [address]  [address]       delivery    HX COLONOSCOPY      HX CORONARY STENT PLACEMENT      RCA, VALDEZ    HX GYN      HX HEENT      HX HYSTERECTOMY      abdominal    HX ORTHOPAEDIC      right shoulder surgery    LAP,APPENDECTOMY N/A 2019    Dr. Tejal Aguiar       Family History:  Family History   Problem Relation Age of Onset    Hypertension Other         essential    Heart Attack Other         myocardial infarction    Other Other         Hypercholesterolemia       Social History:  Social History     Tobacco Use    Smoking status: Former Smoker     Packs/day: 2.50     Years: 40.00     Pack years: 100.00     Last attempt to quit: 2019     Years since quittin.5    Smokeless tobacco: Never Used    Tobacco comment: working on [employer]; chewing gum and using patch   Substance Use Topics    Alcohol use: No     Comment: very rare    Drug use: No       Allergies: Allergies   Allergen Reactions    Latex Other (comments)     Blisters and bleeding from blisters.  Nitroglycerin Other (comments)     Patient states \"that when given medication last time she went into arrest\"    Morphine Shortness of Breath    Sulfa (Sulfonamide Antibiotics) Nausea Only    Tetanus Vaccines And Toxoid Swelling and Other (comments)     fever         Review of Systems       Review of Systems   Constitutional: Negative for chills and fever. HENT: Negative for congestion, rhinorrhea and sore throat. Respiratory: Negative for cough and shortness of breath. Cardiovascular: Negative for chest pain. Gastrointestinal: Negative for abdominal pain, blood in stool, constipation, diarrhea, nausea and vomiting. Genitourinary: Negative for dysuria, frequency and hematuria. Musculoskeletal: Positive for arthralgias and joint swelling. Negative for back pain, gait problem and myalgias. Skin: Negative for rash and wound. Neurological: Negative for dizziness, syncope, weakness, numbness and headaches. All other systems reviewed and are negative. Physical Exam     Visit Vitals  /75 (BP 1 Location: Left arm, BP Patient Position: Sitting)   Pulse 92   Temp 97.1 °F (36.2 °C)   Resp 16   Ht 5' 8\" (1.727 m)   Wt 85.7 kg (189 lb)   SpO2 96%   BMI 28.74 kg/m²       Physical Exam   Constitutional: She is oriented to person, place, and time. She appears well-developed and well-nourished. No distress. HENT:   Head: Normocephalic and atraumatic. Atraumatic exam of head and face   Eyes: Conjunctivae are normal.   Neck: Normal range of motion. Neck supple. Cardiovascular: Normal rate, regular rhythm and normal heart sounds. Pulmonary/Chest: Effort normal and breath sounds normal. No respiratory distress.  She exhibits no tenderness. Musculoskeletal: She exhibits edema and tenderness. She exhibits no deformity. Pt is TTP right anterior ankle with mild amount of edema and erythema. 2+ DP and PT pulses RLE. Pt is unable to straighten her RLE at the knee. Exam is technically difficult secondary to pain. Very small abrasion noted to right anterior knee. Pt is TTP cervical paraspinal muscles. No deformity, step off, midline tenderness, or abnormal findings on skin exam.      Pt moving BUE in NAD, but is using a wheelchair due to pain in RLE. Neurological: She is alert and oriented to person, place, and time. She has normal reflexes. No cranial nerve deficit. Skin: Skin is warm and dry. She is not diaphoretic. Psychiatric: She has a normal mood and affect. Nursing note and vitals reviewed. Diagnostic Study Results     Labs -  No results found for this or any previous visit (from the past 12 hour(s)). Radiologic Studies -   Xr Ankle Rt Min 3 V    Result Date: 7/25/2019  EXAM: XR ANKLE RT MIN 3 V, XR FOOT RT MIN 3 V INDICATION: Pain COMPARISON: 10/11/2010. FINDINGS: Three views of the right ankle and 3 views of the right foot There is a spur or acute avulsion fracture at the tip of the lateral malleolus. Joint spaces are preserved. The mortise is intact and the talar dome is smooth. Nondisplaced fracture at the base of the fifth metatarsal with intra-articular extension but somewhat corticated margins and partially healing indicating. Prominent bunion and mild hallux valgus. . Mild lateral ankle soft tissue swelling. IMPRESSION: Spur or acute avulsion fracture at the tip of the lateral malleolus, overlying soft tissue swelling. Nonacute nondisplaced fracture at the base of the fifth metatarsal with intra-articular extension. Xr Foot Rt Min 3 V    Result Date: 7/25/2019  EXAM: XR ANKLE RT MIN 3 V, XR FOOT RT MIN 3 V INDICATION: Pain COMPARISON: 10/11/2010.  FINDINGS: Three views of the right ankle and 3 views of the right foot There is a spur or acute avulsion fracture at the tip of the lateral malleolus. Joint spaces are preserved. The mortise is intact and the talar dome is smooth. Nondisplaced fracture at the base of the fifth metatarsal with intra-articular extension but somewhat corticated margins and partially healing indicating. Prominent bunion and mild hallux valgus. . Mild lateral ankle soft tissue swelling. IMPRESSION: Spur or acute avulsion fracture at the tip of the lateral malleolus, overlying soft tissue swelling. Nonacute nondisplaced fracture at the base of the fifth metatarsal with intra-articular extension. Ct Head Wo Cont    Result Date: 7/25/2019  EXAM: CT BRAIN  CPT code: 33715  CLINICAL INDICATION/HISTORY: Patient fell today with head trauma. COMPARISON: 2 March 2010. TECHNIQUE: Contiguous noncontrast axial images of the brain are obtained from the foramen magnum to the vertex and reviewed in brain and bone windows. FINDINGS: There are no focal parenchymal defects and there is no evidence of mass, mass effect or intracranial hemorrhage. The cerebrospinal fluid spaces are normal for age. No significant extra-axial abnormalities are seen. The pituitary fossa is unremarkable. The mastoids and visualized paranasal sinuses are clear. The visualized orbits look grossly normal.  No significant bony abnormalities are seen. There is no significant interval change. IMPRESSION: The brain looks normal. =================== Note: [facility] maintains that all CT scans at their facilities are performed by using dose optimization technique as appropriate to a performed examination, to include automated exposure control, adjustment of the mAs and/or kVp according to patient size (including appropriate matching for site specific examinations) or use of iterative reconstruction technique.      Ct Spine Cerv Wo Cont    Result Date: 7/25/2019  EXAM: CT CERVICAL SPINE  CPT code: 55648 CLINICAL INDICATION/HISTORY: Patient fell today: Cannot move neck. History of prior neck fractures, unspecified. COMPARISON: None. TECHNIQUE: Helical axial non-contrast images of the cervical spine are obtained from the base of the skull through the thoracic inlet and reviewed in soft tissue, lung and bone windows, as appropriate. Additional sagittal and coronal reconstructions were obtained to better delineate vertebral body alignment, intervertebral disc spaces and facet joints which are not well seen in the axial imaging plane. FINDINGS: There is grossly normal vertebral body alignment. There is no evidence of fracture or other significant bony abnormality. Moderate narrowing at C4-5 and mild narrowing at C3-4 and C5-6. Other disc spaces are preserved. The craniocervical junction is intact. There is no significant prevertebral soft tissue thickening. C2-3: Canal and right foramen are patent. There is mild-to-moderate left foraminal stenosis from facet arthropathy. C3-4: Canal and foramina are patent. There is apparent ankylosis of the right facet joint and block fusion of the left facet joint. C4-5: Central canal is patent. There is mild right and moderately severe left foraminal stenosis from uncinate hypertrophy. C5-6: Canal and right foramen are patent. There is mild-to-moderate left foraminal stenosis from significant facet arthropathy. C6-7 Canal and foramina are patent. C7-T1: Normal.     IMPRESSION: There is no fracture or significant bony abnormality. Mild right and moderately severe left foraminal stenosis at C4-5 from facet arthropathy. Canal and right foramen are patent. There is mild-to-moderate left foraminal stenosis from facet arthropathy at C2-3. Canal and right foramen are patent. There is mild-to-moderate left foraminal stenosis from facet arthropathy at C5-6. Canal and right foramen are patent.  There is apparent ankylosis of the right facet joint and block fusion of the left facet joint at C3-4. Canal and foramina are patent. Canal and foramina otherwise patent at the remaining levels. =================== Note: Jan[facility] Jessie Bobby maintains that all CT scans at their facilities are performed by using dose optimization technique as appropriate to a performed examination, to include automated exposure control, adjustment of the mAs and/or kVp according to patient size (including appropriate matching for site specific examinations) or use of iterative reconstruction technique. Xr Knee Rt 3 V    Result Date: 7/25/2019  EXAM:  XR KNEE RT 3 V INDICATION:   pain COMPARISON: None. FINDINGS: Three views of the right knee demonstrate no fracture or dislocation. Joint spaces are preserved. Quadriceps tendon enthesophyte. No significant joint effusion or soft tissue swelling. IMPRESSION: No acute osseous abnormality. Medical Decision Making   I am the first provider for this patient. I reviewed the vital signs, available nursing notes, past medical history, past surgical history, family history and social history. Vital Signs-Reviewed the patient's vital signs. Pulse Oximetry Analysis -  96% on room air (Interpretation)    Records Reviewed: Nursing Notes and Old Medical Records (Time of Review: 4:15 PM)    ED Course: Progress Notes, Reevaluation, and Consults:      Provider Notes (Medical Decision Making):   differential diagnosis: Fracture, contusion, sprain, dislocation, ligamentous injury    Plan: Pt presents in wheelchair, but in no significant distress. Vitals wnl. XR ankle shows lateral malleolus avulsion fracture and nonacute 5th metatarsal fracture. Knee XR negative. CT head and Cspine negative for anything acute. Pt refusing crutches here. Also requesting to keep her tennis shoes on in lieu of aircast splint. At this time, patient is stable and appropriate for discharge home.   Patient demonstrates understanding of current diagnoses and is in agreement with the treatment plan. They are advised that while the likelihood of serious underlying condition is low at this point given the evaluation performed today, we cannot fully rule it out. They are advised to immediately return with any new symptoms or worsening of current condition. All questions have been answered. Patient is given educational material regarding their diagnoses, including danger symptoms and when to return to the ED. Diagnosis     Clinical Impression:   1. Closed avulsion fracture of lateral malleolus of right fibula, initial encounter    2. Foot sprain, right, initial encounter    3. Sprain of right knee, unspecified ligament, initial encounter    4. Neck pain    5. Fall, initial encounter        Disposition: [place] Home    Follow-up Information     Follow up With Specialties Details Why Contact Info    Your orthopedic surgeon  Call For follow-up     [facility] EMERGENCY DEPT Emergency Medicine Go to As needed, If symptoms worsen [address] [place]  [phone]           Patient's Medications   Start Taking    CYCLOBENZAPRINE (FLEXERIL) 10 MG TABLET    Take 1 Tab by mouth three (3) times daily as needed for Muscle Spasm(s). HYDROCODONE-ACETAMINOPHEN (NORCO) 5-325 MG PER TABLET    Take 1 Tab by mouth every eight (8) hours as needed for Pain for up to 4 days. Max Daily Amount: 3 Tabs. Continue Taking    AMLODIPINE (NORVASC) 2.5 MG TABLET    Take 2.5 mg by mouth daily. ASPIRIN DELAYED-RELEASE 81 MG TABLET    Take  by mouth daily. BIOTIN 2,500 MCG TAB    Take  by mouth. CALCIUM CARBONATE (CALCIUM 600) 1,500 (600) MG TAB    Take 500 mg by mouth once. CALCIUM POLYCARBOPHIL (FIBERCON PO)    Take  by mouth. CHOLECALCIFEROL, VITAMIN D3, (VITAMIN D) 1,000 UNIT TABLET    Take  by mouth daily. CO-ENZYME Q-10 (CO Q-10) 100 MG CAPSULE    Take 100 mg by mouth daily. CYANOCOBALAMIN (VITAMIN B-12) 2,000 MCG TBER    Take 2,000 mcg by mouth once. DOXYCYCLINE HYCLATE (DORYX) 75 MG TBEC    Take 100 mg by mouth daily. DULOXETINE (CYMBALTA) 60 MG CAPSULE    Take 30 mg by mouth daily. ESOMEPRAZOLE (NEXIUM) 40 MG CAPSULE    Take 40 mg by mouth daily. FERROUS SULFATE (IRON) 325 MG (65 MG IRON) TABLET    Take  by mouth Daily (before breakfast). GEMFIBROZIL (LOPID) 600 MG TABLET    Take 600 mg by mouth two (2) times a day. GLIMEPIRIDE (AMARYL) 2 MG TABLET    Take  by mouth every morning. LEVOMILNACIPRAN (FETZIMA) 20 MG CAPSULE    Take 20 mg by mouth daily. MAGNESIUM 250 MG TAB    Take  by mouth daily. METFORMIN (GLUCOPHAGE) 500 MG TABLET    Take  by mouth two (2) times daily (with meals). METOPROLOL TARTRATE (LOPRESSOR) 25 MG TABLET    Take 0.5 Tabs by mouth two (2) times a day. OMEGA-3 FATTY ACIDS-VITAMIN E (FISH OIL) 1,000 MG CAP    Take 1 Cap by mouth daily. PIOGLITAZONE (ACTOS) 30 MG TABLET    Take  by mouth daily. PITAVASTATIN (LIVALO) 4 MG TAB TABLET    Take 4 mg by mouth daily. SENNA-DOCUSATE (PERICOLACE) 8.6-50 MG PER TABLET    Take 1 Tab by mouth daily. TORSEMIDE (DEMADEX) 20 MG TABLET    Take  by mouth daily. TRAZODONE (DESYREL) 100 MG TABLET    Take 100 mg by mouth nightly. WARFARIN (COUMADIN) 7.5 MG TABLET    Take 7.5 mg by mouth once as needed (7.5 mg x 5 days then 5mg for 1 day then repeat).    These Medications have changed    No medications on file   Stop Taking    No medications on file     _______________________________

## 2019-07-31 ENCOUNTER — OFFICE VISIT (OUTPATIENT)
Dept: UROLOGY | Age: 64
End: 2019-07-31

## 2019-07-31 ENCOUNTER — HOSPITAL ENCOUNTER (OUTPATIENT)
Dept: LAB | Age: 64
Discharge: HOME | End: 2019-07-31
Payer: MEDICARE

## 2019-07-31 VITALS — HEIGHT: 68 IN | BODY MASS INDEX: 28.74 KG/M2 | OXYGEN SATURATION: 96 % | HEART RATE: 93 BPM

## 2019-07-31 DIAGNOSIS — R31.0 GROSS HEMATURIA: Primary | ICD-10-CM

## 2019-07-31 DIAGNOSIS — R31.29 MICROSCOPIC HEMATURIA: ICD-10-CM

## 2019-07-31 LAB
BILIRUB UR QL STRIP: NORMAL
GLUCOSE UR-MCNC: NEGATIVE MG/DL
KETONES P FAST UR STRIP-MCNC: NORMAL MG/DL
PH UR STRIP: 5 [PH] (ref 4.6–8)
PROT UR QL STRIP: NEGATIVE
SP GR UR STRIP: 1.02 (ref 1–1.03)
UA UROBILINOGEN AMB POC: NORMAL (ref 0.2–1)
URINALYSIS CLARITY POC: CLEAR
URINALYSIS COLOR POC: YELLOW
URINE BLOOD POC: NEGATIVE
URINE LEUKOCYTES POC: NEGATIVE
URINE NITRITES POC: NEGATIVE

## 2019-07-31 PROCEDURE — 88112 CYTOPATH CELL ENHANCE TECH: CPT

## 2019-07-31 RX ORDER — OXYCODONE AND ACETAMINOPHEN 5; 325 MG/1; MG/1
TABLET ORAL
COMMUNITY
End: 2021-08-24

## 2019-07-31 NOTE — PATIENT INSTRUCTIONS
Blood in the Urine: Care Instructions Your Care Instructions Blood in the urine, or hematuria, may make the urine look red, brown, or pink. There may be blood every time you urinate or just from time to time. You cannot always see blood in the urine, but it will show up in a urine test. 
Blood in the urine may be serious. It should always be checked by a doctor. Your doctor may recommend more tests, including an X-ray, a CT scan, or a cystoscopy (which lets a doctor look inside the urethra and bladder). Blood in the urine can be a sign of another problem. Common causes are bladder infections and kidney stones. An injury to your groin or your genital area can also cause bleeding in the urinary tract. Very hard exercisesuch as running a marathoncan cause blood in the urine. Blood in the urine can also be a sign of kidney disease or cancer in the bladder or kidney. Many cases of blood in the urine are caused by a harmless condition that runs in families. This is called benign familial hematuria. It does not need any treatment. Sometimes your urine may look red or brown even though it does not contain blood. For example, not getting enough fluids (dehydration), taking certain medicines, or having a liver problem can change the color of your urine. Eating foods such as beets, rhubarb, or blackberries or foods with red food coloring can make your urine look red or pink. Follow-up care is a key part of your treatment and safety. Be sure to make and go to all appointments, and call your doctor if you are having problems. It's also a good idea to know your test results and keep a list of the medicines you take. When should you call for help? Call your doctor now or seek immediate medical care if: 
  · You have symptoms of a urinary infection. For example: ? You have pus in your urine. ? You have pain in your back just below your rib cage. This is called flank pain. ? You have a fever, chills, or body aches. ? It hurts to urinate. ? You have groin or belly pain.  
  · You have more blood in your urine.  
 Watch closely for changes in your health, and be sure to contact your doctor if: 
  · You have new urination problems.  
  · You do not get better as expected. Where can you learn more? Go to http://bc-angelina.info/. Enter M054 in the search box to learn more about \"Blood in the Urine: Care Instructions. \" Current as of: December 19, 2018 Content Version: 12.1 © 0717-0313 [employer]. Care instructions adapted under license by [employer] (which disclaims liability or warranty for this information). If you have questions about a medical condition or this instruction, always ask your healthcare professional. [address] any warranty or liability for your use of this information.

## 2019-07-31 NOTE — PROGRESS NOTES
Ms. Maria Del Rosario Lion has a reminder for a \"due or due soon\" health maintenance. I have asked that she contact her primary care provider for follow-up on this health maintenance.

## 2019-07-31 NOTE — PROGRESS NOTES
Gt Macias 59 y.o. female     Ms. Melissa Mina seen today for evaluation of a single episode of gross hematuria several weeks ago not associated with urgency frequency or dysuria currently anticoagulated with Coumadin treating peripheral arterial disease presenting with 1 boluses of liver spleen and pancreas in  has history of microscopic hematuria with-negative urologic evaluation in   Currently voiding with no difficulty sonali clear urine    Right foot metatarsal fracture 4 days ago    Review of Systems:   CNS: No seizure syncope headaches dizziness or visual changes-depression  Respiratory: No wheezing shortness of breath chest pain or coughing  Cardiovascular: Coronary artery disease no angina no palpitations  Intestinal: No dyspepsia diarrhea or constipation  Urinary: Microscopic hematuria  Skeletal: Chronic low back pain diffuse muscle aches and pains large joint arthritis  Endocrine: Diabetes  Other:                                                        Non-smoker x9 months                                      1 para 1  x1 for breech    Allergies: Allergies   Allergen Reactions    Latex Other (comments)     Blisters and bleeding from blisters.  Nitroglycerin Other (comments)     Patient states \"that when given medication last time she went into arrest\"    Morphine Shortness of Breath    Sulfa (Sulfonamide Antibiotics) Nausea Only    Tetanus Vaccines And Toxoid Swelling and Other (comments)     fever      Medications:    Current Outpatient Medications   Medication Sig Dispense Refill    oxyCODONE-acetaminophen (PERCOCET) 5-325 mg per tablet Take  by mouth every four (4) hours as needed for Pain.  calcium polycarbophil (FIBERCON PO) Take  by mouth.  gemfibrozil (LOPID) 600 mg tablet Take 600 mg by mouth two (2) times a day.  glimepiride (AMARYL) 2 mg tablet Take  by mouth every morning.  torsemide (DEMADEX) 20 mg tablet Take  by mouth daily.       pioglitazone (ACTOS) 30 mg tablet Take  by mouth daily.  levomilnacipran (FETZIMA) 20 mg capsule Take 20 mg by mouth daily.  metoprolol tartrate (LOPRESSOR) 25 mg tablet Take 0.5 Tabs by mouth two (2) times a day. 90 Tab 3    co-enzyme Q-10 (CO Q-10) 100 mg capsule Take 100 mg by mouth daily.  senna-docusate (PERICOLACE) 8.6-50 mg per tablet Take 1 Tab by mouth daily.  biotin 2,500 mcg tab Take  by mouth.  pitavastatin (LIVALO) 4 mg tab tablet Take 4 mg by mouth daily.  traZODone (DESYREL) 100 mg tablet Take 100 mg by mouth nightly.  aspirin delayed-release 81 mg tablet Take  by mouth daily.  ferrous sulfate (IRON) 325 mg (65 mg iron) tablet Take  by mouth Daily (before breakfast).  magnesium 250 mg Tab Take  by mouth daily.  warfarin (COUMADIN) 7.5 mg tablet Take 7.5 mg by mouth once as needed (7.5 mg x 5 days then 5mg for 1 day then repeat).  metFORMIN (GLUCOPHAGE) 500 mg tablet Take  by mouth two (2) times daily (with meals).  amLODIPine (NORVASC) 2.5 mg tablet Take 2.5 mg by mouth daily.  esomeprazole (NEXIUM) 40 mg capsule Take 40 mg by mouth daily.  DULoxetine (CYMBALTA) 60 mg capsule Take 30 mg by mouth daily.  cyanocobalamin (VITAMIN B-12) 2,000 mcg TbER Take 2,000 mcg by mouth once.  doxycycline hyclate (DORYX) 75 mg TbEC Take 100 mg by mouth daily.  omega-3 fatty acids-vitamin e (FISH OIL) 1,000 mg Cap Take 1 Cap by mouth daily.  cholecalciferol, vitamin d3, (VITAMIN D) 1,000 unit tablet Take  by mouth daily.  cyclobenzaprine (FLEXERIL) 10 mg tablet Take 1 Tab by mouth three (3) times daily as needed for Muscle Spasm(s). 12 Tab 0    CALCIUM CARBONATE (CALCIUM 600) 1,500 (600) mg Tab Take 500 mg by mouth once.          Past Medical History:   Diagnosis Date    Anemia     Aortic mural thrombus ([address] 11/24/2014    on coumadin followed by dr Tabor Body Aortic thrombus [facility]) 03/29/13    CAD (coronary artery disease)     Clot     Aortic clot    Clotting disorder (HCC)     Coronary atherosclerosis of native coronary artery     Stable, 18 mos. Since pce, will d/c effient    Diabetes mellitus     Gastrointestinal hemorrhage 2018    No clear etiology of GI bleed. Recent workup noted    Heart abnormalities     Hypercholesterolemia     Hypercoagulable state ([facility] 2018    Maintain on Coumadin. INR followed by PCP.     Hyperlipidemia     Iron deficiency     MI [facility]myocardial infarction) [facility]     Old myocardial infarction     Post RCA PCI    Other and unspecified hyperlipidemia     On multiple meds, pt had side effect of 28 differnet meds, currently on lipofen and livalo    Postsurgical percutaneous transluminal coronary angioplasty status     stable    Postsurgical percutaneous transluminal coronary angioplasty status     Raynaud's disease     Screening for depression     Splenic infarction 13    Ulcer       Past Surgical History:   Procedure Laterality Date    CARDIAC SURG PROCEDURE UNLIST      FLEXIBLE SIGMOIDOSCOPY N/A 2018    SIGMOIDOSCOPY FLEXIBLE performed by Belinda Beltrán MD at [address]       delivery    HX COLONOSCOPY      HX CORONARY STENT PLACEMENT      RCA, VALDEZ    HX GYN      HX HEENT      HX HYSTERECTOMY      abdominal    HX ORTHOPAEDIC      right shoulder surgery    HX TONSILLECTOMY      LAP,APPENDECTOMY N/A 2019    Dr. Ashleigh Chaudhari History     Socioeconomic History    Marital status:      Spouse name: Not on file    Number of children: Not on file    Years of education: Not on file    Highest education level: Not on file   Occupational History    Not on file   Social Needs    Financial resource strain: Not on file    Food insecurity:     Worry: Not on file     Inability: Not on file    Transportation needs:     Medical: Not on file     Non-medical: Not on file   Tobacco Use    Smoking status: Former Smoker     Packs/day: 2.50     Years: 40.00     Pack years: 100.00     Last attempt to quit: 2019     Years since quittin.5    Smokeless tobacco: Never Used    Tobacco comment: working on [employer]; chewing gum and using patch   Substance and Sexual Activity    Alcohol use: No     Comment: very rare    Drug use: No    Sexual activity: Not Currently     Partners: Male     Birth control/protection: None   Lifestyle    Physical activity:     Days per week: Not on file     Minutes per session: Not on file    Stress: Not on file   Relationships    Social connections:     Talks on phone: Not on file     Gets together: Not on file     Attends Yarsanism service: Not on file     Active member of club or organization: Not on file     Attends meetings of clubs or organizations: Not on file     Relationship status: Not on file    Intimate partner violence:     Fear of current or ex partner: Not on file     Emotionally abused: Not on file     Physically abused: Not on file     Forced sexual activity: Not on file   Other Topics Concern    Not on file   Social History Narrative    Not on file      Family History   Problem Relation Age of Onset    Cancer Mother     Heart Disease Mother     Cancer Father     Hypertension Other         essential    Heart Attack Other         myocardial infarction    Other Other         Hypercholesterolemia    Cancer Sister         Physical Examination: Well-nourished mature female in no apparent distress    abdomen is nontender   back no percussion CVA tenderness    Urinalysis: Negative dipstick/nitrite negative/heme-negative    Impression: Gross hematuria on Coumadin anticoagulation with history of microscopic hematuria/history of cigarette smoking        Plan: CT urogram    RTC 2 weeks cystoscopy following CT urogram    Described discussed prospect of cystoscopy with bilateral retrograde pyelograms      Urine cytology today        Gurpreet Dietz MD  -electronically signed-    PLEASE NOTE:  This document has been produced using voice recognition software. Unrecognized errors in transcription may be present.

## 2019-08-06 ENCOUNTER — HOSPITAL ENCOUNTER (OUTPATIENT)
Dept: CT IMAGING | Age: 64
Discharge: HOME | End: 2019-08-06
Attending: UROLOGY
Payer: MEDICARE

## 2019-08-06 DIAGNOSIS — R31.0 GROSS HEMATURIA: ICD-10-CM

## 2019-08-06 LAB — CREAT UR-MCNC: 1.2 MG/DL (ref 0.6–1.3)

## 2019-08-06 PROCEDURE — 74178 CT ABD&PLV WO CNTR FLWD CNTR: CPT

## 2019-08-06 PROCEDURE — 82565 ASSAY OF CREATININE: CPT

## 2019-08-06 PROCEDURE — 74011636320 HC RX REV CODE- 636/320: Performed by: UROLOGY

## 2019-08-06 RX ADMIN — IOPAMIDOL 80 ML: 612 INJECTION, SOLUTION INTRAVENOUS at 14:45

## 2019-08-29 ENCOUNTER — OFFICE VISIT (OUTPATIENT)
Dept: UROLOGY | Age: 64
End: 2019-08-29

## 2019-08-29 VITALS
DIASTOLIC BLOOD PRESSURE: 67 MMHG | OXYGEN SATURATION: 95 % | BODY MASS INDEX: 28.74 KG/M2 | SYSTOLIC BLOOD PRESSURE: 123 MMHG | HEIGHT: 68 IN | HEART RATE: 81 BPM

## 2019-08-29 DIAGNOSIS — Z79.2 PREVENTIVE ANTIBIOTIC: ICD-10-CM

## 2019-08-29 DIAGNOSIS — R31.0 GROSS HEMATURIA: Primary | ICD-10-CM

## 2019-08-29 LAB
BILIRUB UR QL STRIP: NEGATIVE
GLUCOSE UR-MCNC: NEGATIVE MG/DL
KETONES P FAST UR STRIP-MCNC: NEGATIVE MG/DL
PH UR STRIP: 5 [PH] (ref 4.6–8)
PROT UR QL STRIP: NEGATIVE
SP GR UR STRIP: 1.01 (ref 1–1.03)
UA UROBILINOGEN AMB POC: NORMAL (ref 0.2–1)
URINALYSIS CLARITY POC: CLEAR
URINALYSIS COLOR POC: YELLOW
URINE BLOOD POC: NORMAL
URINE LEUKOCYTES POC: NEGATIVE
URINE NITRITES POC: NEGATIVE

## 2019-08-29 RX ORDER — NITROFURANTOIN (MACROCRYSTALS) 100 MG/1
100 CAPSULE ORAL 2 TIMES DAILY
Qty: 4 CAP | Refills: 0 | Status: SHIPPED | OUTPATIENT
Start: 2019-08-29 | End: 2019-08-31

## 2019-08-29 RX ORDER — SULFAMETHOXAZOLE AND TRIMETHOPRIM 800; 160 MG/1; MG/1
1 TABLET ORAL 2 TIMES DAILY
Qty: 4 TAB | Refills: 0 | Status: CANCELLED | OUTPATIENT
Start: 2019-08-29 | End: 2019-08-31

## 2019-08-29 NOTE — PROGRESS NOTES
[facility]  OFFICE PROCEDURE PROGRESS NOTE        Chart reviewed for the following:   Annika VELASCO LPN, have reviewed the History, Physical and updated the Allergic reactions for [address] performed immediately prior to start of procedure:   Sahra Lemus LPN, have performed the following reviews on Rebecca Ch prior to the start of the procedure:            * Patient was identified by name and date of birth   * Agreement on procedure being performed was verified  * Risks and Benefits explained to the patient  * Procedure site verified and marked as necessary  * Patient was positioned for comfort  * Consent was signed and verified     Time: 1:42PM      Date of procedure: 8/29/2019    Procedure performed by:  Alycia Abreu MD    Provider assisted by: Dudley Leyva LPN    Patient assisted by: self    How tolerated by patient: tolerated the procedure well with no complications    Post Procedural Pain Scale: 0 - No Hurt    Comments: none    Patient verbalized understanding of procedure and post procedure instructions.

## 2019-08-29 NOTE — PATIENT INSTRUCTIONS
Cystoscopy: Care Instructions  Your Care Instructions  Cystoscopy is a test. It uses a thin, lighted tube called a cystoscope to see the inside of the bladder and the urethra. The urethra is the tube that carries urine out of the body. This test is helpful because it lets your doctor see areas of your bladder and urethra that are hard to see on X-rays. It can help your doctor find bladder stones, tumors, bleeding, and infection. During this test, your doctor also can take tissue and urine samples. And if your doctor finds small stones or growths, he or she can remove them. In most cases the scope is in the bladder for less than 10 minutes. But the entire test may take 45 minutes or longer. You will probably get local anesthesia. This numbs a small part of your body. Or you may get spinal anesthesia, which numbs more of your body. Once in a while, doctors use general anesthesia. It makes you sleep during surgery. If you get a local anesthetic, you may be able to get up right after the test. But if you had spinal or general anesthesia, you will stay in the recovery room until you are able to walk or you have feeling again in your lower body. This usually takes about an hour. Your doctor may be able to tell you some of the results right after the test. But the complete results may take several days. Follow-up care is a key part of your treatment and safety. Be sure to make and go to all appointments, and call your doctor if you are having problems. It's also a good idea to know your test results and keep a list of the medicines you take. How can you care for yourself at home? Before the test  · If you are having a local anesthetic, you can eat and drink before the test.  · If you are having a spinal or general anesthetic, do not eat or drink anything for at least 8 hours before the test. Tell your doctor what medicines you take.   · If you are not staying overnight in the hospital, make sure you have someone who can drive you home after the test.  After the test  · If your doctor prescribed antibiotics, take them as directed. Do not stop taking them just because you feel better. You need to take the full course of antibiotics. · You may have some burning when you urinate for a day or two after the test. You may feel better if you drink more fluids. This may also help prevent an infection. · Your urine may have a pinkish color for a few days after the test.  When should you call for help? Call your doctor now or seek immediate medical care if:    · Your urine is still red or you see blood clots after you have urinated several times.     · You cannot pass urine 8 hours after the test.     · You get a fever or chills.     · You have pain in your belly or your back just below your rib cage. This is also called flank pain.    Watch closely for changes in your health, and be sure to contact your doctor if:    · You have pain or burning when you urinate. A burning sensation is normal for a day or two after the test. But call if it does not get better.     · You have a frequent urge to urinate but can pass only small amounts of urine.     · Your urine is pink, red, or cloudy or smells bad. It is normal for the urine to have a pinkish color for a few days after the test. But call if it does not get better.     · You do not get better as expected. Where can you learn more? Go to http://bc-angelina.info/. Enter K177 in the search box to learn more about \"Cystoscopy: Care Instructions. \"  Current as of: December 19, 2018  Content Version: 12.1  © 6302-6871 [employer]. Care instructions adapted under license by [employer] (which disclaims liability or warranty for this information).  If you have questions about a medical condition or this instruction, always ask your healthcare professional. [address] any warranty or liability for your use of this information.

## 2019-08-29 NOTE — PROGRESS NOTES
Ms. Milla Matamoros has a reminder for a \"due or due soon\" health maintenance. I have asked that she contact her primary care provider for follow-up on this health maintenance.

## 2019-08-29 NOTE — PROGRESS NOTES
Lisette Crocker 59 y.o. female     Ms. Guillaume Oliveros seen today for cystoscopy assessing gross hematuria    single episode of gross hematuria several weeks ago not associated with urgency frequency or dysuria currently anticoagulated with Coumadin treating peripheral arterial disease presenting with 1 boluses of liver spleen and pancreas in  has history of microscopic hematuria with-negative urologic evaluation in   Currently voiding with no difficulty sonali clear urine     Right foot metatarsal fracture 4 days ago    CT urogram on 2019 shows normal kidneys ureters and bladder-images have been reviewed with the patient today on PACS downloaded for scanning into the EMR also laser printed for the patient    Urine cytology on 2019- for malignant cells     Review of Systems:   CNS: No seizure syncope headaches dizziness or visual changes-depression  Respiratory: No wheezing shortness of breath chest pain or coughing  Cardiovascular: Coronary artery disease no angina no palpitations  Intestinal: No dyspepsia diarrhea or constipation  Urinary: Microscopic hematuria  Skeletal: Chronic low back pain diffuse muscle aches and pains large joint arthritis  Endocrine: Diabetes  Other:                                                        Non-smoker x9 months                                      1 para 1  x1 for breech     Allergies: Allergies   Allergen Reactions    Latex Other (comments)     Blisters and bleeding from blisters.  Nitroglycerin Other (comments)     Patient states \"that when given medication last time she went into arrest\"    Morphine Shortness of Breath    Sulfa (Sulfonamide Antibiotics) Nausea Only    Tetanus Vaccines And Toxoid Swelling and Other (comments)     fever      Medications:    Current Outpatient Medications   Medication Sig Dispense Refill    oxyCODONE-acetaminophen (PERCOCET) 5-325 mg per tablet Take  by mouth every four (4) hours as needed for Pain.  gemfibrozil (LOPID) 600 mg tablet Take 600 mg by mouth two (2) times a day.  glimepiride (AMARYL) 2 mg tablet Take  by mouth every morning.  torsemide (DEMADEX) 20 mg tablet Take  by mouth daily.  pioglitazone (ACTOS) 30 mg tablet Take  by mouth daily.  levomilnacipran (FETZIMA) 20 mg capsule Take 20 mg by mouth daily.  metoprolol tartrate (LOPRESSOR) 25 mg tablet Take 0.5 Tabs by mouth two (2) times a day. 90 Tab 3    co-enzyme Q-10 (CO Q-10) 100 mg capsule Take 100 mg by mouth daily.  senna-docusate (PERICOLACE) 8.6-50 mg per tablet Take 1 Tab by mouth daily.  biotin 2,500 mcg tab Take  by mouth.  pitavastatin (LIVALO) 4 mg tab tablet Take 4 mg by mouth daily.  traZODone (DESYREL) 100 mg tablet Take 100 mg by mouth nightly.  aspirin delayed-release 81 mg tablet Take  by mouth daily.  ferrous sulfate (IRON) 325 mg (65 mg iron) tablet Take  by mouth Daily (before breakfast).  magnesium 250 mg Tab Take  by mouth daily.  warfarin (COUMADIN) 7.5 mg tablet Take 7.5 mg by mouth once as needed (7.5 mg x 5 days then 5mg for 1 day then repeat).  metFORMIN (GLUCOPHAGE) 500 mg tablet Take  by mouth two (2) times daily (with meals).  amLODIPine (NORVASC) 2.5 mg tablet Take 2.5 mg by mouth daily.  esomeprazole (NEXIUM) 40 mg capsule Take 40 mg by mouth daily.  DULoxetine (CYMBALTA) 60 mg capsule Take 30 mg by mouth daily.  CALCIUM CARBONATE (CALCIUM 600) 1,500 (600) mg Tab Take 500 mg by mouth once.  cyanocobalamin (VITAMIN B-12) 2,000 mcg TbER Take 2,000 mcg by mouth once.  doxycycline hyclate (DORYX) 75 mg TbEC Take 100 mg by mouth daily.  omega-3 fatty acids-vitamin e (FISH OIL) 1,000 mg Cap Take 1 Cap by mouth daily.  cholecalciferol, vitamin d3, (VITAMIN D) 1,000 unit tablet Take  by mouth daily.       cyclobenzaprine (FLEXERIL) 10 mg tablet Take 1 Tab by mouth three (3) times daily as needed for Muscle Spasm(s). 12 Tab 0    calcium polycarbophil (FIBERCON PO) Take  by mouth. Past Medical History:   Diagnosis Date    Anemia     Aortic mural thrombus ([facility] [address] 2014    on coumadin followed by dr Zachery Leonard Aortic thrombus [facility]) 13    CAD (coronary artery disease)     Clot     Aortic clot    Clotting disorder (HCC)     Coronary atherosclerosis of native coronary artery     Stable, 18 mos. Since pce, will d/c effient    Diabetes mellitus     Gastrointestinal hemorrhage 2018    No clear etiology of GI bleed. Recent workup noted    Heart abnormalities     Hypercholesterolemia     Hypercoagulable state ([facility] [address] 2018    Maintain on Coumadin. INR followed by PCP.     Hyperlipidemia     Iron deficiency     MI (myocardial infarction) ([facility] [address]     Old myocardial infarction     Post RCA PCI    Other and unspecified hyperlipidemia     On multiple meds, pt had side effect of 28 differnet meds, currently on lipofen and livalo    Postsurgical percutaneous transluminal coronary angioplasty status     stable    Postsurgical percutaneous transluminal coronary angioplasty status     Raynaud's disease     Screening for depression     Splenic infarction 13    Ulcer       Past Surgical History:   Procedure Laterality Date    CARDIAC SURG PROCEDURE UNLIST      FLEXIBLE SIGMOIDOSCOPY N/A 2018    SIGMOIDOSCOPY FLEXIBLE performed by Patricia Guevara MD at [address]       delivery    HX COLONOSCOPY      HX CORONARY STENT PLACEMENT      RCA, VALDEZ    HX GYN      HX HEENT      HX HYSTERECTOMY      abdominal    HX ORTHOPAEDIC      right shoulder surgery    HX TONSILLECTOMY      LAP,APPENDECTOMY N/A 2019    Dr. Lloyd Rings History     Socioeconomic History    Marital status:      Spouse name: Not on file    Number of children: Not on file    Years of education: Not on file    Highest education level: Not on file   Occupational History    Not on file   Social Needs    Financial resource strain: Not on file    Food insecurity:     Worry: Not on file     Inability: Not on file    Transportation needs:     Medical: Not on file     Non-medical: Not on file   Tobacco Use    Smoking status: Former Smoker     Packs/day: 2.50     Years: 40.00     Pack years: 100.00     Last attempt to quit: 2019     Years since quittin.6    Smokeless tobacco: Never Used    Tobacco comment: working on [employer]; chewing gum and using patch   Substance and Sexual Activity    Alcohol use: No     Comment: very rare    Drug use: No    Sexual activity: Not Currently     Partners: Male     Birth control/protection: None   Lifestyle    Physical activity:     Days per week: Not on file     Minutes per session: Not on file    Stress: Not on file   Relationships    Social connections:     Talks on phone: Not on file     Gets together: Not on file     Attends [employer] service: Not on file     Active member of club or organization: Not on file     Attends meetings of clubs or organizations: Not on file     Relationship status: Not on file    Intimate partner violence:     Fear of current or ex partner: Not on file     Emotionally abused: Not on file     Physically abused: Not on file     Forced sexual activity: Not on file   Other Topics Concern    Not on file   Social History Narrative    Not on file      Family History   Problem Relation Age of Onset    Cancer Mother     Heart Disease Mother     Cancer Father     Hypertension Other         essential    Heart Attack Other         myocardial infarction    Other Other         Hypercholesterolemia    Cancer Sister         Physical Examination: Well-nourished mature female in no apparent distress      Urinalysis: Negative dipstick/nitrite negative/heme-negative    Cystoscopy Report:     After prepping the introitus with Betadine solution and instilling 2% lidocaine jelly intraurethrally a flexible cystoscope was passed through the urethra into the bladder revealing normal urothelium throughout. There are no strictures, stones, tumors, or trabeculations. No abnormal blood vessels are evident no injection glomerulation friability or hemorrhages-ureteral orifices are both slitlike in appearance with clear urine jets observed from both sides. Procedure was uncomplicated well-tolerated  EBL-none  Specimen-none        Impression: Gross hematuria secondary to spontaneous bleeding from ectatic bladder neck blood vessels with bleeding facilitated by Coumadin anticoagulation      Plan: Septra DS twice daily x2 days             Reassurance-no significant lesions found accounting for gross hematuria this time    RTC PRN symptoms of urinary tract disease          More than 1/2 of this 25 minute visit was spent in counselling and coordination of care, as described above. Zahra He MD  -electronically signed-    PLEASE NOTE:  This document has been produced using voice recognition software. Unrecognized errors in transcription may be present.

## 2019-10-23 ENCOUNTER — HOSPITAL ENCOUNTER (OUTPATIENT)
Dept: MAMMOGRAPHY | Age: 64
Discharge: HOME | End: 2019-10-23
Attending: INTERNAL MEDICINE
Payer: MEDICARE

## 2019-10-23 ENCOUNTER — HOSPITAL ENCOUNTER (OUTPATIENT)
Dept: BONE DENSITY | Age: 64
Discharge: HOME | End: 2019-10-23
Attending: INTERNAL MEDICINE
Payer: MEDICARE

## 2019-10-23 DIAGNOSIS — Z12.31 VISIT FOR SCREENING MAMMOGRAM: ICD-10-CM

## 2019-10-23 DIAGNOSIS — M81.0 OSTEOPOROSIS: ICD-10-CM

## 2019-10-23 PROCEDURE — 77067 SCR MAMMO BI INCL CAD: CPT

## 2019-10-23 PROCEDURE — 77080 DXA BONE DENSITY AXIAL: CPT

## 2019-12-02 ENCOUNTER — NURSE NAVIGATOR (OUTPATIENT)
Dept: OTHER | Age: 64
End: 2019-12-02

## 2019-12-02 NOTE — NURSE NAVIGATOR
Referring Provider: Sana Andersen MD      Lung Cancer Risk Profile:   Age: 62  Gender: Female  Height: 67\"  Weight: 185#    Smoking History:  Smoking Status: past use  # years smokin  # years quit: 0.9  Packs/day: 2  Pack years: 80    Patient discussed smoking cessation with PCP: Unknown    Patient participated in shared decision making process with PCP: Unknown    Patient is currently experiencing symptoms: No    If yes what symptoms:     Co-Morbidities:   CAD    Cancer History:      Additional Risk Factors:  Exposure to second hand smoke      Patient's smoking history discussed via phone. Patient meets LDCT lung cancer screening criteria.  Call transferred to central scheduling to schedule exam.      NOEL MccallumN, RN, [address] [address] [facility] Nurse Navigator

## 2019-12-11 ENCOUNTER — HOSPITAL ENCOUNTER (OUTPATIENT)
Dept: CT IMAGING | Age: 64
Discharge: HOME | End: 2019-12-11
Attending: INTERNAL MEDICINE
Payer: MEDICARE

## 2019-12-11 DIAGNOSIS — F17.210 CIGARETTE SMOKER: ICD-10-CM

## 2019-12-11 PROCEDURE — G0297 LDCT FOR LUNG CA SCREEN: HCPCS

## 2019-12-18 ENCOUNTER — OFFICE VISIT (OUTPATIENT)
Dept: CARDIOLOGY CLINIC | Age: 64
End: 2019-12-18

## 2019-12-18 VITALS
HEIGHT: 68 IN | DIASTOLIC BLOOD PRESSURE: 64 MMHG | SYSTOLIC BLOOD PRESSURE: 99 MMHG | BODY MASS INDEX: 28.74 KG/M2 | HEART RATE: 72 BPM

## 2019-12-18 DIAGNOSIS — I25.10 ATHEROSCLEROSIS OF NATIVE CORONARY ARTERY OF NATIVE HEART WITHOUT ANGINA PECTORIS: Primary | ICD-10-CM

## 2019-12-18 DIAGNOSIS — I25.2 OLD MYOCARDIAL INFARCTION: ICD-10-CM

## 2019-12-18 DIAGNOSIS — Z98.61 POSTSURGICAL PERCUTANEOUS TRANSLUMINAL CORONARY ANGIOPLASTY STATUS: ICD-10-CM

## 2019-12-18 DIAGNOSIS — E78.5 HYPERLIPIDEMIA, UNSPECIFIED HYPERLIPIDEMIA TYPE: ICD-10-CM

## 2019-12-18 DIAGNOSIS — D68.59 HYPERCOAGULABLE STATE (HCC): ICD-10-CM

## 2019-12-18 RX ORDER — EZETIMIBE 10 MG/1
TABLET ORAL
COMMUNITY
End: 2021-08-23

## 2019-12-18 NOTE — PROGRESS NOTES
Patient brought medications list.    1. Have you been to the ER, urgent care clinic since your last visit? Hospitalized since your last visit? Yes When: 07/2019 Where: [facility] Reason for visit: Foot Pain    2. Have you seen or consulted any other health care providers outside of the [address] [place] [address] since your last visit? Include any pap smears or colon screening.  Yes Where: PCP/Orthopedic/Dermatology Reason for visit: Follow Up Visits

## 2019-12-18 NOTE — PROGRESS NOTES
HISTORY OF PRESENT ILLNESS  Amelie Saint is a 62 y.o. female. Patient with cad,old mi,hypercoaguble state,old pci. On follow up patient denies any chest pains,sob, palpitation or other significant symptoms. 5/2019 - S/P Appendectomy 1/2019 - requiring transfusion of 3 units FFP    Cholesterol Problem   The history is provided by the patient. This is a chronic problem. The problem occurs constantly. Pertinent negatives include no chest pain, no abdominal pain, no headaches and no shortness of breath. Review of Systems   Constitutional: Negative for chills and fever. HENT: Negative for nosebleeds. Eyes: Negative for blurred vision and double vision. Respiratory: Negative for cough, hemoptysis, sputum production, shortness of breath and wheezing. Cardiovascular: Negative for chest pain, palpitations, orthopnea, claudication, leg swelling and PND. Gastrointestinal: Negative for abdominal pain, heartburn, nausea and vomiting. Musculoskeletal: Negative for myalgias. Skin: Negative for rash. Neurological: Negative for dizziness, weakness and headaches. Endo/Heme/Allergies: Does not bruise/bleed easily. Family History   Problem Relation Age of Onset   Lanelle Delay Cancer Mother     Heart Disease Mother    Lanelle Delay Cancer Father     Hypertension Other         essential    Heart Attack Other         myocardial infarction    Other Other         Hypercholesterolemia    Cancer Sister        Past Medical History:   Diagnosis Date    Anemia     Aortic mural thrombus ([address] 11/24/2014    on coumadin followed by dr Kanwal Mckeon Aortic thrombus [facility]) 03/29/13    CAD (coronary artery disease)     Clot     Aortic clot    Clotting disorder (HCC)     Coronary atherosclerosis of native coronary artery     Stable, 18 mos. Since pce, will d/c effient    Diabetes mellitus     Gastrointestinal hemorrhage 6/25/2018    No clear etiology of GI bleed.   Recent workup noted    Heart abnormalities     Hypercholesterolemia     Hypercoagulable state ([facility] [address] 2018    Maintain on Coumadin. INR followed by PCP.  Hyperlipidemia     Iron deficiency     MI (myocardial infarction) [facility] [facility]     Old myocardial infarction     Post RCA PCI    Other and unspecified hyperlipidemia     On multiple meds, pt had side effect of 28 differnet meds, currently on lipofen and livalo    Postsurgical percutaneous transluminal coronary angioplasty status     stable    Postsurgical percutaneous transluminal coronary angioplasty status     Raynaud's disease     Screening for depression     Splenic infarction 13    Ulcer        Past Surgical History:   Procedure Laterality Date    CARDIAC SURG PROCEDURE UNLIST      FLEXIBLE SIGMOIDOSCOPY N/A 2018    SIGMOIDOSCOPY FLEXIBLE performed by Jayy Dela Cruz MD at [address]       delivery    HX COLONOSCOPY      HX CORONARY STENT PLACEMENT      RCA, VALDEZ    HX GYN      HX HEENT      HX HYSTERECTOMY      abdominal    HX OOPHORECTOMY      1 removed    HX ORTHOPAEDIC      right shoulder surgery    HX TONSILLECTOMY      LAP,APPENDECTOMY N/A 2019    Dr. Celina Mckinney History     Tobacco Use    Smoking status: Former Smoker     Packs/day: 2.50     Years: 40.00     Pack years: 100.00     Last attempt to quit: 2019     Years since quittin.9    Smokeless tobacco: Never Used    Tobacco comment: working on quiting; chewing gum and using patch   Substance Use Topics    Alcohol use: No     Comment: very rare       Allergies   Allergen Reactions    Latex Other (comments)     Blisters and bleeding from blisters.     Nitroglycerin Other (comments)     Patient states \"that when given medication last time she went into arrest\"    Morphine Shortness of Breath    Sulfa (Sulfonamide Antibiotics) Nausea Only    Tetanus Vaccines And Toxoid Swelling and Other (comments)     fever       Current Outpatient Medications   Medication Sig    ezetimibe (ZETIA) 10 mg tablet Take  by mouth.  evolocumab (REPATHA SURECLICK) pen injection 1 mL by SubCUTAneous route every fourteen (14) days.  oxyCODONE-acetaminophen (PERCOCET) 5-325 mg per tablet Take  by mouth every four (4) hours as needed for Pain.  cyclobenzaprine (FLEXERIL) 10 mg tablet Take 1 Tab by mouth three (3) times daily as needed for Muscle Spasm(s).  calcium polycarbophil (FIBERCON PO) Take  by mouth.  gemfibrozil (LOPID) 600 mg tablet Take 600 mg by mouth two (2) times a day.  glimepiride (AMARYL) 2 mg tablet Take  by mouth every morning.  torsemide (DEMADEX) 20 mg tablet Take  by mouth daily.  pioglitazone (ACTOS) 30 mg tablet Take  by mouth daily.  levomilnacipran (FETZIMA) 20 mg capsule Take 20 mg by mouth daily.  metoprolol tartrate (LOPRESSOR) 25 mg tablet Take 0.5 Tabs by mouth two (2) times a day.  co-enzyme Q-10 (CO Q-10) 100 mg capsule Take 100 mg by mouth daily.  senna-docusate (PERICOLACE) 8.6-50 mg per tablet Take 1 Tab by mouth daily.  biotin 2,500 mcg tab Take  by mouth.  pitavastatin (LIVALO) 4 mg tab tablet Take 4 mg by mouth daily.  traZODone (DESYREL) 100 mg tablet Take 100 mg by mouth nightly.  aspirin delayed-release 81 mg tablet Take  by mouth daily.  ferrous sulfate (IRON) 325 mg (65 mg iron) tablet Take  by mouth Daily (before breakfast).  magnesium 250 mg Tab Take  by mouth daily.  warfarin (COUMADIN) 7.5 mg tablet Take 7.5 mg by mouth once as needed (7.5 mg x 5 days then 5mg for 1 day then repeat).  metFORMIN (GLUCOPHAGE) 500 mg tablet Take 500 mg by mouth daily (with breakfast).  amLODIPine (NORVASC) 2.5 mg tablet Take 2.5 mg by mouth daily.  esomeprazole (NEXIUM) 40 mg capsule Take 40 mg by mouth daily.  DULoxetine (CYMBALTA) 60 mg capsule Take 60 mg by mouth daily.  CALCIUM CARBONATE (CALCIUM 600) 1,500 (600) mg Tab Take 500 mg by mouth once.     cyanocobalamin (VITAMIN B-12) 2,000 mcg TbER Take 2,000 mcg by mouth once.  doxycycline hyclate (DORYX) 75 mg TbEC Take 100 mg by mouth daily.  omega-3 fatty acids-vitamin e (FISH OIL) 1,000 mg Cap Take 1 Cap by mouth daily.  cholecalciferol, vitamin d3, (VITAMIN D) 1,000 unit tablet Take  by mouth daily. No current facility-administered medications for this visit. Visit Vitals  BP 99/64   Pulse 72   Ht 5' 8\" (1.727 m)   BMI 28.74 kg/m²         Physical Exam   Constitutional: She is oriented to person, place, and time. She appears well-developed and well-nourished. HENT:   Head: Normocephalic and atraumatic. Eyes: Conjunctivae are normal.   Neck: Neck supple. No JVD present. No tracheal deviation present. No thyromegaly present. Cardiovascular: Normal rate and regular rhythm. PMI is not displaced. Exam reveals no gallop and no decreased pulses. Murmur heard. Early systolic murmur is present at the upper right sternal border. Pulmonary/Chest: No respiratory distress. She has no wheezes. She has no rales. She exhibits no tenderness. Abdominal: Soft. There is no tenderness. Musculoskeletal:         General: No edema. Neurological: She is alert and oriented to person, place, and time. Skin: Skin is warm. Psychiatric: She has a normal mood and affect. Ms. Ronnie Pugh has a reminder for a \"due or due soon\" health maintenance. I have asked that she contact her primary care provider for follow-up on this health maintenance. No flowsheet data found. Impression:cta-2014    Small amount of residual thrombus remains in the left lateral wall of the distal thoracic aorta.  The large luminal filling defect is no longer evident.  No new filling defects. Ulcerated plaque at the abdominal aorta at the level of the JOLLY posteriorly also stable in appearance. Mesenteric narrowings as above similar to prior exam.    Resolving splenic infarct. No recurrence of pancreatitis. Hepatic steatosis.     Splenic and renal infarcts as seen previously. Interpretation Summary 9/2018  · Calculated left ventricular ejection fraction is 55%. Normal left ventricular wall motion, no regional wall motion abnormality noted. Mild (grade 1) left ventricular diastolic dysfunction. · Trace mitral valve regurgitation. · Trivial pericardial effusion. Effusion is is fibrinous. Procedure Conclusion 9/2018  Nuclear Stress Test   Negative myocardial perfusion imaging. Myocardial perfusion imaging supports a low risk stress test.   There is a prior study available for comparison. As compared to the previous study, there are no significant changes. Normal EF:% 57      Interpretation Summary   · Gated SPECT: Left ventricular function post-stress was normal. Calculated ejection fraction is 61%. · Baseline ECG: Normal sinus rhythm. · Negative stress electrocardiogram.  · Left ventricular perfusion is normal.  · Negative myocardial perfusion imaging. Myocardial perfusion imaging supports a low risk stress test.       Assessment       ICD-10-CM ICD-9-CM    1. Atherosclerosis of native coronary artery of native heart without angina pectoris I25.10 414.01     Stable continue current medical management   2. Old myocardial infarction I25.2 412     Stable on treatment   3. Hyperlipidemia, unspecified hyperlipidemia type E78.5 272.4     Severe elevated LDL triglyceride in the past.  Recent labs reportedly high will follow-up. Likely need for PCSK9 inhibitor   4. Postsurgical percutaneous transluminal coronary angioplasty status Z98.61 V45.82     Stable   5. Hypercoagulable state ([facility] [address] D68.59 289.81     On Coumadin followed by PCP     10/2018  History of aortic mural thrombus on Coumadin followed by PCP. Recent concern on GI bleed being evaluated by Dr. Ben Vallecillo. Cardiac status remains stable    5/2019 - Cardiac status stable. Appendicitis in January, recovering well. Has stopped smoking!  Continue Warfarin for history of mural thrombus - INR followed by PCP.  2019  Cardiac status stable. Discussed use of PCSK9 inhibitor. Prescribed at this point. .  At small lung nodule on low-dose CT scan. Has follow-up evaluation pending . There are no discontinued medications. Orders Placed This Encounter    evolocumab (REPATHA SURECLICK) pen injection     Si mL by SubCUTAneous route every fourteen (14) days. Dispense:  6 Pen     Refill:  5       Follow-up and Dispositions    · Return in about 6 months (around 2020).

## 2021-07-23 ENCOUNTER — TRANSCRIBE ORDER (OUTPATIENT)
Dept: SCHEDULING | Age: 66
End: 2021-07-23

## 2021-07-23 DIAGNOSIS — M48.061 SPINAL STENOSIS, LUMBAR REGION, WITHOUT NEUROGENIC CLAUDICATION: Primary | ICD-10-CM

## 2021-07-23 DIAGNOSIS — M47.812 CERVICAL SPONDYLOSIS WITHOUT MYELOPATHY: Primary | ICD-10-CM

## 2021-07-26 ENCOUNTER — HOSPITAL ENCOUNTER (OUTPATIENT)
Age: 66
Discharge: HOME | End: 2021-07-26
Attending: ORTHOPAEDIC SURGERY
Payer: MEDICARE

## 2021-07-26 DIAGNOSIS — M47.812 CERVICAL SPONDYLOSIS WITHOUT MYELOPATHY: ICD-10-CM

## 2021-07-26 DIAGNOSIS — M48.061 SPINAL STENOSIS, LUMBAR REGION, WITHOUT NEUROGENIC CLAUDICATION: ICD-10-CM

## 2021-07-26 PROCEDURE — 72141 MRI NECK SPINE W/O DYE: CPT

## 2021-07-26 PROCEDURE — 72148 MRI LUMBAR SPINE W/O DYE: CPT

## 2021-08-22 ENCOUNTER — APPOINTMENT (OUTPATIENT)
Dept: MRI IMAGING | Age: 66
DRG: 871 | End: 2021-08-22
Attending: STUDENT IN AN ORGANIZED HEALTH CARE EDUCATION/TRAINING PROGRAM
Payer: MEDICARE

## 2021-08-22 ENCOUNTER — APPOINTMENT (OUTPATIENT)
Dept: CT IMAGING | Age: 66
DRG: 871 | End: 2021-08-22
Attending: STUDENT IN AN ORGANIZED HEALTH CARE EDUCATION/TRAINING PROGRAM
Payer: MEDICARE

## 2021-08-22 ENCOUNTER — HOSPITAL ENCOUNTER (INPATIENT)
Age: 66
LOS: 2 days | Discharge: HOME | DRG: 871 | End: 2021-08-24
Attending: STUDENT IN AN ORGANIZED HEALTH CARE EDUCATION/TRAINING PROGRAM | Admitting: STUDENT IN AN ORGANIZED HEALTH CARE EDUCATION/TRAINING PROGRAM
Payer: MEDICARE

## 2021-08-22 DIAGNOSIS — I74.10 AORTIC MURAL THROMBUS (HCC): ICD-10-CM

## 2021-08-22 DIAGNOSIS — N30.00 ACUTE CYSTITIS WITHOUT HEMATURIA: ICD-10-CM

## 2021-08-22 DIAGNOSIS — I25.10 ATHEROSCLEROSIS OF NATIVE CORONARY ARTERY OF NATIVE HEART WITHOUT ANGINA PECTORIS: ICD-10-CM

## 2021-08-22 DIAGNOSIS — E78.2 MIXED HYPERLIPIDEMIA: ICD-10-CM

## 2021-08-22 DIAGNOSIS — R42 DIZZINESS: Primary | ICD-10-CM

## 2021-08-22 PROBLEM — N39.0 UTI (URINARY TRACT INFECTION): Status: ACTIVE | Noted: 2021-08-22

## 2021-08-22 PROBLEM — R41.82 AMS (ALTERED MENTAL STATUS): Status: ACTIVE | Noted: 2021-08-22

## 2021-08-22 LAB
ALBUMIN SERPL-MCNC: 3.1 G/DL (ref 3.4–5)
ALBUMIN/GLOB SERPL: 1 {RATIO} (ref 0.8–1.7)
ALP SERPL-CCNC: 63 U/L (ref 45–117)
ALT SERPL-CCNC: 25 U/L (ref 13–56)
ANION GAP SERPL CALC-SCNC: 4 MMOL/L (ref 3–18)
APPEARANCE UR: ABNORMAL
AST SERPL-CCNC: 25 U/L (ref 10–38)
BACTERIA URNS QL MICRO: ABNORMAL /HPF
BASOPHILS # BLD: 0 K/UL (ref 0–0.1)
BASOPHILS NFR BLD: 0 % (ref 0–2)
BILIRUB SERPL-MCNC: 0.4 MG/DL (ref 0.2–1)
BILIRUB UR QL: NEGATIVE
BUN SERPL-MCNC: 15 MG/DL (ref 7–18)
BUN/CREAT SERPL: 13 (ref 12–20)
CALCIUM SERPL-MCNC: 8.1 MG/DL (ref 8.5–10.1)
CHLORIDE SERPL-SCNC: 105 MMOL/L (ref 100–111)
CK MB CFR SERPL CALC: NORMAL % (ref 0–4)
CK MB SERPL-MCNC: <1 NG/ML (ref 5–25)
CK SERPL-CCNC: 68 U/L (ref 26–192)
CO2 SERPL-SCNC: 28 MMOL/L (ref 21–32)
COLOR UR: YELLOW
COVID-19 RAPID TEST, COVR: NOT DETECTED
CREAT SERPL-MCNC: 1.17 MG/DL (ref 0.6–1.3)
DIFFERENTIAL METHOD BLD: ABNORMAL
EOSINOPHIL # BLD: 0 K/UL (ref 0–0.4)
EOSINOPHIL NFR BLD: 0 % (ref 0–5)
EPITH CASTS URNS QL MICRO: ABNORMAL /LPF (ref 0–5)
ERYTHROCYTE [DISTWIDTH] IN BLOOD BY AUTOMATED COUNT: 13.8 % (ref 11.6–14.5)
GLOBULIN SER CALC-MCNC: 3.2 G/DL (ref 2–4)
GLUCOSE SERPL-MCNC: 99 MG/DL (ref 74–99)
GLUCOSE UR STRIP.AUTO-MCNC: NEGATIVE MG/DL
HCT VFR BLD AUTO: 33 % (ref 35–45)
HGB BLD-MCNC: 10.9 G/DL (ref 12–16)
HGB UR QL STRIP: ABNORMAL
KETONES UR QL STRIP.AUTO: NEGATIVE MG/DL
LEUKOCYTE ESTERASE UR QL STRIP.AUTO: NEGATIVE
LYMPHOCYTES # BLD: 1.4 K/UL (ref 0.9–3.6)
LYMPHOCYTES NFR BLD: 18 % (ref 21–52)
MCH RBC QN AUTO: 30.5 PG (ref 24–34)
MCHC RBC AUTO-ENTMCNC: 33 G/DL (ref 31–37)
MCV RBC AUTO: 92.4 FL (ref 74–97)
MONOCYTES # BLD: 0.8 K/UL (ref 0.05–1.2)
MONOCYTES NFR BLD: 9 % (ref 3–10)
NEUTS SEG # BLD: 5.8 K/UL (ref 1.8–8)
NEUTS SEG NFR BLD: 72 % (ref 40–73)
NITRITE UR QL STRIP.AUTO: POSITIVE
PH UR STRIP: 8.5 [PH] (ref 5–8)
PLATELET # BLD AUTO: 214 K/UL (ref 135–420)
PMV BLD AUTO: 10.3 FL (ref 9.2–11.8)
POTASSIUM SERPL-SCNC: 4.3 MMOL/L (ref 3.5–5.5)
PROT SERPL-MCNC: 6.3 G/DL (ref 6.4–8.2)
PROT UR STRIP-MCNC: ABNORMAL MG/DL
RBC # BLD AUTO: 3.57 M/UL (ref 4.2–5.3)
RBC #/AREA URNS HPF: ABNORMAL /HPF (ref 0–5)
SODIUM SERPL-SCNC: 137 MMOL/L (ref 136–145)
SOURCE, COVRS: NORMAL
SP GR UR REFRACTOMETRY: >1.03 (ref 1–1.03)
TROPONIN I SERPL-MCNC: <0.02 NG/ML (ref 0–0.04)
UROBILINOGEN UR QL STRIP.AUTO: 0.2 EU/DL (ref 0.2–1)
WBC # BLD AUTO: 8.1 K/UL (ref 4.6–13.2)
WBC URNS QL MICRO: ABNORMAL /HPF (ref 0–4)

## 2021-08-22 PROCEDURE — 74011250636 HC RX REV CODE- 250/636: Performed by: STUDENT IN AN ORGANIZED HEALTH CARE EDUCATION/TRAINING PROGRAM

## 2021-08-22 PROCEDURE — 65270000029 HC RM PRIVATE

## 2021-08-22 PROCEDURE — 74011000636 HC RX REV CODE- 636: Performed by: STUDENT IN AN ORGANIZED HEALTH CARE EDUCATION/TRAINING PROGRAM

## 2021-08-22 PROCEDURE — 96374 THER/PROPH/DIAG INJ IV PUSH: CPT

## 2021-08-22 PROCEDURE — 81001 URINALYSIS AUTO W/SCOPE: CPT

## 2021-08-22 PROCEDURE — 80053 COMPREHEN METABOLIC PANEL: CPT

## 2021-08-22 PROCEDURE — 70498 CT ANGIOGRAPHY NECK: CPT

## 2021-08-22 PROCEDURE — 96375 TX/PRO/DX INJ NEW DRUG ADDON: CPT

## 2021-08-22 PROCEDURE — 70450 CT HEAD/BRAIN W/O DYE: CPT

## 2021-08-22 PROCEDURE — 85025 COMPLETE CBC W/AUTO DIFF WBC: CPT

## 2021-08-22 PROCEDURE — 87077 CULTURE AEROBIC IDENTIFY: CPT

## 2021-08-22 PROCEDURE — 87086 URINE CULTURE/COLONY COUNT: CPT

## 2021-08-22 PROCEDURE — 74011250637 HC RX REV CODE- 250/637: Performed by: STUDENT IN AN ORGANIZED HEALTH CARE EDUCATION/TRAINING PROGRAM

## 2021-08-22 PROCEDURE — 87635 SARS-COV-2 COVID-19 AMP PRB: CPT

## 2021-08-22 PROCEDURE — 74011000250 HC RX REV CODE- 250: Performed by: STUDENT IN AN ORGANIZED HEALTH CARE EDUCATION/TRAINING PROGRAM

## 2021-08-22 PROCEDURE — 70551 MRI BRAIN STEM W/O DYE: CPT

## 2021-08-22 PROCEDURE — 99285 EMERGENCY DEPT VISIT HI MDM: CPT

## 2021-08-22 PROCEDURE — 87186 SC STD MICRODIL/AGAR DIL: CPT

## 2021-08-22 PROCEDURE — 74011250636 HC RX REV CODE- 250/636: Performed by: EMERGENCY MEDICINE

## 2021-08-22 PROCEDURE — 93005 ELECTROCARDIOGRAM TRACING: CPT

## 2021-08-22 PROCEDURE — 82553 CREATINE MB FRACTION: CPT

## 2021-08-22 RX ORDER — ACETAMINOPHEN 325 MG/1
975 TABLET ORAL
Status: COMPLETED | OUTPATIENT
Start: 2021-08-22 | End: 2021-08-22

## 2021-08-22 RX ORDER — LORAZEPAM 2 MG/ML
1 INJECTION INTRAMUSCULAR
Status: COMPLETED | OUTPATIENT
Start: 2021-08-22 | End: 2021-08-22

## 2021-08-22 RX ADMIN — ACETAMINOPHEN 975 MG: 325 TABLET ORAL at 23:42

## 2021-08-22 RX ADMIN — IOPAMIDOL 100 ML: 755 INJECTION, SOLUTION INTRAVENOUS at 10:32

## 2021-08-22 RX ADMIN — SODIUM CHLORIDE 1000 ML: 900 INJECTION, SOLUTION INTRAVENOUS at 23:42

## 2021-08-22 RX ADMIN — WATER 1 G: 1 INJECTION INTRAMUSCULAR; INTRAVENOUS; SUBCUTANEOUS at 23:42

## 2021-08-22 RX ADMIN — LORAZEPAM 1 MG: 2 INJECTION INTRAMUSCULAR; INTRAVENOUS at 17:57

## 2021-08-22 NOTE — ED TRIAGE NOTES
Per ems, patient onset started last night at 10 pm. Patient fell last night and this morning. EMS attempted to place patient back in bed but noticed patient was leaning to the right and was unbalanced. Patient state she is dizzy and has a headache. Patient has hx MI (patient take coumadin) and diabetes. Patient recently diagnosis with fibromyalgia and started on enio. Per patient  recently doubled the dose. Glucose 123 per EMS.

## 2021-08-22 NOTE — ED PROVIDER NOTES
Patient 71-year-old female with a history of myocardial infarction, hypercholesterolemia, unspecified hypercoagulable state with previous aortic thrombus, and peptic ulcer disease. Patient visited with primary complaint of sudden onset of dizziness upon awakening patient reports persistent dizziness since she awoke this morning at approximately 3 AM with a last known well at approximately 10 PM yesterday. Patient denies any focal weakness, double vision, visual field loss, paresthesias, or new headache. Past Medical History:   Diagnosis Date    Anemia     Aortic mural thrombus ([facility] [address] 2014    on coumadin followed by dr Damon Brooks Aortic thrombus [facility]) 13    CAD (coronary artery disease)     Clot     Aortic clot    Clotting disorder (HCC)     Coronary atherosclerosis of native coronary artery     Stable, 18 mos. Since pce, will d/c effient    Diabetes mellitus     Gastrointestinal hemorrhage 2018    No clear etiology of GI bleed. Recent workup noted    Heart abnormalities     Hypercholesterolemia     Hypercoagulable state ([address] 2018    Maintain on Coumadin. INR followed by PCP.     Hyperlipidemia     Iron deficiency     MI (myocardial infarction) ([address]     Old myocardial infarction     Post RCA PCI    Other and unspecified hyperlipidemia     On multiple meds, pt had side effect of 28 differnet meds, currently on lipofen and livalo    Postsurgical percutaneous transluminal coronary angioplasty status     stable    Postsurgical percutaneous transluminal coronary angioplasty status     Raynaud's disease     Screening for depression     Splenic infarction 13    Ulcer        Past Surgical History:   Procedure Laterality Date    CARDIAC SURG PROCEDURE UNLIST      FLEXIBLE SIGMOIDOSCOPY N/A 2018    SIGMOIDOSCOPY FLEXIBLE performed by Kayla Manriquez MD at [address] HX  SECTION       delivery    HX COLONOSCOPY      HX CORONARY STENT PLACEMENT      RCA, VALDEZ    HX GYN      HX HEENT      HX HYSTERECTOMY      abdominal    HX OOPHORECTOMY      1 removed    HX ORTHOPAEDIC      right shoulder surgery    HX TONSILLECTOMY      ME LAP,APPENDECTOMY N/A 2019    Dr. Jud Trent         Family History:   Problem Relation Age of Onset    Cancer Mother     Heart Disease Mother     Cancer Father     Hypertension Other         essential    Heart Attack Other         myocardial infarction    Other Other         Hypercholesterolemia    Cancer Sister        Social History     Socioeconomic History    Marital status:      Spouse name: Not on file    Number of children: Not on file    Years of education: Not on file    Highest education level: Not on file   Occupational History    Not on file   Tobacco Use    Smoking status: Former Smoker     Packs/day: 2.50     Years: 40.00     Pack years: 100.00     Quit date: 2019     Years since quittin.6    Smokeless tobacco: Never Used    Tobacco comment: working on [employer]; chewing gum and using patch   Substance and Sexual Activity    Alcohol use: No     Comment: very rare    Drug use: No    Sexual activity: Not Currently     Partners: Male     Birth control/protection: None   Other Topics Concern    Not on file   Social History Narrative    Not on file     Social Determinants of Health     Financial Resource Strain:     Difficulty of Paying Living Expenses:    Food Insecurity:     Worried About Running Out of Food in the Last Year:     [address] in the Last Year:    Transportation Needs:     Lack of Transportation (Medical):      Lack of Transportation (Non-Medical):    Physical Activity:     Days of Exercise per Week:     Minutes of Exercise per Session:    Stress:     Feeling of Stress :    Social Connections:     Frequency of Communication with Friends and Family:     Frequency of Social Gatherings with Friends and Family:     Attends Baptism Services:     Active Member of Clubs or Organizations:     Attends Club or Organization Meetings:     Marital Status:    Intimate Partner Violence:     Fear of Current or Ex-Partner:     Emotionally Abused:     Physically Abused:     Sexually Abused: ALLERGIES: Latex, Nitroglycerin, Morphine, Sulfa (sulfonamide antibiotics), and Tetanus vaccines and toxoid    Review of Systems   Constitutional: Negative for chills and fever. HENT: Negative for rhinorrhea and sore throat. Eyes: Negative for discharge and redness. Respiratory: Negative for cough and shortness of breath. Cardiovascular: Negative for chest pain and leg swelling. Gastrointestinal: Negative for abdominal pain, diarrhea, nausea and vomiting. Genitourinary: Negative for difficulty urinating and dysuria. Musculoskeletal: Negative for back pain and neck pain. Skin: Negative for rash and wound. Neurological: Positive for dizziness. Negative for tremors, seizures, syncope, facial asymmetry, speech difficulty, weakness, light-headedness, numbness and headaches. Vitals:    08/22/21 0826 08/22/21 1030 08/22/21 1100 08/22/21 1400   BP: 112/64 (!) 114/42 (!) 111/36 105/70   Pulse: 76 78 76 88   Resp: 16 20 19 27   Temp: 99.9 °F (37.7 °C)      SpO2: 97% 95% 92% 91%   Weight: 90.7 kg (200 lb)      Height: 5' 8\" (1.727 m)               Physical Exam  Constitutional:       General: She is not in acute distress. Appearance: She is not ill-appearing, toxic-appearing or diaphoretic. HENT:      Head: Normocephalic and atraumatic. Right Ear: External ear normal.      Left Ear: External ear normal.      Nose: No congestion or rhinorrhea. Mouth/Throat:      Mouth: Mucous membranes are moist.      Pharynx: No oropharyngeal exudate or posterior oropharyngeal erythema. Eyes:      General: No visual field deficit. Right eye: No discharge. Left eye: No discharge.       Extraocular Movements:      Right eye: Nystagmus present. Left eye: Nystagmus present. Pupils: Pupils are equal, round, and reactive to light. Visual Fields:      Right eye: CF in the upper temporal quadrant. CF in the upper nasal quadrant. CF in the lower temporal quadrant. CF in the lower nasal quadrant. Left eye: CF in the upper nasal quadrant. CF in the upper temporal quadrant. CF in the lower nasal quadrant. CF in the lower temporal quadrant. Comments: Symmetric horizontal bilateral nystagmus, no vertical nystagmus   Neck:      Vascular: No carotid bruit. Cardiovascular:      Rate and Rhythm: Normal rate and regular rhythm. Heart sounds: No murmur heard. No friction rub. No gallop. Pulmonary:      Effort: Pulmonary effort is normal. No respiratory distress. Breath sounds: No stridor. No wheezing, rhonchi or rales. Abdominal:      General: Abdomen is flat. There is no distension. Tenderness: There is no abdominal tenderness. There is no right CVA tenderness, left CVA tenderness, guarding or rebound. Musculoskeletal:         General: No swelling, tenderness, deformity or signs of injury. Cervical back: No rigidity or tenderness. Right lower leg: No edema. Left lower leg: No edema. Lymphadenopathy:      Cervical: No cervical adenopathy. Skin:     General: Skin is warm. Capillary Refill: Capillary refill takes less than 2 seconds. Coloration: Skin is not jaundiced or pale. Findings: No bruising, erythema, lesion or rash. Neurological:      General: No focal deficit present. Mental Status: She is alert and oriented to person, place, and time. GCS: GCS eye subscore is 4. GCS verbal subscore is 5. GCS motor subscore is 6. Cranial Nerves: No cranial nerve deficit, dysarthria or facial asymmetry. Sensory: No sensory deficit. Motor: No weakness, tremor, abnormal muscle tone, seizure activity or pronator drift.       Coordination: Coordination normal. Finger-Nose-Finger Test normal.   Psychiatric:         Mood and Affect: Mood normal.          MDM  Number of Diagnoses or Management Options  Diagnosis management comments: Presents with a primary complaint of sudden onset dizziness, while patient has recently started Lyrica greater than 3 weeks prior had reports a medication adjustment and with an increased dose at nights 2 weeks ago she reports she does not consistently have any associated dizziness with that medication and given the how long she has been on it I fear this is less likely especially given patient's history of a hypercoagulable state and previous aorta thrombus. Patient symptoms may represent a posterior stroke, will proceed with work-up including CT, CTA, and MRI to better evaluate.   TPA, thrombectomy were considered but given delayed presentation with an unclear last known well and no symptoms consistent with an LVO we will proceed with a urgent work-up in the emergency department       Amount and/or Complexity of Data Reviewed  Clinical lab tests: reviewed  Tests in the radiology section of CPT®: reviewed           Procedures

## 2021-08-23 ENCOUNTER — APPOINTMENT (OUTPATIENT)
Dept: NON INVASIVE DIAGNOSTICS | Age: 66
DRG: 871 | End: 2021-08-23
Attending: STUDENT IN AN ORGANIZED HEALTH CARE EDUCATION/TRAINING PROGRAM
Payer: MEDICARE

## 2021-08-23 ENCOUNTER — APPOINTMENT (OUTPATIENT)
Dept: GENERAL RADIOLOGY | Age: 66
DRG: 871 | End: 2021-08-23
Attending: INTERNAL MEDICINE
Payer: MEDICARE

## 2021-08-23 LAB
ATRIAL RATE: 71 BPM
CALCULATED P AXIS, ECG09: 22 DEGREES
CALCULATED R AXIS, ECG10: 45 DEGREES
CALCULATED T AXIS, ECG11: 59 DEGREES
DIAGNOSIS, 93000: NORMAL
ECHO AO ROOT DIAM: 3.27 CM
ECHO LA AREA 4C: 12.17 CM2
ECHO LA VOL 2C: 43.07 ML (ref 22–52)
ECHO LA VOL 4C: 25.56 ML (ref 22–52)
ECHO LA VOL BP: 37.65 ML (ref 22–52)
ECHO LA VOL/BSA BIPLANE: 18.46 ML/M2 (ref 16–28)
ECHO LA VOLUME INDEX A2C: 21.11 ML/M2 (ref 16–28)
ECHO LA VOLUME INDEX A4C: 12.53 ML/M2 (ref 16–28)
ECHO LV E' LATERAL VELOCITY: 10.84 CM/S
ECHO LV E' SEPTAL VELOCITY: 9.08 CM/S
ECHO LV INTERNAL DIMENSION DIASTOLIC: 4.97 CM (ref 3.9–5.3)
ECHO LV INTERNAL DIMENSION SYSTOLIC: 3.93 CM
ECHO LV IVSD: 0.79 CM (ref 0.6–0.9)
ECHO LV MASS 2D: 144.2 G (ref 67–162)
ECHO LV MASS INDEX 2D: 70.7 G/M2 (ref 43–95)
ECHO LV POSTERIOR WALL DIASTOLIC: 0.9 CM (ref 0.6–0.9)
ECHO LVOT CARDIAC OUTPUT: 6.72 LITER/MINUTE
ECHO LVOT DIAM: 2 CM
ECHO LVOT PEAK GRADIENT: 6.75 MMHG
ECHO LVOT PEAK VELOCITY: 129.94 CM/S
ECHO LVOT SV: 86.3 ML
ECHO LVOT VTI: 27.53 CM
ECHO MV A VELOCITY: 85.65 CM/S
ECHO MV E DECELERATION TIME (DT): 184.92 MS
ECHO MV E VELOCITY: 82.16 CM/S
ECHO MV E/A RATIO: 0.96
ECHO MV E/E' LATERAL: 7.58
ECHO MV E/E' RATIO (AVERAGED): 8.31
ECHO MV E/E' SEPTAL: 9.05
ECHO RV TAPSE: 2.12 CM (ref 1.5–2)
EST. AVERAGE GLUCOSE BLD GHB EST-MCNC: 108 MG/DL
GLUCOSE BLD STRIP.AUTO-MCNC: 112 MG/DL (ref 70–110)
GLUCOSE BLD STRIP.AUTO-MCNC: 85 MG/DL (ref 70–110)
HBA1C MFR BLD: 5.4 % (ref 4.2–5.6)
INR PPP: 1.5 (ref 0.8–1.2)
LVOT MG: 3.51 MMHG
MAGNESIUM SERPL-MCNC: 2 MG/DL (ref 1.6–2.6)
P-R INTERVAL, ECG05: 150 MS
PHOSPHATE SERPL-MCNC: 3.6 MG/DL (ref 2.5–4.9)
PROTHROMBIN TIME: 17.5 SEC (ref 11.5–15.2)
Q-T INTERVAL, ECG07: 386 MS
QRS DURATION, ECG06: 78 MS
QTC CALCULATION (BEZET), ECG08: 419 MS
T4 FREE SERPL-MCNC: 1 NG/DL (ref 0.7–1.5)
TSH SERPL DL<=0.05 MIU/L-ACNC: 0.57 UIU/ML (ref 0.36–3.74)
VENTRICULAR RATE, ECG03: 71 BPM

## 2021-08-23 PROCEDURE — 71045 X-RAY EXAM CHEST 1 VIEW: CPT

## 2021-08-23 PROCEDURE — 74011250636 HC RX REV CODE- 250/636: Performed by: STUDENT IN AN ORGANIZED HEALTH CARE EDUCATION/TRAINING PROGRAM

## 2021-08-23 PROCEDURE — 83036 HEMOGLOBIN GLYCOSYLATED A1C: CPT

## 2021-08-23 PROCEDURE — 82962 GLUCOSE BLOOD TEST: CPT

## 2021-08-23 PROCEDURE — 99223 1ST HOSP IP/OBS HIGH 75: CPT | Performed by: STUDENT IN AN ORGANIZED HEALTH CARE EDUCATION/TRAINING PROGRAM

## 2021-08-23 PROCEDURE — 84443 ASSAY THYROID STIM HORMONE: CPT

## 2021-08-23 PROCEDURE — 84439 ASSAY OF FREE THYROXINE: CPT

## 2021-08-23 PROCEDURE — 74011250637 HC RX REV CODE- 250/637: Performed by: STUDENT IN AN ORGANIZED HEALTH CARE EDUCATION/TRAINING PROGRAM

## 2021-08-23 PROCEDURE — 99233 SBSQ HOSP IP/OBS HIGH 50: CPT | Performed by: INTERNAL MEDICINE

## 2021-08-23 PROCEDURE — 93306 TTE W/DOPPLER COMPLETE: CPT

## 2021-08-23 PROCEDURE — 74011250636 HC RX REV CODE- 250/636: Performed by: INTERNAL MEDICINE

## 2021-08-23 PROCEDURE — 74011250637 HC RX REV CODE- 250/637: Performed by: INTERNAL MEDICINE

## 2021-08-23 PROCEDURE — 84100 ASSAY OF PHOSPHORUS: CPT

## 2021-08-23 PROCEDURE — 74011000250 HC RX REV CODE- 250: Performed by: STUDENT IN AN ORGANIZED HEALTH CARE EDUCATION/TRAINING PROGRAM

## 2021-08-23 PROCEDURE — 65270000029 HC RM PRIVATE

## 2021-08-23 PROCEDURE — 83735 ASSAY OF MAGNESIUM: CPT

## 2021-08-23 PROCEDURE — 85610 PROTHROMBIN TIME: CPT

## 2021-08-23 RX ORDER — SODIUM CHLORIDE 0.9 % (FLUSH) 0.9 %
5-40 SYRINGE (ML) INJECTION AS NEEDED
Status: DISCONTINUED | OUTPATIENT
Start: 2021-08-23 | End: 2021-08-24 | Stop reason: HOSPADM

## 2021-08-23 RX ORDER — DEXTROSE 50 % IN WATER (D50W) INTRAVENOUS SYRINGE
25-50 AS NEEDED
Status: DISCONTINUED | OUTPATIENT
Start: 2021-08-23 | End: 2021-08-24 | Stop reason: HOSPADM

## 2021-08-23 RX ORDER — WARFARIN 6 MG/1
6 TABLET ORAL EVERY EVENING
Status: COMPLETED | OUTPATIENT
Start: 2021-08-23 | End: 2021-08-23

## 2021-08-23 RX ORDER — ACETAMINOPHEN 650 MG/1
650 SUPPOSITORY RECTAL
Status: DISCONTINUED | OUTPATIENT
Start: 2021-08-23 | End: 2021-08-24 | Stop reason: HOSPADM

## 2021-08-23 RX ORDER — METOPROLOL TARTRATE 25 MG/1
12.5 TABLET, FILM COATED ORAL 2 TIMES DAILY
Status: DISCONTINUED | OUTPATIENT
Start: 2021-08-23 | End: 2021-08-24 | Stop reason: HOSPADM

## 2021-08-23 RX ORDER — ONDANSETRON 4 MG/1
4 TABLET, ORALLY DISINTEGRATING ORAL
Status: DISCONTINUED | OUTPATIENT
Start: 2021-08-23 | End: 2021-08-24 | Stop reason: HOSPADM

## 2021-08-23 RX ORDER — CELECOXIB 100 MG/1
200 CAPSULE ORAL 2 TIMES DAILY
Status: DISCONTINUED | OUTPATIENT
Start: 2021-08-23 | End: 2021-08-24 | Stop reason: HOSPADM

## 2021-08-23 RX ORDER — POLYETHYLENE GLYCOL 3350 17 G/17G
17 POWDER, FOR SOLUTION ORAL DAILY PRN
Status: DISCONTINUED | OUTPATIENT
Start: 2021-08-23 | End: 2021-08-24 | Stop reason: HOSPADM

## 2021-08-23 RX ORDER — ONDANSETRON 2 MG/ML
4 INJECTION INTRAMUSCULAR; INTRAVENOUS
Status: DISCONTINUED | OUTPATIENT
Start: 2021-08-23 | End: 2021-08-24 | Stop reason: HOSPADM

## 2021-08-23 RX ORDER — CHOLESTYRAMINE 4 G/4.8G
4 POWDER, FOR SUSPENSION ORAL
Status: DISCONTINUED | OUTPATIENT
Start: 2021-08-23 | End: 2021-08-24 | Stop reason: HOSPADM

## 2021-08-23 RX ORDER — WARFARIN 7.5 MG/1
7.5 TABLET ORAL EVERY EVENING
Status: DISCONTINUED | OUTPATIENT
Start: 2021-08-23 | End: 2021-08-23

## 2021-08-23 RX ORDER — SODIUM CHLORIDE 0.9 % (FLUSH) 0.9 %
5-40 SYRINGE (ML) INJECTION EVERY 8 HOURS
Status: DISCONTINUED | OUTPATIENT
Start: 2021-08-23 | End: 2021-08-24 | Stop reason: HOSPADM

## 2021-08-23 RX ORDER — ACETAMINOPHEN 325 MG/1
650 TABLET ORAL
Status: DISCONTINUED | OUTPATIENT
Start: 2021-08-23 | End: 2021-08-24 | Stop reason: HOSPADM

## 2021-08-23 RX ORDER — CLONAZEPAM 0.5 MG/1
0.5 TABLET ORAL
COMMUNITY

## 2021-08-23 RX ORDER — PREGABALIN 100 MG/1
100 CAPSULE ORAL
COMMUNITY
End: 2021-08-24

## 2021-08-23 RX ORDER — EZETIMIBE 10 MG/1
10 TABLET ORAL DAILY
Status: DISCONTINUED | OUTPATIENT
Start: 2021-08-23 | End: 2021-08-24 | Stop reason: HOSPADM

## 2021-08-23 RX ORDER — CELECOXIB 200 MG/1
200 CAPSULE ORAL 2 TIMES DAILY
COMMUNITY
End: 2021-08-24

## 2021-08-23 RX ORDER — MAGNESIUM SULFATE 100 %
4 CRYSTALS MISCELLANEOUS AS NEEDED
Status: DISCONTINUED | OUTPATIENT
Start: 2021-08-23 | End: 2021-08-24 | Stop reason: HOSPADM

## 2021-08-23 RX ORDER — BUPROPION HYDROCHLORIDE 300 MG/1
300 TABLET ORAL DAILY
COMMUNITY

## 2021-08-23 RX ORDER — BUPROPION HYDROCHLORIDE 300 MG/1
300 TABLET ORAL DAILY
Status: DISCONTINUED | OUTPATIENT
Start: 2021-08-23 | End: 2021-08-24 | Stop reason: HOSPADM

## 2021-08-23 RX ORDER — ESOMEPRAZOLE MAGNESIUM 40 MG/1
40 CAPSULE, DELAYED RELEASE ORAL DAILY
COMMUNITY

## 2021-08-23 RX ORDER — INSULIN LISPRO 100 [IU]/ML
INJECTION, SOLUTION INTRAVENOUS; SUBCUTANEOUS
Status: DISCONTINUED | OUTPATIENT
Start: 2021-08-23 | End: 2021-08-24 | Stop reason: HOSPADM

## 2021-08-23 RX ORDER — PANTOPRAZOLE SODIUM 40 MG/1
40 TABLET, DELAYED RELEASE ORAL DAILY
COMMUNITY
End: 2021-08-24

## 2021-08-23 RX ORDER — EZETIMIBE 10 MG/1
10 TABLET ORAL
COMMUNITY

## 2021-08-23 RX ORDER — SODIUM CHLORIDE 9 MG/ML
75 INJECTION, SOLUTION INTRAVENOUS CONTINUOUS
Status: DISCONTINUED | OUTPATIENT
Start: 2021-08-23 | End: 2021-08-24 | Stop reason: HOSPADM

## 2021-08-23 RX ADMIN — ACETAMINOPHEN 650 MG: 325 TABLET ORAL at 06:46

## 2021-08-23 RX ADMIN — ACETAMINOPHEN 650 MG: 325 TABLET ORAL at 17:12

## 2021-08-23 RX ADMIN — SODIUM CHLORIDE 75 ML/HR: 900 INJECTION, SOLUTION INTRAVENOUS at 16:34

## 2021-08-23 RX ADMIN — EZETIMIBE 10 MG: 10 TABLET ORAL at 09:59

## 2021-08-23 RX ADMIN — Medication 10 ML: at 21:57

## 2021-08-23 RX ADMIN — CHOLESTYRAMINE 4 G: 4 POWDER, FOR SUSPENSION ORAL at 21:18

## 2021-08-23 RX ADMIN — WATER 1 G: 1 INJECTION INTRAMUSCULAR; INTRAVENOUS; SUBCUTANEOUS at 21:56

## 2021-08-23 RX ADMIN — SODIUM CHLORIDE 1000 ML: 900 INJECTION, SOLUTION INTRAVENOUS at 13:18

## 2021-08-23 RX ADMIN — BUPROPION HYDROCHLORIDE 300 MG: 300 TABLET, FILM COATED, EXTENDED RELEASE ORAL at 09:59

## 2021-08-23 RX ADMIN — Medication 10 ML: at 14:00

## 2021-08-23 RX ADMIN — Medication 10 ML: at 06:47

## 2021-08-23 RX ADMIN — CELECOXIB 200 MG: 100 CAPSULE ORAL at 09:59

## 2021-08-23 RX ADMIN — WARFARIN SODIUM 6 MG: 6 TABLET ORAL at 19:03

## 2021-08-23 RX ADMIN — Medication 10 ML: at 02:50

## 2021-08-23 RX ADMIN — METOPROLOL TARTRATE 12.5 MG: 25 TABLET, FILM COATED ORAL at 17:12

## 2021-08-23 NOTE — H&P
History & Physical    Patient: Sweta Barakat MRN: [id number]  CSN: [id number]    YOB: 1955  Age: 60 y.o. Sex: female      DOA: 8/22/2021    Chief Complaint:   Chief Complaint   Patient presents with    Dizziness    Fatigue          HPI:     Sweta Barakat is a 60 y.o.  female with hx of MI with stents, unspecified clotting disorder, aortic mural thrombus, diabetes mellitus, depression with anxiety. Patient presents with a somewhat unclear picture. Interviewed with  and daughter at bedside.  states that his wife thought about on Saturday and she was unable to assist him in helping her up due to weakness. The previous night they went out to dinner and patient seemed fine. However on Saturday she seemed weak and confused.  states at baseline, patient is very inactive. She spends most of her time in bed or playing computer games. He has not noticed any increased work of breathing with activity or any change in baseline mentation until Saturday. During my interview, daughter stated that patient had recently gotten Ativan. Patient sleeping and difficult to arouse. She will answer 1 or 2 questions and then promptly falls back to sleep. Past Medical History:   Diagnosis Date    Anemia     Aortic mural thrombus ([facility] [address] [address] 11/24/2014    on coumadin followed by dr Deena Quinn Aortic thrombus [facility]) 03/29/13    CAD (coronary artery disease)     Clot     Aortic clot    Clotting disorder (HCC)     Coronary atherosclerosis of native coronary artery     Stable, 18 mos. Since pce, will d/c effient    Diabetes mellitus     Gastrointestinal hemorrhage 6/25/2018    No clear etiology of GI bleed. Recent workup noted    Heart abnormalities     Hypercholesterolemia     Hypercoagulable state ([address] 6/25/2018    Maintain on Coumadin. INR followed by PCP.     Hyperlipidemia     Iron deficiency     MI (myocardial infarction) ([facility]     Old myocardial infarction     Post RCA PCI    Other and unspecified hyperlipidemia     On multiple meds, pt had side effect of 28 differnet meds, currently on lipofen and livalo    Postsurgical percutaneous transluminal coronary angioplasty status     stable    Postsurgical percutaneous transluminal coronary angioplasty status     Raynaud's disease     Screening for depression     Splenic infarction 13    Ulcer        Past Surgical History:   Procedure Laterality Date    CARDIAC SURG PROCEDURE UNLIST      FLEXIBLE SIGMOIDOSCOPY N/A 2018    SIGMOIDOSCOPY FLEXIBLE performed by Deysi Robles MD at [address] [facility]       delivery    HX COLONOSCOPY      HX CORONARY STENT PLACEMENT      RCA, VALDEZ    HX GYN      HX HEENT      HX HYSTERECTOMY      abdominal    HX OOPHORECTOMY      1 removed    HX ORTHOPAEDIC      right shoulder surgery    HX TONSILLECTOMY      CO LAP,APPENDECTOMY N/A 2019    Dr. Kourtney Figueroa       Family History   Problem Relation Age of Onset    Cancer Mother     Heart Disease Mother     Cancer Father     Hypertension Other         essential    Heart Attack Other         myocardial infarction    Other Other         Hypercholesterolemia    Cancer Sister        Social History     Socioeconomic History    Marital status:      Spouse name: Not on file    Number of children: Not on file    Years of education: Not on file    Highest education level: Not on file   Tobacco Use    Smoking status: Former Smoker     Packs/day: 2.50     Years: 40.00     Pack years: 100.00     Quit date: 2019     Years since quittin.6    Smokeless tobacco: Never Used    Tobacco comment: working on [employer]; chewing gum and using patch   Substance and Sexual Activity    Alcohol use: No     Comment: very rare    Drug use: No    Sexual activity: Not Currently     Partners: Male     Birth control/protection: None     Social Determinants of Health     Financial Resource Strain:     Difficulty of Paying Living Expenses:    Food Insecurity:     Worried About Running Out of Food in the Last Year:     [address] in the Last Year:    Transportation Needs:     Lack of Transportation (Medical):  Lack of Transportation (Non-Medical):    Physical Activity:     Days of Exercise per Week:     Minutes of Exercise per Session:    Stress:     Feeling of Stress :    Social Connections:     Frequency of Communication with Friends and Family:     Frequency of Social Gatherings with Friends and Family:     Attends Denominational Services:     Active Member of Clubs or Organizations:     Attends Club or Organization Meetings:     Marital Status:        Prior to Admission medications    Medication Sig Start Date End Date Taking? Authorizing Provider   buPROPion XL (WELLBUTRIN XL) 300 mg XL tablet Take 300 mg by mouth daily. Indications: anxiousness associated with depression   Yes Provider, Historical   clonazePAM (KlonoPIN) 0.5 mg tablet Take 0.5 mg by mouth every twelve (12) hours as needed for Anxiety. Yes Provider, Historical   celecoxib (CELEBREX) 200 mg capsule Take 200 mg by mouth two (2) times a day. Yes Provider, Historical   ezetimibe (ZETIA) 10 mg tablet Take 10 mg by mouth. Yes Provider, Historical   esomeprazole (NexIUM) 40 mg capsule Take 40 mg by mouth daily. Yes Provider, Historical   pantoprazole (PROTONIX) 40 mg tablet Take 40 mg by mouth daily. Yes Provider, Historical   pregabalin (LYRICA) 100 mg capsule Take 100 mg by mouth nightly. Yes Provider, Historical   oxyCODONE-acetaminophen (PERCOCET) 5-325 mg per tablet Take  by mouth every four (4) hours as needed for Pain. Provider, Historical   calcium polycarbophil (FIBERCON PO) Take  by mouth. Provider, Historical   glimepiride (AMARYL) 2 mg tablet Take  by mouth every morning. Provider, Historical   torsemide (DEMADEX) 20 mg tablet Take  by mouth daily.     Provider, Historical   pioglitazone (ACTOS) 30 mg tablet Take  by mouth daily. Provider, Historical   metoprolol tartrate (LOPRESSOR) 25 mg tablet Take 0.5 Tabs by mouth two (2) times a day. 1/24/17   Amando Agosto MD   co-enzyme Q-10 (CO Q-10) 100 mg capsule Take 100 mg by mouth daily. Provider, Historical   senna-docusate (PERICOLACE) 8.6-50 mg per tablet Take 1 Tab by mouth daily. Provider, Historical   pitavastatin (LIVALO) 4 mg tab tablet Take 4 mg by mouth daily. Provider, Historical   traZODone (DESYREL) 100 mg tablet Take 100 mg by mouth nightly. Provider, Historical   warfarin (COUMADIN) 7.5 mg tablet Take 7.5 mg by mouth once as needed (6 mg x1 day, 6 mg x 1 day, 5 mg x1 day then repeat). Other, MD Martin   metFORMIN (GLUCOPHAGE) 500 mg tablet Take 500 mg by mouth daily (with breakfast). Other, MD Martin   CALCIUM CARBONATE (CALCIUM 600) 1,500 (600) mg Tab Take 500 mg by mouth once. Provider, Historical   cyanocobalamin (VITAMIN B-12) 2,000 mcg TbER Take 2,000 mcg by mouth once. Provider, Historical   doxycycline hyclate (DORYX) 75 mg TbEC Take 100 mg by mouth daily. Provider, Historical   omega-3 fatty acids-vitamin e (FISH OIL) 1,000 mg Cap Take 1 Cap by mouth daily. Provider, Historical   cholecalciferol, vitamin d3, (VITAMIN D) 1,000 unit tablet Take  by mouth daily. Provider, Historical       Allergies   Allergen Reactions    Latex Other (comments)     Blisters and bleeding from blisters.  Nitroglycerin Other (comments)     Patient states \"that when given medication last time she went into arrest\"    Morphine Shortness of Breath    Sulfa (Sulfonamide Antibiotics) Nausea Only    Tetanus Vaccines And Toxoid Swelling and Other (comments)     fever         Review of Systems  GENERAL: No fever, chills, +malaise  HEENT: No sore throat or sinus congestion. NECK: No pain or stiffness. PULMONARY: No shortness of breath, cough or wheeze.    Cardiovascular: no CP  GASTROINTESTINAL: No abdominal pain, nausea, vomiting or diarrhea  GENITOURINARY: No urinary frequency, urgency, hesitancy or dysuria. MUSCULOSKELETAL: +back pain   DERMATOLOGIC: No rash, no itching, no lesions. ENDOCRINE:  No recent change in weight. HEMATOLOGICAL: No anemia or easy bruising or bleeding. NEUROLOGIC: +headache      Physical Exam:     Physical Exam:  Visit Vitals  BP (!) 110/50   Pulse 94   Temp (!) 100.6 °F (38.1 °C)   Resp 10   Ht 5' 8\" (1.727 m)   Wt 90.7 kg (200 lb)   SpO2 94%   BMI 30.41 kg/m²      O2 Device: None (Room air)    Temp (24hrs), Av.3 °F (37.9 °C), Min:99.9 °F (37.7 °C), Max:100.6 °F (38.1 °C)    No intake/output data recorded. No intake/output data recorded. General:  Sleepy, nods off during conversation, no distress, appears stated age. Head: Normocephalic, without obvious abnormality, atraumatic. Eyes:  Conjunctivae/corneas clear. PERRL, EOMs intact. Nose: Nares normal.   Neck: Supple, symmetrical, trachea midline   Lungs:   Clear to auscultation bilaterally. Heart:  Regular rate and rhythm, S1, S2 normal.     Abdomen: Soft, non-tender. No suprapubic tenderness    Extremities: Extremities normal, atraumatic, no cyanosis or edema. Pulses: 2+ and symmetric all extremities. Skin:  No rashes or lesions   Neurologic: AAOx2, No focal motor or sensory deficit. Labs Reviewed: All lab results for the last 24 hours reviewed. Recent Results (from the past 24 hour(s))   CBC WITH AUTOMATED DIFF    Collection Time: 21  7:55 AM   Result Value Ref Range    WBC 8.1 4.6 - 13.2 K/uL    RBC 3.57 (L) 4.20 - 5.30 M/uL    HGB 10.9 (L) 12.0 - 16.0 g/dL    HCT 33.0 (L) 35.0 - 45.0 %    MCV 92.4 74.0 - 97.0 FL    MCH 30.5 24.0 - 34.0 PG    MCHC 33.0 31.0 - 37.0 g/dL    RDW 13.8 11.6 - 14.5 %    PLATELET 519 895 - 445 K/uL    MPV 10.3 9.2 - 11.8 FL    NEUTROPHILS 72 40 - 73 %    LYMPHOCYTES 18 (L) 21 - 52 %    MONOCYTES 9 3 - 10 %    EOSINOPHILS 0 0 - 5 %    BASOPHILS 0 0 - 2 %    ABS. NEUTROPHILS 5.8 1.8 - 8.0 K/UL    ABS. LYMPHOCYTES 1.4 0.9 - 3.6 K/UL    ABS. MONOCYTES 0.8 0.05 - 1.2 K/UL    ABS. EOSINOPHILS 0.0 0.0 - 0.4 K/UL    ABS. BASOPHILS 0.0 0.0 - 0.1 K/UL    DF AUTOMATED     METABOLIC PANEL, COMPREHENSIVE    Collection Time: 08/22/21  7:55 AM   Result Value Ref Range    Sodium 137 136 - 145 mmol/L    Potassium 4.3 3.5 - 5.5 mmol/L    Chloride 105 100 - 111 mmol/L    CO2 28 21 - 32 mmol/L    Anion gap 4 3.0 - 18 mmol/L    Glucose 99 74 - 99 mg/dL    BUN 15 7.0 - 18 MG/DL    Creatinine 1.17 0.6 - 1.3 MG/DL    BUN/Creatinine ratio 13 12 - 20      GFR est AA 56 (L) >60 ml/min/1.73m2    GFR est non-AA 47 (L) >60 ml/min/1.73m2    Calcium 8.1 (L) 8.5 - 10.1 MG/DL    Bilirubin, total 0.4 0.2 - 1.0 MG/DL    ALT (SGPT) 25 13 - 56 U/L    AST (SGOT) 25 10 - 38 U/L    Alk.  phosphatase 63 45 - 117 U/L    Protein, total 6.3 (L) 6.4 - 8.2 g/dL    Albumin 3.1 (L) 3.4 - 5.0 g/dL    Globulin 3.2 2.0 - 4.0 g/dL    A-G Ratio 1.0 0.8 - 1.7     CARDIAC PANEL,(CK, CKMB & TROPONIN)    Collection Time: 08/22/21  7:55 AM   Result Value Ref Range    CK - MB <1.0 <3.6 ng/ml    CK-MB Index  0.0 - 4.0 %     CALCULATION NOT PERFORMED WHEN RESULT IS BELOW LINEAR LIMIT    CK 68 26 - 192 U/L    Troponin-I, QT <0.02 0.0 - 0.045 NG/ML   EKG, 12 LEAD, INITIAL    Collection Time: 08/22/21  8:18 AM   Result Value Ref Range    Ventricular Rate 71 BPM    Atrial Rate 71 BPM    P-R Interval 150 ms    QRS Duration 78 ms    Q-T Interval 386 ms    QTC Calculation (Bezet) 419 ms    Calculated P Axis 22 degrees    Calculated R Axis 45 degrees    Calculated T Axis 59 degrees    Diagnosis       Normal sinus rhythm  Normal ECG  When compared with ECG of 14-NOV-2014 23:44,  No significant change was found     URINALYSIS W/ RFLX MICROSCOPIC    Collection Time: 08/22/21  9:06 PM   Result Value Ref Range    Color YELLOW      Appearance CLOUDY      Specific gravity >1.030 (H) 1.005 - 1.030    pH (UA) 8.5 (H) 5.0 - 8.0      Protein TRACE (A) NEG mg/dL    Glucose Negative NEG mg/dL    Ketone Negative NEG mg/dL    Bilirubin Negative NEG      Blood SMALL (A) NEG      Urobilinogen 0.2 0.2 - 1.0 EU/dL    Nitrites Positive (A) NEG      Leukocyte Esterase Negative NEG     COVID-19 RAPID TEST    Collection Time: 08/22/21  9:06 PM   Result Value Ref Range    Specimen source Nasopharyngeal      COVID-19 rapid test Not detected NOTD     URINE MICROSCOPIC ONLY    Collection Time: 08/22/21  9:06 PM   Result Value Ref Range    WBC 0 to 3 0 - 4 /hpf    RBC 0 to 3 0 - 5 /hpf    Epithelial cells 1+ 0 - 5 /lpf    Bacteria 2+ (A) NEG /hpf        XR Results (most recent):  Results from Hospital Encounter encounter on 07/25/19    XR KNEE RT 3 V    Narrative  EXAM:  XR KNEE RT 3 V    INDICATION:   pain    COMPARISON: None. FINDINGS: Three views of the right knee demonstrate no fracture or dislocation. Joint spaces are preserved. Quadriceps tendon enthesophyte. No significant  joint effusion or soft tissue swelling. Impression  IMPRESSION:    No acute osseous abnormality. CT Results  (Last 48 hours)               08/22/21 1032  CTA HEAD NECK W CONT Final result    Impression:  1. Severe stenosis at the origin left vertebral artery. 2.  No intracranial occlusion or hemodynamically significant stenosis   identified. 3.  Mild atherosclerotic disease of the bilateral carotid bifurcations resulting   in approximately 20% stenosis of the right internal carotid artery. Narrative:  EXAM: CTA HEAD NECK W CONT        CLINICAL INDICATIONS: sudden onest dizziness, concern for vertebral artery   pathology       TECHNIQUE: Helical CT scan of the brain and neck were performed at 1.25 mm   intervals during rapid IV bolus contrast administration. These data were   reconstructed at .625 mm intervals for vascular analysis. The data was also   reviewed at 2.5 mm intervals for accompanying soft tissue analysis.  3D post   processed images, including surface shaded displays, were produced for this exam   on independent console, permanently archived and interpreted. All CT scans at this facility are performed using dose optimization technique as   appropriate to a performed exam, to include automated exposure control,   adjustment of the MA and/or kV according to patient size (including appropriate   matching for site-specific examinations) or use of  iterative reconstruction   technique. CONTRAST: 100 mL Isovue 370       COMPARISON: Head CT August 22, 2021 at 8:03 AM       CTA NECK VASCULAR ANALYSIS:        Aortic arch: Left sided with typical configuration. Proximal branch vessels are   within normal limits. Right carotid:   -CCA: Patent   -ECA: Patent   -ICA: Patient with proximal atherosclerotic disease resulting in approximately   20% stenosis of proximal ICA by NASCET criteria. Medial retropharyngeal course   at the level of the piriform sinuses and oropharynx. Left carotid:   -CCA: Patent   -ECA: Patent   -ICA: Patient with proximal atherosclerotic disease resulting in approximately   0% stenosis of proximal ICA by NASCET criteria. Medial retropharyngeal course of   the level of the piriform sinus and oropharynx       Right vertebral: Patent   Left vertebral: Severe stenosis at the origin with poststenotic dilation. CTA HEAD VASCULAR ANALYSIS:        Anterior circulation:   -ICA: Patent   -COSME: Diminutive right COSME with dominant left COSME in an partial azygous   configuration. -MCA: Patent   -AComm: Unremarkable   -PComm: Patient on the right, diminutive/hypoplastic on the left       Posterior circulation:   -RVA: Patent   -LVA: Patent   -Basilar: Patent   -Right PCA: Diminutive right P1 with prominent right PCA (fetal type)   -Left PCA: Patent       Major dural venous sinuses: Unremarkable       CTA SOFT TISSUE ANALYSIS:   Lungs: Dependent atelectatic change. Scarring at the bilateral lung apices.    Upper chest: Unremarkable Neck: Unremarkable   Lymph nodes: Prominent right paratracheal lymph node measuring up to 7 mm. Orbits: Unremarkable   Paranasal sinuses: Clear   Brain: No hemorrhage or mass effect. Bones: Unremarkable for age. 08/22/21 0811  CT HEAD WO CONT Final result    Impression:      No acute intracranial abnormality. Please note that MR is more sensitive for early acute ischemia and may add more   information for persistent concern. Discussed with Hamlet Taylor at 0815 hours. Narrative:  INDICATION: Vertigo, confusion, CODE S       COMPARISON: 7/19       TECHNIQUE: Unenhanced CT of the head was performed using 5 mm images. Brain and   bone windows were generated. All CT scans at this facility are performed using   doze optimization technique as appropriate to a performed exam, to include   automated exposure control, adjustment of the mA and/or KV according to patient   size (including appropriate matching for site specific examinations), or use of   iterative reconstruction technique. FINDINGS:       The ventricles and sulci are normal in size, shape and configuration and   midline. There is no significant white matter disease. There is no intracranial   hemorrhage, extra-axial collection, mass, mass effect or midline shift. The   basilar cisterns are open. No acute infarct is identified. The bone windows   demonstrate no abnormalities. The visualized portions of the paranasal sinuses   and mastoid air cells are clear. Procedures/imaging: see electronic medical records for all procedures/Xrays and details which were not copied into this note but were reviewed prior to creation of Plan      Assessment/Plan     Active Problems:    UTI (urinary tract infection) (8/22/2021)      AMS (altered mental status) (8/22/2021)       Assessment  1. Altered Mental status  2. UTI  3. Polypharmacy  4. Severe stenosis at the origin left vertebral artery.           #4-0: Uncertain to cause of altered mental status. Patient's UA shows 2+ bacteria and positive nitrites. However upon my interview she denies symptoms of dysuria or urinary frequency. Code stroke was called in emergency department. No acute infarct was found. However there was a finding of stenosis in the left vertebral artery. Consider neurology consult. Patient on multiple sedating medications.  describes similar behavioral disturbance when she was taking Lyrica 50 mg BID; changed to Lyrica 100 at bedtime to mitigate this by PCP. Will restart minimal medications. Continue ceftriaxone. 5.   Coagulopathy, on Warfarin  6. H/o Aortic thrombus, resolved         #5,6: continue warfarin. INR goal 2.5-3.5. Pharmacy to dose. 7.   Type 2 Diabetes mellitus        #7: SSI + glucose checks ACHS     Plan  1. Monitor vital signs, weight per unit protocol  2. PT, OT eval and treat  3. Consult CM to assist w/ dc planning      Diet: adult regular   DVT/GI Prophylaxis: Warfarin   Code Status: Full    Contact: Linda Gray () [phone]    Discussed with patient at bedside about hospital admission and plan care. He understands and agrees. All questions answered. Disclaimer: Sections of this note are dictated using utilizing voice recognition software. Minor typographical errors may be present. If questions arise, please do not hesitate to contact me or call our department.         Alba Meneses, [facility]

## 2021-08-23 NOTE — PROGRESS NOTES
Pharmacist Consult: Warfarin Management    Assessment/Plan  INR is 1.5  Will give warfarin 6 mg as a one-time dose. Recheck INR daily and adjust dose accordingly. Will continue to follow INR trend and patient's clinical progress daily. Subjective/Objective  Patient on warfarin therapy for aortic thrombosis history  Target goal INR of 2.5- 3.5  Patient is has been on warfarin PTA. Patient's home dose is 5 mg daily, per [facility] with [address] ([phone]). Last filled on 07/31/21. Before that filled in March. Per patient struggles with keeping goal INR of 2.5-3.5 (non-compliant per RX history).      Date 8/23         INR 1.5         Warfarin Dose (mg) 6             Bleeding/Sensitivity Risk Factors: Hx variable INRs, Hx GIB, and Hypertension  Potentially interacting medications include:   Drugs that may increase INR: None  Drugs that may decrease INR: None  Other anticoagulants include: NSAID (celebrex)    Recent Labs  Coags Lab Results   Component Value Date/Time    PTP 17.5 (H) 08/23/2021 04:57 AM    INR 1.5 (H) 08/23/2021 04:57 AM      CBC No results found for: WBC, HGB, HCT, PLT, HGBEXT, HCTEXT, PLTEXT   Occult Blood No results found for: OB1, OB2, OB3, OCBL, POCOBL, SOB, OCCBLDEXT       Thank you,  JEANNE Donahue  8/23/2021

## 2021-08-23 NOTE — ED NOTES
Per pt, \"I tried to use the bed pan, but it overflowed. I made a mess everywhere. \" Changed pt's linen and placed in hospital gown. Pt also complained of having a headache. Administered tylenol. Pt stated before she came to the hospital that she couldn't walk. Previously while helping pt she had a hard time sitting up and moving herself. Pt was able to sit up with assistance this morning. Still complains of back pain, but feel she is doing better.

## 2021-08-23 NOTE — ED NOTES
Pt in bed with  at bedside. Pt was sleeping. Requested that pt lay on her back so that she can take tylenol. Pt complained of her back hurting when she lays on her back. Advised pt that the tylenol should help with that pain as well as her fever. Moved pt up in bed. Pt now resting quietly. Will continue to monitor.

## 2021-08-23 NOTE — ED NOTES
Pt now has a fever of 100.6. Dr. Va Hardy made aware. Pt complains of feeling weak. Pt urinated on herself. Changed pt's bed line and applied a brief.  at bedside.

## 2021-08-23 NOTE — ED NOTES
ED Course as of Aug 22 2102   Harleen Hull Aug 22, 2021   9031 Signed out to me, at this point we are still awaiting MRI interpretation. I did call [facility] to try to expedite.    [CB]      ED Course User Index  [CB] Troy Connolly MD     9:02 PM MRI results nothing acute. Nurse tells me she now has a fever 100.6. Saw and evaluated the patient single spurious low blood pressure number because she was messing with the cough others have been stable. She does appear tired and a bit uncomfortable. Will obtain new urine sample and coronavirus swab.   Signed out to Dr. Brent Segal to follow

## 2021-08-23 NOTE — ED NOTES
Pt was in bed sleeping. Easily able to awake. Denies any pain at this time. Temperature is now 98.4. Will continue to monitor pt.

## 2021-08-23 NOTE — PROGRESS NOTES
Progress Note    Patient: Leonard Muhammad MRN: [id number]  CSN: [id number]    YOB: 1955  Age: 60 y.o. Sex: female    DOA: 8/22/2021 LOS:  LOS: 1 day                    Subjective: Reports feeling tired but better than overnight. Denies shortness of breath, chest pain, or fever/chills     Chief Complaint:   Chief Complaint   Patient presents with    Dizziness    Fatigue       Review of systems  General: No fevers or chills. Cardiovascular: No chest pain or pressure. No palpitations. Pulmonary: No shortness of breath, cough or wheeze. Gastrointestinal: No abdominal pain, nausea, vomiting or diarrhea. Genitourinary: No urinary frequency, urgency, hesitancy or dysuria. Musculoskeletal: No joint or muscle pain, no back pain, no recent trauma. Neurologic: No headache, numbness, tingling or weakness. Objective:     Physical Exam:  Visit Vitals  BP (!) (P) 95/40 (BP 1 Location: Right upper arm, BP Patient Position: Lying left side)   Pulse (P) 74   Temp (P) 98.9 °F (37.2 °C)   Resp (P) 19   Ht 5' 8\" (1.727 m)   Wt 90.7 kg (200 lb)   SpO2 90%   BMI 30.41 kg/m²        General:         Alert, cooperative, no acute distress    HEENT: NC, Atraumatic. PERRLA, anicteric sclerae. Lungs: CTA Bilaterally. No Wheezing/Rhonchi/Rales. Heart:  Regular  rhythm,  No murmur, No Rubs, No Gallops  Abdomen: Soft, Non distended, Non tender.  +Bowel sounds, no HSM  Extremities: No c/c/e  Psych:   Good insight. Not anxious or agitated. Neurologic:  CN 2-12 grossly intact, Alert and oriented X 3. No acute neurological                                 Deficits,     Intake and Output:  Current Shift:  No intake/output data recorded. Last three shifts:  No intake/output data recorded.     Labs: Results:       Chemistry Recent Labs     08/22/21  0755   GLU 99      K 4.3      CO2 28   BUN 15   CREA 1.17   CA 8.1*   AGAP 4   BUCR 13   AP 63   TP 6.3*   ALB 3.1*   GLOB 3.2   AGRAT 1.0      CBC w/Diff Recent Labs     08/22/21  0755   WBC 8.1   RBC 3.57*   HGB 10.9*   HCT 33.0*      GRANS 72   LYMPH 18*   EOS 0      Cardiac Enzymes Recent Labs     08/22/21  0755   CPK 68   CKND1 CALCULATION NOT PERFORMED WHEN RESULT IS BELOW LINEAR LIMIT      Coagulation Recent Labs     08/23/21  0457   PTP 17.5*   INR 1.5*       Lipid Panel Lab Results   Component Value Date/Time    Cholesterol, total 338 (H) 04/25/2011 04:35 AM    HDL Cholesterol 30 (L) 04/25/2011 04:35 AM    LDL, calculated  04/25/2011 04:35 AM     LDL AND VLDL CHOLESTEROL NOT CALCULATED WHEN TRIGLYCERIDES >400 MG/DL OR HDL CHOLESTEROL <20 MG/DL    VLDL, calculated  04/25/2011 04:35 AM     Calculation not valid with this patient's other Lipid values. Triglyceride 810 (H) 04/25/2011 04:35 AM    CHOL/HDL Ratio 11.3 (H) 04/25/2011 04:35 AM      BNP No results for input(s): BNPP in the last 72 hours.    Liver Enzymes Recent Labs     08/22/21  0755   TP 6.3*   ALB 3.1*   AP 63      Thyroid Studies Lab Results   Component Value Date/Time    TSH 0.57 08/23/2021 04:57 AM          Procedures/imaging: see electronic medical records for all procedures/Xrays and details which were not copied into this note but were reviewed prior to creation of Plan    Medications:   Current Facility-Administered Medications   Medication Dose Route Frequency    buPROPion XL (WELLBUTRIN XL) tablet 300 mg  300 mg Oral DAILY    celecoxib (CELEBREX) capsule 200 mg  200 mg Oral BID    ezetimibe (ZETIA) tablet 10 mg  10 mg Oral DAILY    metoprolol tartrate (LOPRESSOR) tablet 12.5 mg  12.5 mg Oral BID    sodium chloride (NS) flush 5-40 mL  5-40 mL IntraVENous Q8H    sodium chloride (NS) flush 5-40 mL  5-40 mL IntraVENous PRN    acetaminophen (TYLENOL) tablet 650 mg  650 mg Oral Q6H PRN    Or    acetaminophen (TYLENOL) suppository 650 mg  650 mg Rectal Q6H PRN    polyethylene glycol (MIRALAX) packet 17 g  17 g Oral DAILY PRN    ondansetron (ZOFRAN ODT) tablet 4 mg  4 mg Oral Q8H PRN    Or    ondansetron (ZOFRAN) injection 4 mg  4 mg IntraVENous Q6H PRN    Warfarin - Pharmacy to Dose   Other Rx Dosing/Monitoring    insulin lispro (HUMALOG) injection   SubCUTAneous AC&HS    glucose chewable tablet 16 g  4 Tablet Oral PRN    glucagon (GLUCAGEN) injection 1 mg  1 mg IntraMUSCular PRN    dextrose (D50W) injection syrg 12.5-25 g  25-50 mL IntraVENous PRN    cefTRIAXone (ROCEPHIN) 1 g in sterile water (preservative free) 10 mL IV syringe  1 g IntraVENous Q24H     Current Outpatient Medications   Medication Sig    buPROPion XL (WELLBUTRIN XL) 300 mg XL tablet Take 300 mg by mouth daily. Indications: anxiousness associated with depression    clonazePAM (KlonoPIN) 0.5 mg tablet Take 0.5 mg by mouth every twelve (12) hours as needed for Anxiety.  celecoxib (CELEBREX) 200 mg capsule Take 200 mg by mouth two (2) times a day.  ezetimibe (ZETIA) 10 mg tablet Take 10 mg by mouth.  esomeprazole (NexIUM) 40 mg capsule Take 40 mg by mouth daily.  pantoprazole (PROTONIX) 40 mg tablet Take 40 mg by mouth daily.  pregabalin (LYRICA) 100 mg capsule Take 100 mg by mouth nightly.  oxyCODONE-acetaminophen (PERCOCET) 5-325 mg per tablet Take  by mouth every four (4) hours as needed for Pain.  calcium polycarbophil (FIBERCON PO) Take  by mouth.  glimepiride (AMARYL) 2 mg tablet Take  by mouth every morning.  torsemide (DEMADEX) 20 mg tablet Take  by mouth daily.  pioglitazone (ACTOS) 30 mg tablet Take  by mouth daily.  metoprolol tartrate (LOPRESSOR) 25 mg tablet Take 0.5 Tabs by mouth two (2) times a day.  co-enzyme Q-10 (CO Q-10) 100 mg capsule Take 100 mg by mouth daily.  senna-docusate (PERICOLACE) 8.6-50 mg per tablet Take 1 Tab by mouth daily.  pitavastatin (LIVALO) 4 mg tab tablet Take 4 mg by mouth daily.  traZODone (DESYREL) 100 mg tablet Take 100 mg by mouth nightly.     warfarin (COUMADIN) 7.5 mg tablet Take 7.5 mg by mouth once as needed (6 mg x1 day, 6 mg x 1 day, 5 mg x1 day then repeat).  metFORMIN (GLUCOPHAGE) 500 mg tablet Take 500 mg by mouth daily (with breakfast).  CALCIUM CARBONATE (CALCIUM 600) 1,500 (600) mg Tab Take 500 mg by mouth once.  cyanocobalamin (VITAMIN B-12) 2,000 mcg TbER Take 2,000 mcg by mouth once.  doxycycline hyclate (DORYX) 75 mg TbEC Take 100 mg by mouth daily.  omega-3 fatty acids-vitamin e (FISH OIL) 1,000 mg Cap Take 1 Cap by mouth daily.  cholecalciferol, vitamin d3, (VITAMIN D) 1,000 unit tablet Take  by mouth daily. Assessment/Plan     1. Metabolic encephalopathy: Likely multifactorial 2/2 polypharmacy [recent Lyrica adjustment] as well as evidence of UTI on urinalysis. MRI negative for acute stroke. -Continue ceftriaxone 1g q24hrs  -Hold lyrica   -TTE given episodes of syncope. Last TTE 2018 notable for EF 55%, no valvular pathology. 2. History of aortic thrombus: AC with Coumadin; reports struggle with maintaining goal INR 2.5-3.5  -Pharmacy to dose warfarin  3. Severe vertebral artery stenosis: Prominent R PCA with good collateral circulation. Otherwise, mild 20% ICA stenosis  -Conservative blood pressure management  -Aggressive secondary risk factor modification.          Active Problems:    UTI (urinary tract infection) (8/22/2021)      AMS (altered mental status) (8/22/2021)          Case discussed with:  [x]Patient  [x]Family  []Nursing  []Case Management  DVT Prophylaxis:  []Lovenox  []Hep SQ  []SCDs  [x]Coumadin   []On Heparin gtt    Temo Womack MD  8/23/2021 12:47 PM

## 2021-08-23 NOTE — ED NOTES
Patient care assumed from Dr. Duane Kemps at [phone]. Patient had a fever of 100.6. Was initially pending Covid swab as well as UA. It does appear that she does have a urinary tract infection. This could account for his symptoms. I discussed the findings with the patient and family. The patient does seem to be altered currently. Only oriented x2. Patient's  at bedside states that she has been very weak and very confused not remembering where she is. Given this, patient will need to be admitted for further management. She will be given antibiotics, Tylenol, and IV fluids. Discussed with Dr. Do Rob, who will admit

## 2021-08-23 NOTE — PROGRESS NOTES
SUBJECTIVE:    Patient states she is doing fine. Having some loose bowel movement. Having some leg pain which has been chronic and also has tingling numbness and was recently started on Lyrica. She says she passed out yesterday. She has multiple falls in the last few months. She has chronic back pain. Denies any chest pain shortness of breath or cough. She also had a low-grade fever. Patient  is at bedside. Patient is a poor historian otherwise. OBJECTIVE:    BP (!) (P) 95/40 (BP 1 Location: Right upper arm, BP Patient Position: Lying left side)   Pulse (P) 74   Temp (P) 98.9 °F (37.2 °C)   Resp (P) 19   Ht 5' 8\" (1.727 m)   Wt 90.7 kg (200 lb)   SpO2 90%   BMI 30.41 kg/m²     General appearance -awake, follows commands appropriately, not in acute distress  Eyes - sclera anicteric, no pallor  Nose - no obvious nasal discharge. Neck - supple, no JVD, trachea is midline  Chest -clear air entry noted in bases, no wheezes  Heart - S1 and S2 normal  Abdomen - soft, nontender, nondistended, Bowel sounds present  Neurological - alert, oriented, normal speech, no focal findings noted, increased sensitivity and reduced sensation in both lower extremity especially in feet. Musculoskeletal - no joint tenderness or swelling of knees bilaterally  Extremities - no pedal edema noted    ASSESSMENT:    1. Recurrent falls  2. Syncope could be from polyneuropathy versus orthostatic versus subclavian steal syndrome versus sepsis  3. Low-grade fever  4. History of aortic thrombus  5. Dyslipidemia  6. Coronary artery disease and history of MI  7. History of GI bleed    PLAN:    CT head and MRI brain report reviewed  CT head and neck report reviewed and there is no mention of aortic blood clot. We will check serial cardiac enzymes, EKGs and telemetry. Echocardiogram is pending. Continue holding Lyrica at this point.   Start Questran for loose bowel movement  We will check urine culture, chest x-ray and blood culture  Continue statin for dyslipidemia  Continue antibiotic empirically. Check cortisol level  PT and OT to follow this patient    Total time to take care of this patient was 35 minutes and more than 50% of time was spent counseling and coordinating care. Disclaimer: Sections of this note are dictated using utilizing voice recognition software, which may have resulted in some phonetic based errors in grammar and contents. Even though attempts were made to correct all the mistakes, some may have been missed, and remained in the body of the document. If questions arise, please contact our department.     Recent Results (from the past 24 hour(s))   URINALYSIS W/ RFLX MICROSCOPIC    Collection Time: 08/22/21  9:06 PM   Result Value Ref Range    Color YELLOW      Appearance CLOUDY      Specific gravity >1.030 (H) 1.005 - 1.030    pH (UA) 8.5 (H) 5.0 - 8.0      Protein TRACE (A) NEG mg/dL    Glucose Negative NEG mg/dL    Ketone Negative NEG mg/dL    Bilirubin Negative NEG      Blood SMALL (A) NEG      Urobilinogen 0.2 0.2 - 1.0 EU/dL    Nitrites Positive (A) NEG      Leukocyte Esterase Negative NEG     COVID-19 RAPID TEST    Collection Time: 08/22/21  9:06 PM   Result Value Ref Range    Specimen source Nasopharyngeal      COVID-19 rapid test Not detected NOTD     URINE MICROSCOPIC ONLY    Collection Time: 08/22/21  9:06 PM   Result Value Ref Range    WBC 0 to 3 0 - 4 /hpf    RBC 0 to 3 0 - 5 /hpf    Epithelial cells 1+ 0 - 5 /lpf    Bacteria 2+ (A) NEG /hpf   TSH 3RD GENERATION    Collection Time: 08/23/21  4:57 AM   Result Value Ref Range    TSH 0.57 0.36 - 3.74 uIU/mL   T4, FREE    Collection Time: 08/23/21  4:57 AM   Result Value Ref Range    T4, Free 1.0 0.7 - 1.5 NG/DL   MAGNESIUM    Collection Time: 08/23/21  4:57 AM   Result Value Ref Range    Magnesium 2.0 1.6 - 2.6 mg/dL   PHOSPHORUS    Collection Time: 08/23/21  4:57 AM   Result Value Ref Range    Phosphorus 3.6 2.5 - 4.9 MG/DL   PROTHROMBIN TIME + INR Collection Time: 08/23/21  4:57 AM   Result Value Ref Range    Prothrombin time 17.5 (H) 11.5 - 15.2 sec    INR 1.5 (H) 0.8 - 1.2     HEMOGLOBIN A1C WITH EAG    Collection Time: 08/23/21  4:57 AM   Result Value Ref Range    Hemoglobin A1c 5.4 4.2 - 5.6 %    Est. average glucose 108 mg/dL   GLUCOSE, POC    Collection Time: 08/23/21 12:00 PM   Result Value Ref Range    Glucose (POC) 112 (H) 70 - 110 mg/dL   ECHO ADULT COMPLETE    Collection Time: 08/23/21  3:06 PM   Result Value Ref Range    IVSd 0.79 0.60 - 0.90 cm    LVIDd 4.97 3.90 - 5.30 cm    LVIDs 3.93 cm    LVOT d 2.00 cm    LVPWd 0.90 0.60 - 0.90 cm    LVOT Cardiac Output 6.72 liter/minute    LVOT Peak Gradient 6.75 mmHg    Left Ventricular Outflow Tract Mean Gradient 3.51 mmHg    LVOT SV 86.3 mL    LVOT Peak Velocity 129.94 cm/s    LVOT VTI 27.53 cm    LA Volume 37.65 22.0 - 52.0 mL    LA Area 4C 12.17 cm2    LA Vol 2C 43.07 22.00 - 52.00 mL    LA Vol 4C 25.56 22.00 - 52.00 mL    MV A Jose 85.65 cm/s    Mitral Valve E Wave Deceleration Time 184.92 ms    MV E Jose 82.16 cm/s    E/E' lateral 7.58     E/E' septal 9.05     LV E' Lateral Velocity 10.84 cm/s    LV E' Septal Velocity 9.08 cm/s    Tapse 2.12 (A) 1.50 - 2.00 cm    Ao Root D 3.27 cm

## 2021-08-24 VITALS
HEART RATE: 64 BPM | OXYGEN SATURATION: 96 % | HEIGHT: 68 IN | TEMPERATURE: 97.8 F | DIASTOLIC BLOOD PRESSURE: 74 MMHG | RESPIRATION RATE: 18 BRPM | WEIGHT: 200 LBS | BODY MASS INDEX: 30.31 KG/M2 | SYSTOLIC BLOOD PRESSURE: 143 MMHG

## 2021-08-24 LAB
ANION GAP SERPL CALC-SCNC: 5 MMOL/L (ref 3–18)
ATRIAL RATE: 66 BPM
BASOPHILS # BLD: 0 K/UL (ref 0–0.1)
BASOPHILS NFR BLD: 0 % (ref 0–2)
BUN SERPL-MCNC: 10 MG/DL (ref 7–18)
BUN/CREAT SERPL: 14 (ref 12–20)
CALCIUM SERPL-MCNC: 8.3 MG/DL (ref 8.5–10.1)
CALCULATED P AXIS, ECG09: 47 DEGREES
CALCULATED R AXIS, ECG10: 71 DEGREES
CALCULATED T AXIS, ECG11: 70 DEGREES
CHLORIDE SERPL-SCNC: 108 MMOL/L (ref 100–111)
CK MB CFR SERPL CALC: NORMAL % (ref 0–4)
CK MB SERPL-MCNC: <1 NG/ML (ref 5–25)
CK SERPL-CCNC: 133 U/L (ref 26–192)
CO2 SERPL-SCNC: 26 MMOL/L (ref 21–32)
CREAT SERPL-MCNC: 0.69 MG/DL (ref 0.6–1.3)
DIAGNOSIS, 93000: NORMAL
DIFFERENTIAL METHOD BLD: ABNORMAL
EOSINOPHIL # BLD: 0 K/UL (ref 0–0.4)
EOSINOPHIL NFR BLD: 1 % (ref 0–5)
ERYTHROCYTE [DISTWIDTH] IN BLOOD BY AUTOMATED COUNT: 14.1 % (ref 11.6–14.5)
GLUCOSE BLD STRIP.AUTO-MCNC: 74 MG/DL (ref 70–110)
GLUCOSE SERPL-MCNC: 73 MG/DL (ref 74–99)
HCT VFR BLD AUTO: 31.1 % (ref 35–45)
HGB BLD-MCNC: 10.1 G/DL (ref 12–16)
INR PPP: 1.5 (ref 0.8–1.2)
LYMPHOCYTES # BLD: 1.5 K/UL (ref 0.9–3.6)
LYMPHOCYTES NFR BLD: 20 % (ref 21–52)
MCH RBC QN AUTO: 30.1 PG (ref 24–34)
MCHC RBC AUTO-ENTMCNC: 32.5 G/DL (ref 31–37)
MCV RBC AUTO: 92.6 FL (ref 78–100)
MONOCYTES # BLD: 0.8 K/UL (ref 0.05–1.2)
MONOCYTES NFR BLD: 11 % (ref 3–10)
NEUTS SEG # BLD: 5.1 K/UL (ref 1.8–8)
NEUTS SEG NFR BLD: 68 % (ref 40–73)
P-R INTERVAL, ECG05: 146 MS
PLATELET # BLD AUTO: 188 K/UL (ref 135–420)
PMV BLD AUTO: 10.8 FL (ref 9.2–11.8)
POTASSIUM SERPL-SCNC: 4.1 MMOL/L (ref 3.5–5.5)
PROTHROMBIN TIME: 18 SEC (ref 11.5–15.2)
Q-T INTERVAL, ECG07: 430 MS
QRS DURATION, ECG06: 86 MS
QTC CALCULATION (BEZET), ECG08: 450 MS
RBC # BLD AUTO: 3.36 M/UL (ref 4.2–5.3)
SODIUM SERPL-SCNC: 139 MMOL/L (ref 136–145)
TROPONIN I SERPL-MCNC: <0.02 NG/ML (ref 0–0.04)
VENTRICULAR RATE, ECG03: 66 BPM
WBC # BLD AUTO: 7.5 K/UL (ref 4.6–13.2)

## 2021-08-24 PROCEDURE — 82533 TOTAL CORTISOL: CPT

## 2021-08-24 PROCEDURE — 82962 GLUCOSE BLOOD TEST: CPT

## 2021-08-24 PROCEDURE — 74011250637 HC RX REV CODE- 250/637: Performed by: INTERNAL MEDICINE

## 2021-08-24 PROCEDURE — 80048 BASIC METABOLIC PNL TOTAL CA: CPT

## 2021-08-24 PROCEDURE — 74011250637 HC RX REV CODE- 250/637: Performed by: STUDENT IN AN ORGANIZED HEALTH CARE EDUCATION/TRAINING PROGRAM

## 2021-08-24 PROCEDURE — 85610 PROTHROMBIN TIME: CPT

## 2021-08-24 PROCEDURE — 85025 COMPLETE CBC W/AUTO DIFF WBC: CPT

## 2021-08-24 PROCEDURE — 87040 BLOOD CULTURE FOR BACTERIA: CPT

## 2021-08-24 PROCEDURE — 82553 CREATINE MB FRACTION: CPT

## 2021-08-24 PROCEDURE — 99239 HOSP IP/OBS DSCHRG MGMT >30: CPT | Performed by: INTERNAL MEDICINE

## 2021-08-24 PROCEDURE — 93005 ELECTROCARDIOGRAM TRACING: CPT

## 2021-08-24 PROCEDURE — 36415 COLL VENOUS BLD VENIPUNCTURE: CPT

## 2021-08-24 RX ORDER — ACETAMINOPHEN 325 MG/1
650 TABLET ORAL
Qty: 60 TABLET | Refills: 1 | OUTPATIENT
Start: 2021-08-24

## 2021-08-24 RX ADMIN — EZETIMIBE 10 MG: 10 TABLET ORAL at 09:35

## 2021-08-24 RX ADMIN — METOPROLOL TARTRATE 12.5 MG: 25 TABLET, FILM COATED ORAL at 10:44

## 2021-08-24 RX ADMIN — BUPROPION HYDROCHLORIDE 300 MG: 300 TABLET, FILM COATED, EXTENDED RELEASE ORAL at 09:37

## 2021-08-24 RX ADMIN — Medication 10 ML: at 06:00

## 2021-08-24 RX ADMIN — ACETAMINOPHEN 650 MG: 325 TABLET ORAL at 03:32

## 2021-08-24 NOTE — PROGRESS NOTES
OT orders received and medical chart review completed. Per nursing, hold OT evaluation d/t pt not medically stable. OT to follow.     Thank you for this referral,  Sneha Robles MS OTR/L

## 2021-08-24 NOTE — PROGRESS NOTES
Problem: Urinary Tract Infection - Adult  Goal: *Absence of infection signs and symptoms  Outcome: Progressing Towards Goal     Problem: Patient Education: Go to Patient Education Activity  Goal: Patient/Family Education  Outcome: Progressing Towards Goal     Problem: Falls - Risk of  Goal: *Absence of Falls  Description: Document Leeann Nico Fall Risk and appropriate interventions in the flowsheet.   Outcome: Progressing Towards Goal  Note: Fall Risk Interventions:  Mobility Interventions: Communicate number of staff needed for ambulation/transfer, Patient to call before getting OOB    Mentation Interventions: Adequate sleep, hydration, pain control, Evaluate medications/consider consulting pharmacy, More frequent rounding    Medication Interventions: Evaluate medications/consider consulting pharmacy, Patient to call before getting OOB    Elimination Interventions: Patient to call for help with toileting needs, Call light in reach    History of Falls Interventions: Evaluate medications/consider consulting pharmacy         Problem: Patient Education: Go to Patient Education Activity  Goal: Patient/Family Education  Outcome: Progressing Towards Goal

## 2021-08-24 NOTE — PROGRESS NOTES
Pharmacist Consult: Warfarin Management    Assessment/Plan  INR is 1.5  Will give warfarin 6 mg as a one-time dose. Recheck INR daily and adjust dose accordingly. Will continue to follow INR trend and patient's clinical progress daily. Subjective/Objective  Patient on warfarin therapy for aortic thrombosis history  Target goal INR of 2.5- 3.5  Patient has been on warfarin PTA. Patient's home dose is 5 mg daily, per [facility] with [address] ([phone]). Last filled on 07/31/21. Before that filled in March. Per patient, struggles with keeping goal INR of 2.5-3.5 (non-compliant per RX history).      Date 8/23 8/24        INR 1.5 1.5        Warfarin Dose (mg) 6 6            Bleeding/Sensitivity Risk Factors: Hx variable INRs, Hx GIB, and Hypertension  Potentially interacting medications include:   Drugs that may increase INR: None  Drugs that may decrease INR: None  Other anticoagulants include: NSAID (celebrex)    Recent Labs  Coags Lab Results   Component Value Date/Time    PTP 18.0 (H) 08/24/2021 07:35 AM    INR 1.5 (H) 08/24/2021 07:35 AM      CBC Lab Results   Component Value Date/Time    WBC 7.5 08/24/2021 07:35 AM    HGB 10.1 (L) 08/24/2021 07:35 AM    HCT 31.1 (L) 08/24/2021 07:35 AM     08/24/2021 07:35 AM      Occult Blood No results found for: OB1, OB2, OB3, OCBL, POCOBL, SOB, OCCBLDEXT, OCCBLDEXT       Thank you,  Meryle Harlem, [facility]  8/24/2021

## 2021-08-24 NOTE — PROGRESS NOTES
PT order received and chart was reviewed. Per nursing patient is not medically stable for evaluation at this time. Will continue to follow.   Lady Figueroa, PT

## 2021-08-24 NOTE — DISCHARGE SUMMARY
[facility]  Discharge Summary       Patient: Edu Lal Age: 60 y.o. : 1955 MR#: [id number] [id number]: [id number]  PCP on record: Jose Marcelino MD  Admit date: 2021  Discharge date: 2021    Consults:    None     Procedures:  None     Significant Diagnostic Studies:   Transthoracic Echocardiogram  MRI Head  CT/CTA head/neck  -    Discharge Diagnoses:                                           Patient Active Problem List   Diagnosis Code    Coronary atherosclerosis of native coronary artery I25.10    Postsurgical percutaneous transluminal coronary angioplasty status Z98.61    Old myocardial infarction I25.2    Hyperlipidemia E78.5    Aortic mural thrombus (HCC) I74.10    Gastrointestinal hemorrhage K92.2    Hypercoagulable state ([address] D68.59    Acute appendicitis K35.80    UTI (urinary tract infection) N39.0    AMS (altered mental status) R41.82       Hospital Course by Problem   1. Altered Mental Status: Heriberto Saldivar is a [de-identified] year old female with a history of coronary artery disease, aortic mural thrombus, diabetes, and chronic pain who presented to [facility] following a period of incoherency and AMS that occurred just prior to arrival. This is in the setting of a two month history of intermittent periods of syncope and word finding difficulty, typically following doses of her Lyrica. Due to concerns for stroke, a code stroke was initiated and subsequent neuroimaging was negative for acute findings [although it did show left vertebral artery stenosis]. Based on her workup, her AMS was noted to be multifactorial: related to her recent initiation of her Lyrica, urinary tract infection, and orthostatic hypotension 2/2 bouts of diarrhea and decreased oral intake. Her lyrica was stopped and she was recommended to follow-up with her PCM in one week for re-assessment. 2. Hypotension:  This was noted upon admission and she was noted to be orthostatic. Fluid resuscitation was initiated and her mental status and blood pressure improved. 3. UTI. Mild with improvements following x2 doses of IV ceftriaxone. Today's examination of the patient revealed:     Subjective: Reports feeling much better. Still reports neck pain, which is chronic. Objective:   VS:   Visit Vitals  BP (!) 118/50   Pulse 69   Temp 97.6 °F (36.4 °C)   Resp 16   Ht 5' 8\" (1.727 m)   Wt 90.7 kg (200 lb)   SpO2 97%   BMI 30.41 kg/m²      Tmax/24hrs: Temp (24hrs), Av.2 °F (36.8 °C), Min:97.1 °F (36.2 °C), Max:99 °F (37.2 °C)     Input/Output:     Intake/Output Summary (Last 24 hours) at 2021 1148  Last data filed at 2021 0655  Gross per 24 hour   Intake 1487.5 ml   Output 400 ml   Net 1087.5 ml       General appearance -awake, follows commands appropriately, not in acute distress  Eyes - sclera anicteric, no pallor  Nose - no obvious nasal discharge. Neck - supple, no JVD, trachea is midline  Chest -clear air entry noted in bases, no wheezes  Heart - S1 and S2 normal  Abdomen - soft, nontender, nondistended, Bowel sounds present  Neurological - alert, oriented, normal speech, no focal findings noted, increased sensitivity and reduced sensation in both lower extremity especially in feet.   Musculoskeletal - no joint tenderness or swelling of knees bilaterally  Extremities - no pedal edema noted    Labs:    Recent Results (from the past 24 hour(s))   GLUCOSE, POC    Collection Time: 21 12:00 PM   Result Value Ref Range    Glucose (POC) 112 (H) 70 - 110 mg/dL   ECHO ADULT COMPLETE    Collection Time: 21  3:06 PM   Result Value Ref Range    IVSd 0.79 0.60 - 0.90 cm    LVIDd 4.97 3.90 - 5.30 cm    LVIDs 3.93 cm    LVOT d 2.00 cm    LVPWd 0.90 0.60 - 0.90 cm    LVOT Cardiac Output 6.72 liter/minute    LVOT Peak Gradient 6.75 mmHg    Left Ventricular Outflow Tract Mean Gradient 3.51 mmHg    LVOT SV 86.3 mL    LVOT Peak Velocity 129.94 cm/s    LVOT VTI 27.53 cm    LA Volume 37.65 22.0 - 52.0 mL    LA Area 4C 12.17 cm2    LA Vol 2C 43.07 22.00 - 52.00 mL    LA Vol 4C 25.56 22.00 - 52.00 mL    MV A Jose 85.65 cm/s    Mitral Valve E Wave Deceleration Time 184.92 ms    MV E Jose 82.16 cm/s    E/E' lateral 7.58     E/E' septal 9.05     LV E' Lateral Velocity 10.84 cm/s    LV E' Septal Velocity 9.08 cm/s    Tapse 2.12 (A) 1.50 - 2.00 cm    Ao Root D 3.27 cm    MV E/A 0.96     LV Mass .2 67.0 - 162.0 g    LV Mass AL Index 70.7 43.0 - 95.0 g/m2    E/E' ratio (averaged) 8.31     LA Vol Index 18.46 16.00 - 28.00 ml/m2    LA Vol Index 21.11 16.00 - 28.00 ml/m2    LA Vol Index 12.53 16.00 - 28.00 ml/m2   GLUCOSE, POC    Collection Time: 08/23/21 10:59 PM   Result Value Ref Range    Glucose (POC) 85 70 - 110 mg/dL   EKG, 12 LEAD, SUBSEQUENT    Collection Time: 08/24/21  3:18 AM   Result Value Ref Range    Ventricular Rate 66 BPM    Atrial Rate 66 BPM    P-R Interval 146 ms    QRS Duration 86 ms    Q-T Interval 430 ms    QTC Calculation (Bezet) 450 ms    Calculated P Axis 47 degrees    Calculated R Axis 71 degrees    Calculated T Axis 70 degrees    Diagnosis       Normal sinus rhythm  Normal ECG  When compared with ECG of 22-AUG-2021 08:18,  No significant change was found  Confirmed by Ryan Jung MD, Keena Tobin ([id number]) on 8/24/2021 2:31:34 AM     METABOLIC PANEL, BASIC    Collection Time: 08/24/21  7:35 AM   Result Value Ref Range    Sodium 139 136 - 145 mmol/L    Potassium 4.1 3.5 - 5.5 mmol/L    Chloride 108 100 - 111 mmol/L    CO2 26 21 - 32 mmol/L    Anion gap 5 3.0 - 18 mmol/L    Glucose 73 (L) 74 - 99 mg/dL    BUN 10 7.0 - 18 MG/DL    Creatinine 0.69 0.6 - 1.3 MG/DL    BUN/Creatinine ratio 14 12 - 20      GFR est AA >60 >60 ml/min/1.73m2    GFR est non-AA >60 >60 ml/min/1.73m2    Calcium 8.3 (L) 8.5 - 10.1 MG/DL   PROTHROMBIN TIME + INR    Collection Time: 08/24/21  7:35 AM   Result Value Ref Range    Prothrombin time 18.0 (H) 11.5 - 15.2 sec    INR 1.5 (H) 0.8 - 1.2 CBC WITH AUTOMATED DIFF    Collection Time: 08/24/21  7:35 AM   Result Value Ref Range    WBC 7.5 4.6 - 13.2 K/uL    RBC 3.36 (L) 4.20 - 5.30 M/uL    HGB 10.1 (L) 12.0 - 16.0 g/dL    HCT 31.1 (L) 35.0 - 45.0 %    MCV 92.6 78.0 - 100.0 FL    MCH 30.1 24.0 - 34.0 PG    MCHC 32.5 31.0 - 37.0 g/dL    RDW 14.1 11.6 - 14.5 %    PLATELET 621 993 - 888 K/uL    MPV 10.8 9.2 - 11.8 FL    NEUTROPHILS 68 40 - 73 %    LYMPHOCYTES 20 (L) 21 - 52 %    MONOCYTES 11 (H) 3 - 10 %    EOSINOPHILS 1 0 - 5 %    BASOPHILS 0 0 - 2 %    ABS. NEUTROPHILS 5.1 1.8 - 8.0 K/UL    ABS. LYMPHOCYTES 1.5 0.9 - 3.6 K/UL    ABS. MONOCYTES 0.8 0.05 - 1.2 K/UL    ABS. EOSINOPHILS 0.0 0.0 - 0.4 K/UL    ABS. BASOPHILS 0.0 0.0 - 0.1 K/UL    DF AUTOMATED     CARDIAC PANEL,(CK, CKMB & TROPONIN)    Collection Time: 08/24/21  7:35 AM   Result Value Ref Range    CK - MB <1.0 <3.6 ng/ml    CK-MB Index  0.0 - 4.0 %     CALCULATION NOT PERFORMED WHEN RESULT IS BELOW LINEAR LIMIT     26 - 192 U/L    Troponin-I, QT <0.02 0.0 - 0.045 NG/ML   CULTURE, BLOOD    Collection Time: 08/24/21  7:35 AM    Specimen: Blood   Result Value Ref Range    Special Requests: NO SPECIAL REQUESTS      Culture result: NO GROWTH AFTER 1 HOUR     CULTURE, BLOOD    Collection Time: 08/24/21  7:55 AM    Specimen: Blood   Result Value Ref Range    Special Requests: NO SPECIAL REQUESTS      Culture result: NO GROWTH AFTER 1 HOUR       Additional Data Reviewed:     Condition:   Disposition:    [x]Home   []Home with Home Health   []SNF/NH   []Rehab   []Home with family   []Alternate Facility:____________________      Discharge Medications:     Current Discharge Medication List      START taking these medications    Details   acetaminophen (TYLENOL) 325 mg tablet Take 2 Tablets by mouth every six (6) hours as needed (pain).   Qty: 60 Tablet, Refills: 1         CONTINUE these medications which have NOT CHANGED    Details   buPROPion XL (WELLBUTRIN XL) 300 mg XL tablet Take 300 mg by mouth daily. Indications: anxiousness associated with depression      clonazePAM (KlonoPIN) 0.5 mg tablet Take 0.5 mg by mouth every twelve (12) hours as needed for Anxiety.      ezetimibe (ZETIA) 10 mg tablet Take 10 mg by mouth.      esomeprazole (NexIUM) 40 mg capsule Take 40 mg by mouth daily. calcium polycarbophil (FIBERCON PO) Take  by mouth.      glimepiride (AMARYL) 2 mg tablet Take  by mouth every morning. torsemide (DEMADEX) 20 mg tablet Take  by mouth daily. pioglitazone (ACTOS) 30 mg tablet Take  by mouth daily. metoprolol tartrate (LOPRESSOR) 25 mg tablet Take 0.5 Tabs by mouth two (2) times a day. Qty: 90 Tab, Refills: 3      co-enzyme Q-10 (CO Q-10) 100 mg capsule Take 100 mg by mouth daily. senna-docusate (PERICOLACE) 8.6-50 mg per tablet Take 1 Tab by mouth daily. pitavastatin (LIVALO) 4 mg tab tablet Take 4 mg by mouth daily. traZODone (DESYREL) 100 mg tablet Take 100 mg by mouth nightly. warfarin (Coumadin) 5 mg tablet Take 5 mg by mouth daily. metFORMIN (GLUCOPHAGE) 500 mg tablet Take 500 mg by mouth daily (with breakfast). CALCIUM CARBONATE (CALCIUM 600) 1,500 (600) mg Tab Take 500 mg by mouth once. cyanocobalamin (VITAMIN B-12) 2,000 mcg TbER Take 2,000 mcg by mouth once. doxycycline hyclate (DORYX) 75 mg TbEC Take 100 mg by mouth daily. omega-3 fatty acids-vitamin e (FISH OIL) 1,000 mg Cap Take 1 Cap by mouth daily. cholecalciferol, vitamin d3, (VITAMIN D) 1,000 unit tablet Take  by mouth daily. STOP taking these medications       celecoxib (CELEBREX) 200 mg capsule Comments:   Reason for Stopping:         pantoprazole (PROTONIX) 40 mg tablet Comments:   Reason for Stopping:         pregabalin (LYRICA) 100 mg capsule Comments:   Reason for Stopping:         oxyCODONE-acetaminophen (PERCOCET) 5-325 mg per tablet Comments:   Reason for Stopping: Follow-up Appointments:   1.  Your PCP: Lm Pierce MD, within 7-10days        Please follow-up on tests/labs that are still pendin.  Blood culture; urine culture    >30 minutes spent coordinating this discharge (review instructions/follow-up, prescriptions, preparing report for sign off)    Signed:  Temo Womack MD  2021  11:48 AM

## 2021-08-24 NOTE — ROUTINE PROCESS
1695- Bedside and Verbal shift change report given to Cuong Chris (oncoming nurse) by Dominick Gonzalez (offgoing nurse). Report included the following information SBAR, Kardex, Intake/Output, MAR, Recent Results and Cardiac Rhythm NSR .  0001- Assessment completed. Normal saline at 75 ml/hr via IV pump. Call bell in reach. Patient with no complaints. 1286- EKG completed. 9687- Tylenol 650 mg given PO for anterior headache. See pain flow sheet. 0400- denies pain at present time. 0600- slept off and on this shift. Arouses easily when nurse enters room.

## 2021-08-25 LAB — CORTIS AM PEAK SERPL-MCNC: 25.2 UG/DL (ref 4.3–22.45)

## 2021-08-26 NOTE — PROGRESS NOTES
Physician Progress Note      PATIENT:               Oracio Uribe  CSN #:                  [id number]  :                       1955  ADMIT DATE:       2021 8:23 AM  DISCH DATE:        2021 2:29 PM  RESPONDING  PROVIDER #:        Ling BASILIO DO          QUERY TEXT:    Dear Hospitalist    Pt admitted with AMS per discharge summary multifactorial.  If possible, please document in the progress notes and discharge summary if you are evaluating and / or treating any of the following: The medical record reflects the following:  Risk Factors: Lyrica, UTI, Diarrhea and decreased oral intake    Clinical Indicators:  * Per DC Summary: \"Based on her workup, her AMS was noted to be multifactorial: related to her recent initiation of her Lyrica, urinary tract infection, and orthostatic hypotension 2/2 bouts of diarrhea and decreased oral intake. \"  * Cr 1.17 -> 0.69    Treatment: Lyrica stopped, IV rocephin for UTI, IVF boluses and infusion    Thank you,  Rajinder COHENN, RN, CRCR  Please contact me for any questions or concerns regarding this query at [email or web address]  Options provided:  -- Drug-induced encephalopathy due to Lyrica  -- Metabolic encephalopathy  -- Toxic encephalopathy  -- Toxic metabolic encephalopathy  -- Other - I will add my own diagnosis  -- Disagree - Not applicable / Not valid  -- Disagree - Clinically unable to determine / Unknown  -- Refer to Clinical Documentation Reviewer    PROVIDER RESPONSE TEXT:    This patient has metabolic encephalopathy.     Query created by: Janey Fleischer on 2021 3:15 PM      Electronically signed by:  Charles Medrano DO 2021 8:15 AM

## 2021-08-27 LAB
BACTERIA SPEC CULT: ABNORMAL
BACTERIA SPEC CULT: ABNORMAL
CC UR VC: ABNORMAL
SERVICE CMNT-IMP: ABNORMAL

## 2021-08-30 LAB
BACTERIA SPEC CULT: NORMAL
BACTERIA SPEC CULT: NORMAL
SERVICE CMNT-IMP: NORMAL
SERVICE CMNT-IMP: NORMAL

## 2021-09-03 NOTE — PROGRESS NOTES
Physician Progress Note      PATIENT:               Ursula Sol  CSN #:                  [id number]  :                       1955  ADMIT DATE:       2021 8:23 AM  DISCH DATE:        2021 2:29 PM  RESPONDING  PROVIDER #:        Miguel BASILIO DO          QUERY TEXT:    Dear Hospitalist,    Pt admitted with UTI and AMS. Pt noted to have Oral temp 100.6; RRs of 29, 21; Metabolic Encephalopathy. If possible, please document if you were evaluating and /or treating any of the following: The medical record reflects the following:  Risk Factors: UTI, 59 y/o female    Clinical Indicators:  * UTI Culture Grew >100,000 col/ml  - E. coli-ESBL  and  - Proteus Mirabilis  * Oral Temp 100.6  * RRs 29, 21  * Metabolic Encephalopathy    Treatment: Given IV Rocephin, NS Boluses and infusion, Urine and blood cx    Thank you,  Caitlin COHENN, RN, CRCR  Please contact me for any questions or concerns regarding this query at [email or web address]  Options provided:  -- Sepsis, present on admission  -- UTI without Sepsis  -- Other - I will add my own diagnosis  -- Disagree - Not applicable / Not valid  -- Disagree - Clinically unable to determine / Unknown  -- Refer to Clinical Documentation Reviewer    PROVIDER RESPONSE TEXT:    This patient had sepsis which was present on admission.     Query created by: Holly Holcomb on 2021 11:59 AM      Electronically signed by:  Patria Bryant DO 9/3/2021 11:30 AM

## 2021-09-30 ENCOUNTER — OFFICE VISIT (OUTPATIENT)
Dept: CARDIOLOGY CLINIC | Age: 66
End: 2021-09-30
Payer: MEDICARE

## 2021-09-30 VITALS
HEIGHT: 68 IN | DIASTOLIC BLOOD PRESSURE: 64 MMHG | BODY MASS INDEX: 29.25 KG/M2 | WEIGHT: 193 LBS | SYSTOLIC BLOOD PRESSURE: 110 MMHG | HEART RATE: 69 BPM

## 2021-09-30 DIAGNOSIS — I25.10 ATHEROSCLEROSIS OF NATIVE CORONARY ARTERY OF NATIVE HEART WITHOUT ANGINA PECTORIS: Primary | ICD-10-CM

## 2021-09-30 DIAGNOSIS — Z98.61 POSTSURGICAL PERCUTANEOUS TRANSLUMINAL CORONARY ANGIOPLASTY STATUS: ICD-10-CM

## 2021-09-30 DIAGNOSIS — E78.5 HYPERLIPIDEMIA, UNSPECIFIED HYPERLIPIDEMIA TYPE: ICD-10-CM

## 2021-09-30 DIAGNOSIS — D68.59 HYPERCOAGULABLE STATE (HCC): ICD-10-CM

## 2021-09-30 DIAGNOSIS — I25.2 OLD MYOCARDIAL INFARCTION: ICD-10-CM

## 2021-09-30 PROCEDURE — 99214 OFFICE O/P EST MOD 30 MIN: CPT | Performed by: INTERNAL MEDICINE

## 2021-09-30 PROCEDURE — G9899 SCRN MAM PERF RSLTS DOC: HCPCS | Performed by: INTERNAL MEDICINE

## 2021-09-30 PROCEDURE — 3017F COLORECTAL CA SCREEN DOC REV: CPT | Performed by: INTERNAL MEDICINE

## 2021-09-30 PROCEDURE — G8427 DOCREV CUR MEDS BY ELIG CLIN: HCPCS | Performed by: INTERNAL MEDICINE

## 2021-09-30 PROCEDURE — G8432 DEP SCR NOT DOC, RNG: HCPCS | Performed by: INTERNAL MEDICINE

## 2021-09-30 PROCEDURE — G8419 CALC BMI OUT NRM PARAM NOF/U: HCPCS | Performed by: INTERNAL MEDICINE

## 2021-09-30 NOTE — PROGRESS NOTES
HISTORY OF PRESENT ILLNESS  Lennox Magnus is a 60 y.o. female. Patient with cad,old mi,hypercoaguble state,old pci. On follow up patient denies any chest pains,sob, palpitation or other significant symptoms. 5/2019 - S/P Appendectomy 1/2019 - requiring transfusion of 3 units FFP  9/2021  Patient is here for follow-up. She was recently admitted to hospital 8/2021 and discharged finding included  1. Altered Mental Status: Belen Han is a [de-identified] year old female with a history of coronary artery disease, aortic mural thrombus, diabetes, and chronic pain who presented to [facility] following a period of incoherency and AMS that occurred just prior to arrival. This is in the setting of a two month history of intermittent periods of syncope and word finding difficulty, typically following doses of her Lyrica. Due to concerns for stroke, a code stroke was initiated and subsequent neuroimaging was negative for acute findings (although it did show left vertebral artery stenosis). Based on her workup, her AMS was noted to be multifactorial: related to her recent initiation of her Lyrica, urinary tract infection, and orthostatic hypotension 2/2 bouts of diarrhea and decreased oral intake. Her lyrica was stopped and she was recommended to follow-up with her PCM in one week for re-assessment.      2. Hypotension: This was noted upon admission and she was noted to be orthostatic. Fluid resuscitation was initiated and her mental status and blood pressure improved.      3. UTI. Mild with improvements following x2 doses of IV ceftriaxone. About 2 weeks later she ended up in [address] [address] [facility] with similar symptoms. She was noted to have a GI infection with Campylobacter. She was given antibiotic. She had GI bleeding at that time. After treatment she has completely resolved. Alert awake no ongoing symptoms    Cholesterol Problem  The history is provided by the patient. This is a chronic problem.  The problem occurs constantly. Pertinent negatives include no chest pain, no abdominal pain, no headaches and no shortness of breath. Follow-up  Pertinent negatives include no chest pain, no abdominal pain, no headaches and no shortness of breath. Review of Systems   Constitutional: Negative for chills and fever. HENT: Negative for nosebleeds. Eyes: Negative for blurred vision and double vision. Respiratory: Negative for cough, hemoptysis, sputum production, shortness of breath and wheezing. Cardiovascular: Negative for chest pain, palpitations, orthopnea, claudication, leg swelling and PND. Gastrointestinal: Negative for abdominal pain, heartburn, nausea and vomiting. Musculoskeletal: Negative for myalgias. Skin: Negative for rash. Neurological: Negative for dizziness, weakness and headaches. Endo/Heme/Allergies: Does not bruise/bleed easily. Family History   Problem Relation Age of Onset   Murrтатьяна Neither Cancer Mother     Heart Disease Mother    Armando Neither Cancer Father     Hypertension Other         essential    Heart Attack Other         myocardial infarction    Other Other         Hypercholesterolemia    Cancer Sister        Past Medical History:   Diagnosis Date    Anemia     Aortic mural thrombus ([facility] 11/24/2014    on coumadin followed by dr Marika Ames Aortic thrombus [facility]) 03/29/13    CAD (coronary artery disease)     Clot     Aortic clot    Clotting disorder (HCC)     Coronary atherosclerosis of native coronary artery     Stable, 18 mos. Since pce, will d/c effient    Diabetes mellitus     Gastrointestinal hemorrhage 6/25/2018    No clear etiology of GI bleed. Recent workup noted    Heart abnormalities     Hypercholesterolemia     Hypercoagulable state ([address] 6/25/2018    Maintain on Coumadin. INR followed by PCP.     Hyperlipidemia     Iron deficiency     MI (myocardial infarction) ([facility]     Old myocardial infarction     Post RCA PCI    Other and unspecified hyperlipidemia     On multiple meds, pt had side effect of 28 differnet meds, currently on lipofen and livalo    Postsurgical percutaneous transluminal coronary angioplasty status     stable    Postsurgical percutaneous transluminal coronary angioplasty status     Raynaud's disease     Screening for depression     Splenic infarction 13    Ulcer        Past Surgical History:   Procedure Laterality Date    FLEXIBLE SIGMOIDOSCOPY N/A 2018    SIGMOIDOSCOPY FLEXIBLE performed by Alexander Yañez MD at [address]       delivery    HX COLONOSCOPY      HX CORONARY STENT PLACEMENT      RCA, VALDEZ    HX GYN      HX HEENT      HX HYSTERECTOMY      abdominal    HX OOPHORECTOMY      1 removed    HX ORTHOPAEDIC      right shoulder surgery    HX TONSILLECTOMY      WA CARDIAC SURG PROCEDURE UNLIST      [place] LAP,APPENDECTOMY N/A 2019    Dr. Elizabeth Black       Social History     Tobacco Use    Smoking status: Former Smoker     Packs/day: 2.50     Years: 40.00     Pack years: 100.00     Quit date: 2019     Years since quittin.7    Smokeless tobacco: Never Used    Tobacco comment: working on [employer] [employer]; chewing gum and using patch   Substance Use Topics    Alcohol use: No     Comment: very rare       Allergies   Allergen Reactions    Latex Other (comments)     Blisters and bleeding from blisters.  Nitroglycerin Other (comments)     Patient states \"that when given medication last time she went into arrest\"    Morphine Shortness of Breath    Sulfa (Sulfonamide Antibiotics) Nausea Only    Tetanus Vaccines And Toxoid Swelling and Other (comments)     fever       Current Outpatient Medications   Medication Sig    acetaminophen (TYLENOL) 325 mg tablet Take 2 Tablets by mouth every six (6) hours as needed (pain).  buPROPion XL (WELLBUTRIN XL) 300 mg XL tablet Take 300 mg by mouth daily.  Indications: anxiousness associated with depression    clonazePAM (KlonoPIN) 0.5 mg tablet Take 0.5 mg by mouth every twelve (12) hours as needed for Anxiety.  ezetimibe (ZETIA) 10 mg tablet Take 10 mg by mouth.  esomeprazole (NexIUM) 40 mg capsule Take 40 mg by mouth daily.  calcium polycarbophil (FIBERCON PO) Take  by mouth.  glimepiride (AMARYL) 2 mg tablet Take  by mouth every morning.  torsemide (DEMADEX) 20 mg tablet Take  by mouth daily.  pioglitazone (ACTOS) 30 mg tablet Take  by mouth daily.  metoprolol tartrate (LOPRESSOR) 25 mg tablet Take 0.5 Tabs by mouth two (2) times a day.  co-enzyme Q-10 (CO Q-10) 100 mg capsule Take 100 mg by mouth daily.  senna-docusate (PERICOLACE) 8.6-50 mg per tablet Take 1 Tab by mouth daily.  pitavastatin (LIVALO) 4 mg tab tablet Take 4 mg by mouth daily.  traZODone (DESYREL) 100 mg tablet Take 100 mg by mouth nightly.  warfarin (Coumadin) 5 mg tablet Take 5 mg by mouth daily.  metFORMIN (GLUCOPHAGE) 500 mg tablet Take 500 mg by mouth daily (with breakfast).  CALCIUM CARBONATE (CALCIUM 600) 1,500 (600) mg Tab Take 500 mg by mouth once.  cyanocobalamin (VITAMIN B-12) 2,000 mcg TbER Take 2,000 mcg by mouth once.  doxycycline hyclate (DORYX) 75 mg TbEC Take 100 mg by mouth daily.  omega-3 fatty acids-vitamin e (FISH OIL) 1,000 mg Cap Take 1 Cap by mouth daily.  cholecalciferol, vitamin d3, (VITAMIN D) 1,000 unit tablet Take  by mouth daily. No current facility-administered medications for this visit. Visit Vitals  /64 (BP 1 Location: Left upper arm, BP Patient Position: Sitting, BP Cuff Size: Adult)   Pulse 69   Ht 5' 8\" (1.727 m)   Wt 87.5 kg (193 lb)   BMI 29.35 kg/m²         Physical Exam  Constitutional:       Appearance: She is well-developed. HENT:      Head: Normocephalic and atraumatic. Eyes:      Conjunctiva/sclera: Conjunctivae normal.   Neck:      Thyroid: No thyromegaly. Vascular: No JVD. Trachea: No tracheal deviation. Cardiovascular:      Rate and Rhythm: Normal rate and regular rhythm. Chest Wall: PMI is not displaced. Pulses: No decreased pulses. Heart sounds: Murmur heard. Early systolic murmur is present at the upper right sternal border. No gallop. Pulmonary:      Effort: No respiratory distress. Breath sounds: No wheezing or rales. Chest:      Chest wall: No tenderness. Abdominal:      Palpations: Abdomen is soft. Tenderness: There is no abdominal tenderness. Musculoskeletal:      Cervical back: Neck supple. Skin:     General: Skin is warm. Neurological:      Mental Status: She is alert and oriented to person, place, and time. Ms. Robin Gupta has a reminder for a \"due or due soon\" health maintenance. I have asked that she contact her primary care provider for follow-up on this health maintenance. No flowsheet data found. Impression:cta-2014    Small amount of residual thrombus remains in the left lateral wall of the distal thoracic aorta.  The large luminal filling defect is no longer evident.  No new filling defects. Ulcerated plaque at the abdominal aorta at the level of the JOLLY posteriorly also stable in appearance. Mesenteric narrowings as above similar to prior exam.    Resolving splenic infarct. No recurrence of pancreatitis. Hepatic steatosis. Splenic and renal infarcts as seen previously. Interpretation Summary 9/2018  · Calculated left ventricular ejection fraction is 55%. Normal left ventricular wall motion, no regional wall motion abnormality noted. Mild (grade 1) left ventricular diastolic dysfunction. · Trace mitral valve regurgitation. · Trivial pericardial effusion. Effusion is is fibrinous. Procedure Conclusion 9/2018  Nuclear Stress Test   Negative myocardial perfusion imaging. Myocardial perfusion imaging supports a low risk stress test.   There is a prior study available for comparison. As compared to the previous study, there are no significant changes.  Normal EF:% 57      Interpretation Summary   · Gated SPECT: Left ventricular function post-stress was normal. Calculated ejection fraction is 61%. · Baseline ECG: Normal sinus rhythm. · Negative stress electrocardiogram.  · Left ventricular perfusion is normal.  · Negative myocardial perfusion imaging. Myocardial perfusion imaging supports a low risk stress test.     8/2021MRI     No acute intracranial abnormality. No mass, hemorrhage, or acute infarct. Mild chronic microvascular ischemic changes in the white matter. IMPRESSION8/2021CTA  1. Severe stenosis at the origin left vertebral artery. 2.  No intracranial occlusion or hemodynamically significant stenosis  identified. 3.  Mild atherosclerotic disease of the bilateral carotid bifurcations resulting  in approximately 20% stenosis of the right internal carotid artery. Interpretation Summary 8/2021    · LV: Estimated LVEF is 50 - 55%. Visually measured ejection fraction. Normal cavity size and wall thickness. Low normal systolic function. Assessment       ICD-10-CM ICD-9-CM    1. Atherosclerosis of native coronary artery of native heart without angina pectoris  I25.10 414.01     Stable symptoms continue treatment monitor   2. Old myocardial infarction  I25.2 412     Stable continue treatment   3. Hyperlipidemia, unspecified hyperlipidemia type  E78.5 272.4     Continue treatment lab with PCP   4. Postsurgical percutaneous transluminal coronary angioplasty status  Z98.61 V45.82     Stable   5. [facility] ([facility]  D68.59 289.81     Continue treatment follow-up with PCP     10/2018  History of aortic mural thrombus on Coumadin followed by PCP. Recent concern on GI bleed being evaluated by Dr. Samanta Nguyen. Cardiac status remains stable    5/2019 - Cardiac status stable. Appendicitis in January, recovering well. Has stopped smoking! Continue Warfarin for history of mural thrombus - INR followed by PCP.  12/2019  Cardiac status stable. Discussed use of PCSK9 inhibitor.   Prescribed at this point..  At small lung nodule on low-dose CT scan. Has follow-up evaluation pending . 9/2021  Seen after recent admission. Evaluation noted. Was in hospital multiple times last in [address] with dehydration syncope GI bleeding bloating. Had Campylobacter infection recovering slowly. Cardiac status is stable no evidence of MI in hospital echo ejection fraction 50 to 55%. Lipid profile with PCP. There are no discontinued medications. No orders of the defined types were placed in this encounter. Follow-up and Dispositions    · Return in about 6 months (around 3/30/2022).

## 2021-09-30 NOTE — PROGRESS NOTES
1. Have you been to the ER, urgent care clinic since your last visit? Hospitalized since your last visit? Yes Where: [facility] for b[facility]ical [facility]    2. Have you seen or consulted any other health care providers outside of the [address] [place] [address] since your last visit? Include any pap smears or colon screening.  No

## 2022-03-18 PROBLEM — N39.0 UTI (URINARY TRACT INFECTION): Status: ACTIVE | Noted: 2021-08-22

## 2022-03-18 PROBLEM — K35.80 ACUTE APPENDICITIS: Status: ACTIVE | Noted: 2019-01-01

## 2022-03-19 PROBLEM — D68.59 HYPERCOAGULABLE STATE (HCC): Status: ACTIVE | Noted: 2018-06-25

## 2022-03-19 PROBLEM — R41.82 AMS (ALTERED MENTAL STATUS): Status: ACTIVE | Noted: 2021-08-22

## 2022-03-19 PROBLEM — K92.2 GASTROINTESTINAL HEMORRHAGE: Status: ACTIVE | Noted: 2018-06-25

## 2022-03-30 ENCOUNTER — OFFICE VISIT (OUTPATIENT)
Dept: CARDIOLOGY CLINIC | Age: 67
End: 2022-03-30
Payer: MEDICARE

## 2022-03-30 VITALS
DIASTOLIC BLOOD PRESSURE: 69 MMHG | OXYGEN SATURATION: 94 % | HEIGHT: 68 IN | HEART RATE: 76 BPM | WEIGHT: 201 LBS | BODY MASS INDEX: 30.46 KG/M2 | SYSTOLIC BLOOD PRESSURE: 106 MMHG

## 2022-03-30 DIAGNOSIS — I25.119 ATHEROSCLEROSIS OF NATIVE CORONARY ARTERY OF NATIVE HEART WITH ANGINA PECTORIS (HCC): Primary | ICD-10-CM

## 2022-03-30 DIAGNOSIS — Z98.61 POSTSURGICAL PERCUTANEOUS TRANSLUMINAL CORONARY ANGIOPLASTY STATUS: ICD-10-CM

## 2022-03-30 DIAGNOSIS — I65.02 STENOSIS OF LEFT VERTEBRAL ARTERY: ICD-10-CM

## 2022-03-30 DIAGNOSIS — I74.10 AORTIC MURAL THROMBUS (HCC): ICD-10-CM

## 2022-03-30 DIAGNOSIS — E78.5 HYPERLIPIDEMIA, UNSPECIFIED HYPERLIPIDEMIA TYPE: ICD-10-CM

## 2022-03-30 DIAGNOSIS — I25.2 OLD MYOCARDIAL INFARCTION: ICD-10-CM

## 2022-03-30 DIAGNOSIS — D68.59 HYPERCOAGULABLE STATE (HCC): ICD-10-CM

## 2022-03-30 PROCEDURE — G8399 PT W/DXA RESULTS DOCUMENT: HCPCS | Performed by: INTERNAL MEDICINE

## 2022-03-30 PROCEDURE — 1101F PT FALLS ASSESS-DOCD LE1/YR: CPT | Performed by: INTERNAL MEDICINE

## 2022-03-30 PROCEDURE — 3017F COLORECTAL CA SCREEN DOC REV: CPT | Performed by: INTERNAL MEDICINE

## 2022-03-30 PROCEDURE — G8417 CALC BMI ABV UP PARAM F/U: HCPCS | Performed by: INTERNAL MEDICINE

## 2022-03-30 PROCEDURE — G8432 DEP SCR NOT DOC, RNG: HCPCS | Performed by: INTERNAL MEDICINE

## 2022-03-30 PROCEDURE — 99214 OFFICE O/P EST MOD 30 MIN: CPT | Performed by: INTERNAL MEDICINE

## 2022-03-30 PROCEDURE — G8427 DOCREV CUR MEDS BY ELIG CLIN: HCPCS | Performed by: INTERNAL MEDICINE

## 2022-03-30 PROCEDURE — G8536 NO DOC ELDER MAL SCRN: HCPCS | Performed by: INTERNAL MEDICINE

## 2022-03-30 PROCEDURE — 1090F PRES/ABSN URINE INCON ASSESS: CPT | Performed by: INTERNAL MEDICINE

## 2022-03-30 NOTE — PROGRESS NOTES
HISTORY OF PRESENT ILLNESS  Stephen Scruggs is a 73 y.o. female. Patient with cad,old mi,hypercoaguble state,old pci.    5/2019 - S/P Appendectomy 1/2019 - requiring transfusion of 3 units FFP  9/2021  Patient is here for follow-up. She was recently admitted to hospital 8/2021 and discharged finding included  1. Altered Mental Status: Ayse Lerner is a [de-identified] year old female with a history of coronary artery disease, aortic mural thrombus, diabetes, and chronic pain who presented to [facility] following a period of incoherency and AMS that occurred just prior to arrival. This is in the setting of a two month history of intermittent periods of syncope and word finding difficulty, typically following doses of her Lyrica. Due to concerns for stroke, a code stroke was initiated and subsequent neuroimaging was negative for acute findings (although it did show left vertebral artery stenosis). Based on her workup, her AMS was noted to be multifactorial: related to her recent initiation of her Lyrica, urinary tract infection, and orthostatic hypotension 2/2 bouts of diarrhea and decreased oral intake. Her lyrica was stopped and she was recommended to follow-up with her PCM in one week for re-assessment.      2. Hypotension: This was noted upon admission and she was noted to be orthostatic. Fluid resuscitation was initiated and her mental status and blood pressure improved.      3. UTI. Mild with improvements following x2 doses of IV ceftriaxone. About 2 weeks later she ended up in [address] [address] [facility] with similar symptoms. She was noted to have a GI infection with Campylobacter. She was given antibiotic. She had GI bleeding at that time. After treatment she has completely resolved. Alert awake no ongoing symptoms  3/2022  Patient seen today for follow-up    Follow-up  Pertinent negatives include no chest pain, no abdominal pain, no headaches and no shortness of breath.    Cholesterol Problem  The history is provided by the patient. This is a chronic problem. The problem occurs constantly. Pertinent negatives include no chest pain, no abdominal pain, no headaches and no shortness of breath. Review of Systems   Constitutional: Negative for chills and fever. HENT: Negative for nosebleeds. Eyes: Negative for blurred vision and double vision. Respiratory: Negative for cough, hemoptysis, sputum production, shortness of breath and wheezing. Cardiovascular: Negative for chest pain, palpitations, orthopnea, claudication, leg swelling and PND. Gastrointestinal: Negative for abdominal pain, heartburn, nausea and vomiting. Musculoskeletal: Negative for myalgias. Skin: Negative for rash. Neurological: Negative for dizziness, weakness and headaches. Endo/Heme/Allergies: Does not bruise/bleed easily. Family History   Problem Relation Age of Onset   [facility] Cancer Mother     Heart Disease Mother    [facility] Cancer Father     Hypertension Other         essential    Heart Attack Other         myocardial infarction    Other Other         Hypercholesterolemia    Cancer Sister        Past Medical History:   Diagnosis Date    Anemia     Aortic mural thrombus ([facility] 11/24/2014    on coumadin followed by dr Juanita Anna Aortic thrombus [facility]) 03/29/13    CAD (coronary artery disease)     Clot     Aortic clot    Clotting disorder (HCC)     Coronary atherosclerosis of native coronary artery     Stable, 18 mos. Since pce, will d/c effient    Diabetes mellitus     Gastrointestinal hemorrhage 6/25/2018    No clear etiology of GI bleed. Recent workup noted    Heart abnormalities     Hypercholesterolemia     Hypercoagulable state ([address] 6/25/2018    Maintain on Coumadin. INR followed by PCP.     Hyperlipidemia     Iron deficiency     MI (myocardial infarction) ([facility] [address] [facility]     Old myocardial infarction     Post RCA PCI    Other and unspecified hyperlipidemia     On multiple meds, pt had side effect of 28 differnet meds, currently on lipofen and livalo    Postsurgical percutaneous transluminal coronary angioplasty status     stable    Postsurgical percutaneous transluminal coronary angioplasty status     Raynaud's disease     Screening for depression     Splenic infarction 13    Ulcer        Past Surgical History:   Procedure Laterality Date    FLEXIBLE SIGMOIDOSCOPY N/A 2018    SIGMOIDOSCOPY FLEXIBLE performed by Prudence Figueroa MD at [address]  [address]       delivery    HX COLONOSCOPY      HX CORONARY STENT PLACEMENT      RCA, VALDEZ    HX GYN      HX HEENT      HX HYSTERECTOMY      abdominal    HX OOPHORECTOMY      1 removed    HX ORTHOPAEDIC      right shoulder surgery    HX TONSILLECTOMY      OR CARDIAC SURG PROCEDURE UNLIST      OR LAP,APPENDECTOMY N/A 2019    Dr. [facility]son [facility]       Social History     Tobacco Use    Smoking status: Former Smoker     Packs/day: 2.50     Years: 40.00     Pack years: 100.00     Quit date: 2019     Years since quitting: 3.2    Smokeless tobacco: Never Used    Tobacco comment: working on [address]; chewing gum and using patch   Substance Use Topics    Alcohol use: No     Comment: very rare       Allergies   Allergen Reactions    Latex Other (comments)     Blisters and bleeding from blisters.  Nitroglycerin Other (comments)     Patient states \"that when given medication last time she went into arrest\"    Morphine Shortness of Breath    Sulfa (Sulfonamide Antibiotics) Nausea Only    Tetanus Vaccines And Toxoid Swelling and Other (comments)     fever       Current Outpatient Medications   Medication Sig    acetaminophen (TYLENOL) 325 mg tablet Take 2 Tablets by mouth every six (6) hours as needed (pain).  buPROPion XL (WELLBUTRIN XL) 300 mg XL tablet Take 300 mg by mouth daily. Indications: anxiousness associated with depression    clonazePAM (KlonoPIN) 0.5 mg tablet Take 0.5 mg by mouth every twelve (12) hours as needed for Anxiety.     ezetimibe (ZETIA) 10 mg tablet Take 10 mg by mouth.  esomeprazole (NexIUM) 40 mg capsule Take 40 mg by mouth daily.  calcium polycarbophil (FIBERCON PO) Take  by mouth.  glimepiride (AMARYL) 2 mg tablet Take  by mouth every morning.  torsemide (DEMADEX) 20 mg tablet Take  by mouth daily.  pioglitazone (ACTOS) 30 mg tablet Take  by mouth daily.  metoprolol tartrate (LOPRESSOR) 25 mg tablet Take 0.5 Tabs by mouth two (2) times a day.  co-enzyme Q-10 (CO Q-10) 100 mg capsule Take 100 mg by mouth daily.  senna-docusate (PERICOLACE) 8.6-50 mg per tablet Take 1 Tab by mouth daily.  pitavastatin (LIVALO) 4 mg tab tablet Take 4 mg by mouth daily.  traZODone (DESYREL) 100 mg tablet Take 100 mg by mouth nightly.  warfarin (Coumadin) 5 mg tablet Take 5 mg by mouth daily.  metFORMIN (GLUCOPHAGE) 500 mg tablet Take 500 mg by mouth daily (with breakfast).  CALCIUM CARBONATE (CALCIUM 600) 1,500 (600) mg Tab Take 500 mg by mouth once.  cyanocobalamin (VITAMIN B-12) 2,000 mcg TbER Take 2,000 mcg by mouth once.  doxycycline hyclate (DORYX) 75 mg TbEC Take 100 mg by mouth daily.  omega-3 fatty acids-vitamin e (FISH OIL) 1,000 mg Cap Take 1 Cap by mouth daily.  cholecalciferol, vitamin d3, (VITAMIN D) 1,000 unit tablet Take  by mouth daily. No current facility-administered medications for this visit. Visit Vitals  /69   Pulse 76   Ht 5' 8\" (1.727 m)   Wt 91.2 kg (201 lb)   SpO2 94%   BMI 30.56 kg/m²         Physical Exam  Constitutional:       Appearance: She is well-developed. HENT:      Head: Normocephalic and atraumatic. Eyes:      Conjunctiva/sclera: Conjunctivae normal.   Neck:      Thyroid: No thyromegaly. Vascular: No JVD. Trachea: No tracheal deviation. Cardiovascular:      Rate and Rhythm: Normal rate and regular rhythm. Chest Wall: PMI is not displaced. Pulses: No decreased pulses. Heart sounds: Murmur heard.     Early systolic murmur is present at the upper right sternal border. No gallop. Pulmonary:      Effort: No respiratory distress. Breath sounds: No wheezing or rales. Chest:      Chest wall: No tenderness. Abdominal:      Palpations: Abdomen is soft. Tenderness: There is no abdominal tenderness. Musculoskeletal:      Cervical back: Neck supple. Skin:     General: Skin is warm. Neurological:      Mental Status: She is alert and oriented to person, place, and time. Ms. Rosy Robles has a reminder for a \"due or due soon\" health maintenance. I have asked that she contact her primary care provider for follow-up on this health maintenance. No flowsheet data found. Impression:cta-2014    Small amount of residual thrombus remains in the left lateral wall of the distal thoracic aorta.  The large luminal filling defect is no longer evident.  No new filling defects. Ulcerated plaque at the abdominal aorta at the level of the JOLLY posteriorly also stable in appearance. Mesenteric narrowings as above similar to prior exam.    Resolving splenic infarct. No recurrence of pancreatitis. Hepatic steatosis. Splenic and renal infarcts as seen previously. Interpretation Summary 9/2018  · Calculated left ventricular ejection fraction is 55%. Normal left ventricular wall motion, no regional wall motion abnormality noted. Mild (grade 1) left ventricular diastolic dysfunction. · Trace mitral valve regurgitation. · Trivial pericardial effusion. Effusion is is fibrinous. Procedure Conclusion 9/2018  Nuclear Stress Test   Negative myocardial perfusion imaging. Myocardial perfusion imaging supports a low risk stress test.   There is a prior study available for comparison. As compared to the previous study, there are no significant changes. Normal EF:% 57      Interpretation Summary   · Gated SPECT: Left ventricular function post-stress was normal. Calculated ejection fraction is 61%.   · Baseline ECG: Normal sinus rhythm. · Negative stress electrocardiogram.  · Left ventricular perfusion is normal.  · Negative myocardial perfusion imaging. Myocardial perfusion imaging supports a low risk stress test.     8/2021MRI     No acute intracranial abnormality. No mass, hemorrhage, or acute infarct. Mild chronic microvascular ischemic changes in the white matter. IMPRESSION8/2021CTA  1. Severe stenosis at the origin left vertebral artery. 2.  No intracranial occlusion or hemodynamically significant stenosis  identified. 3.  Mild atherosclerotic disease of the bilateral carotid bifurcations resulting  in approximately 20% stenosis of the right internal carotid artery. Interpretation Summary 8/2021    · LV: Estimated LVEF is 50 - 55%. Visually measured ejection fraction. Normal cavity size and wall thickness. Low normal systolic function. Assessment       ICD-10-CM ICD-9-CM    1. Atherosclerosis of native coronary artery of native heart with angina pectoris ([facility])  I25.119 414.01      413.9     Stable symptom continue treatment monitor   2. Old myocardial infarction  I25.2 412     Stable monitor   3. Hyperlipidemia, unspecified hyperlipidemia type  E78.5 272.4     Continue treatment lab with PCP   4. Postsurgical percutaneous transluminal coronary angioplasty status  Z98.61 V45.82     Stable   5. Hypercoagulable state ([facility] [facility]  D68.59 289.81     Continue monitoring with PCP   6. Aortic mural thrombus ([facility])  I74.10 444.1     Stable on anticoagulation INR followed by PCP   7. Stenosis of left vertebral artery  I65.02 433.20     Continue monitoring asymptomatic     10/2018  History of aortic mural thrombus on Coumadin followed by PCP. Recent concern on GI bleed being evaluated by Dr. Kimmy Brasher. Cardiac status remains stable    5/2019 - Cardiac status stable. Appendicitis in January, recovering well. Has stopped smoking!  Continue Warfarin for history of mural thrombus - INR followed by PCP.  12/2019  Cardiac status stable. Discussed use of PCSK9 inhibitor. Prescribed at this point. .  At small lung nodule on low-dose CT scan. Has follow-up evaluation pending . 9/2021  Seen after recent admission. Evaluation noted. Was in hospital multiple times last in [address] with dehydration syncope GI bleeding bloating. Had Campylobacter infection recovering slowly. Cardiac status is stable no evidence of MI in hospital echo ejection fraction 50 to 55%. Lipid profile with PCP.  3/2022  Stable cardiac status. Shortness of breath on exertion is stable. Continue medical manage  There are no discontinued medications. No orders of the defined types were placed in this encounter. Follow-up and Dispositions    · Return in about 6 months (around 9/30/2022).

## 2022-03-30 NOTE — PROGRESS NOTES
1. Have you been to the ER, urgent care clinic since your last visit? Hospitalized since your last visit? Yes When: 9/2021 Where: [facility]  Reason for visit: bacteria infection    2. Have you seen or consulted any other health care providers outside of the [address] [place] [address] since your last visit? Include any pap smears or colon screening. No     3. Since your last visit, have you had any of the following symptoms? shortness of breath.

## 2022-12-08 ENCOUNTER — OFFICE VISIT (OUTPATIENT)
Dept: CARDIOLOGY CLINIC | Age: 67
End: 2022-12-08
Payer: MEDICARE

## 2022-12-08 VITALS
HEART RATE: 54 BPM | HEIGHT: 68 IN | OXYGEN SATURATION: 96 % | BODY MASS INDEX: 30.16 KG/M2 | DIASTOLIC BLOOD PRESSURE: 61 MMHG | WEIGHT: 199 LBS | SYSTOLIC BLOOD PRESSURE: 102 MMHG

## 2022-12-08 DIAGNOSIS — I25.2 OLD MYOCARDIAL INFARCTION: ICD-10-CM

## 2022-12-08 DIAGNOSIS — D68.59 HYPERCOAGULABLE STATE (HCC): ICD-10-CM

## 2022-12-08 DIAGNOSIS — E78.5 HYPERLIPIDEMIA, UNSPECIFIED HYPERLIPIDEMIA TYPE: ICD-10-CM

## 2022-12-08 DIAGNOSIS — I25.119 ATHEROSCLEROSIS OF NATIVE CORONARY ARTERY OF NATIVE HEART WITH ANGINA PECTORIS (HCC): Primary | ICD-10-CM

## 2022-12-08 DIAGNOSIS — Z98.61 POSTSURGICAL PERCUTANEOUS TRANSLUMINAL CORONARY ANGIOPLASTY STATUS: ICD-10-CM

## 2022-12-08 RX ORDER — GUAIFENESIN 100 MG/5ML
81 LIQUID (ML) ORAL DAILY
COMMUNITY

## 2022-12-08 RX ORDER — DOXYCYCLINE 100 MG/1
100 CAPSULE ORAL 2 TIMES DAILY
COMMUNITY

## 2022-12-08 RX ORDER — PANTOPRAZOLE SODIUM 40 MG/1
40 TABLET, DELAYED RELEASE ORAL DAILY
COMMUNITY

## 2022-12-08 RX ORDER — DIAPER,BRIEF,INFANT-TODD,DISP
EACH MISCELLANEOUS
COMMUNITY

## 2022-12-08 RX ORDER — VITAMIN E (DL,TOCOPHERYL ACET) 180 MG
CAPSULE ORAL
COMMUNITY

## 2022-12-08 RX ORDER — ZINC GLUCONATE 10 MG
LOZENGE ORAL
COMMUNITY

## 2022-12-08 RX ORDER — OXYCODONE AND ACETAMINOPHEN 5; 325 MG/1; MG/1
TABLET ORAL
COMMUNITY

## 2022-12-08 NOTE — PROGRESS NOTES
HISTORY OF PRESENT ILLNESS  Dean Gamboa is a 78 y.o. female. Patient with cad,old mi,hypercoaguble state,old pci.    5/2019 - S/P Appendectomy 1/2019 - requiring transfusion of 3 units FFP  9/2021  Patient is here for follow-up. She was recently admitted to hospital 8/2021 and discharged finding included  1. Altered Mental Status: Christian Bautista is a [de-identified] year old female with a history of coronary artery disease, aortic mural thrombus, diabetes, and chronic pain who presented to [facility] following a period of incoherency and AMS that occurred just prior to arrival. This is in the setting of a two month history of intermittent periods of syncope and word finding difficulty, typically following doses of her Lyrica. Due to concerns for stroke, a code stroke was initiated and subsequent neuroimaging was negative for acute findings [although it did show left vertebral artery stenosis]. Based on her workup, her AMS was noted to be multifactorial: related to her recent initiation of her Lyrica, urinary tract infection, and orthostatic hypotension 2/2 bouts of diarrhea and decreased oral intake. Her lyrica was stopped and she was recommended to follow-up with her PCM in one week for re-assessment. 2. Hypotension: This was noted upon admission and she was noted to be orthostatic. Fluid resuscitation was initiated and her mental status and blood pressure improved. 3. UTI. Mild with improvements following x2 doses of IV ceftriaxone. About 2 weeks later she ended up in [address] [address] [facility] with similar symptoms. She was noted to have a GI infection with Campylobacter. She was given antibiotic. She had GI bleeding at that time. After treatment she has completely resolved. Alert awake no ongoing symptoms  3/2022  Patient seen today for follow-up    Follow-up  Pertinent negatives include no chest pain, no abdominal pain, no headaches and no shortness of breath.    Cholesterol Problem  The history is provided by the Patient. This is a chronic problem. The problem occurs constantly. Pertinent negatives include no chest pain, no abdominal pain, no headaches and no shortness of breath. Review of Systems   Constitutional:  Negative for chills and fever. HENT:  Negative for nosebleeds. Eyes:  Negative for blurred vision and double vision. Respiratory:  Negative for cough, hemoptysis, sputum production, shortness of breath and wheezing. Cardiovascular:  Negative for chest pain, palpitations, orthopnea, claudication, leg swelling and PND. Gastrointestinal:  Negative for abdominal pain, heartburn, nausea and vomiting. Musculoskeletal:  Negative for myalgias. Skin:  Negative for rash. Neurological:  Negative for dizziness, weakness and headaches. Endo/Heme/Allergies:  Does not bruise/bleed easily. Family History   Problem Relation Age of Onset    Cancer Mother     Heart Disease Mother     Cancer Father     Hypertension Other         essential    Heart Attack Other         myocardial infarction    Other Other         Hypercholesterolemia    Cancer Sister        Past Medical History:   Diagnosis Date    Anemia     Aortic mural thrombus ([address] 11/24/2014    on coumadin followed by dr Nishi Vazquez     Aortic thrombus ([facility] 03/29/13    CAD (coronary artery disease)     Clot     Aortic clot    Clotting disorder ([address]     Coronary atherosclerosis of native coronary artery     Stable, 18 mos. Since pce, will d/c effient    Diabetes mellitus     Gastrointestinal hemorrhage 6/25/2018    No clear etiology of GI bleed. Recent workup noted    Heart abnormalities     Hypercholesterolemia     Hypercoagulable state ([address] 6/25/2018    Maintain on Coumadin. INR followed by PCP.     Hyperlipidemia     Iron deficiency     MI (myocardial infarction) ([address]     Old myocardial infarction     Post RCA PCI    Other and unspecified hyperlipidemia     On multiple meds, pt had side effect of 28 differnet meds, currently on lipofen and livalo Postsurgical percutaneous transluminal coronary angioplasty status     stable    Postsurgical percutaneous transluminal coronary angioplasty status     Raynaud's disease     Screening for depression     Splenic infarction 13    Ulcer        Past Surgical History:   Procedure Laterality Date    FLEXIBLE SIGMOIDOSCOPY N/A 2018    SIGMOIDOSCOPY FLEXIBLE performed by Lena Gonzalez MD at [address]       delivery    HX COLONOSCOPY      HX CORONARY STENT PLACEMENT      RCA, VALDEZ    HX GYN      HX HEENT      HX HYSTERECTOMY      abdominal    HX OOPHORECTOMY      1 removed    HX ORTHOPAEDIC      right shoulder surgery    HX TONSILLECTOMY      ID CARDIAC SURG PROCEDURE UNLIST      ID LAP,APPENDECTOMY N/A 2019    Dr. Newby Born History     Tobacco Use    Smoking status: Former     Packs/day: 2.50     Years: 40.00     Pack years: 100.00     Types: Cigarettes     Quit date: 2019     Years since quitting: 3.9    Smokeless tobacco: Never    Tobacco comments:     working on quiting; chewing gum and using patch   Substance Use Topics    Alcohol use: No     Comment: very rare       Allergies   Allergen Reactions    Latex Other (comments)     Blisters and bleeding from blisters. Nitroglycerin Other (comments)     Patient states \"that when given medication last time she went into arrest\"    Morphine Shortness of Breath    Sulfa (Sulfonamide Antibiotics) Nausea Only    Tetanus Vaccines And Toxoid Swelling and Other (comments)     fever       Current Outpatient Medications   Medication Sig    doxycycline (MONODOX) 100 mg capsule Take 100 mg by mouth two (2) times a day. oxyCODONE-acetaminophen (PERCOCET) 5-325 mg per tablet Take  by mouth every four (4) hours as needed for Pain.    pantoprazole (PROTONIX) 40 mg tablet Take 40 mg by mouth daily. aspirin 81 mg chewable tablet Take 81 mg by mouth daily.     biotin 10,000 mcg cap Take  by mouth.    iron julio,ps-FA-B-C#12-succ 65 mg-65 mg -1,000 mcg (24) tab Take  by mouth.    magnesium 250 mg tab Take  by mouth. turmeric/turmeric ext/pepr ext (turmeric-turmeric ext-pepper) 500-3 mg cap Take  by mouth. acetaminophen (TYLENOL) 325 mg tablet Take 2 Tablets by mouth every six (6) hours as needed (pain). buPROPion XL (WELLBUTRIN XL) 300 mg XL tablet Take 300 mg by mouth daily. Indications: anxiousness associated with depression    ezetimibe (ZETIA) 10 mg tablet Take 10 mg by mouth.    esomeprazole (NEXIUM) 40 mg capsule Take 40 mg by mouth daily. torsemide (DEMADEX) 20 mg tablet Take  by mouth daily. pioglitazone (ACTOS) 30 mg tablet Take  by mouth daily. metoprolol tartrate (LOPRESSOR) 25 mg tablet Take 0.5 Tabs by mouth two (2) times a day. co-enzyme Q-10 (CO Q-10) 100 mg capsule Take 100 mg by mouth daily. pitavastatin calcium (LIVALO) 4 mg tab tablet Take 4 mg by mouth daily. traZODone (DESYREL) 100 mg tablet Take 100 mg by mouth nightly. warfarin (COUMADIN) 5 mg tablet Take 5 mg by mouth daily. metFORMIN (GLUCOPHAGE) 500 mg tablet Take 500 mg by mouth daily (with breakfast). calcium carbonate 600 mg calcium (1,500 mg) tablet Take 500 mg by mouth once. cyanocobalamin 2,000 mcg TbER Take 2,000 mcg by mouth once. omega-3 fatty acids-vitamin e 1,000 mg cap Take 1 Cap by mouth daily. cholecalciferol (VITAMIN D3) (1000 Units /25 mcg) tablet Take  by mouth daily. clonazePAM (KlonoPIN) 0.5 mg tablet Take 0.5 mg by mouth every twelve (12) hours as needed for Anxiety. No current facility-administered medications for this visit. Visit Vitals  /61 (BP 1 Location: Left upper arm, BP Patient Position: Sitting)   Pulse (!) 54   Ht 5' 8\" (1.727 m)   Wt 90.3 kg (199 lb)   SpO2 96%   BMI 30.26 kg/m²         Physical Exam  Constitutional:       Appearance: She is well-developed. HENT:      Head: Normocephalic and atraumatic.    Eyes:      Conjunctiva/sclera: Conjunctivae normal.   Neck:      Thyroid: No thyromegaly. Vascular: No JVD. Trachea: No tracheal deviation. Cardiovascular:      Rate and Rhythm: Normal rate and regular rhythm. Chest Wall: PMI is not displaced. Pulses: No decreased pulses. Heart sounds: Murmur heard. Early systolic murmur is present at the upper right sternal border. No gallop. Pulmonary:      Effort: No respiratory distress. Breath sounds: No wheezing or rales. Chest:      Chest wall: No tenderness. Abdominal:      Palpations: Abdomen is soft. Tenderness: There is no abdominal tenderness. Musculoskeletal:      Cervical back: Neck supple. Skin:     General: Skin is warm. Neurological:      Mental Status: She is alert and oriented to person, place, and time. Ms. Lety Arana has a reminder for a \"due or due soon\" health maintenance. I have asked that she contact her primary care provider for follow-up on this health maintenance. No flowsheet data found. Impression:cta-2014    Small amount of residual thrombus remains in the left lateral wall of the distal thoracic aorta. The large luminal filling defect is no longer evident. No new filling defects. Ulcerated plaque at the abdominal aorta at the level of the JOLLY posteriorly also stable in appearance. Mesenteric narrowings as above similar to prior exam.    Resolving splenic infarct. No recurrence of pancreatitis. Hepatic steatosis. Splenic and renal infarcts as seen previously. Interpretation Summary 9/2018  Calculated left ventricular ejection fraction is 55%. Normal left ventricular wall motion, no regional wall motion abnormality noted. Mild (grade 1) left ventricular diastolic dysfunction. Trace mitral valve regurgitation. Trivial pericardial effusion. Effusion is is fibrinous. Procedure Conclusion 9/2018  Nuclear Stress Test   Negative myocardial perfusion imaging.  Myocardial perfusion imaging supports a low risk stress test.   There is a prior study available for comparison. As compared to the previous study, there are no significant changes. Normal EF:% 57      Interpretation Summary   Gated SPECT: Left ventricular function post-stress was normal. Calculated ejection fraction is 61%. Baseline ECG: Normal sinus rhythm. Negative stress electrocardiogram.  Left ventricular perfusion is normal.  Negative myocardial perfusion imaging. Myocardial perfusion imaging supports a low risk stress test.     8/2021-MRI     No acute intracranial abnormality. No mass, hemorrhage, or acute infarct. Mild chronic microvascular ischemic changes in the white matter. IMPRESSION-8/2021-CTA  1. Severe stenosis at the origin left vertebral artery. 2.  No intracranial occlusion or hemodynamically significant stenosis  identified. 3.  Mild atherosclerotic disease of the bilateral carotid bifurcations resulting  in approximately 20% stenosis of the right internal carotid artery. Interpretation Summary 8/2021    LV: Estimated LVEF is 50 - 55%. Visually measured ejection fraction. Normal cavity size and wall thickness. Low normal systolic function. Assessment       ICD-10-CM ICD-9-CM    1. Atherosclerosis of native coronary artery of native heart with angina pectoris (HCC)  I25.119 414.01      413.9     Stable continue treatment monitor      2. Old myocardial infarction  I25.2 412     Stable monitor      3. Hyperlipidemia, unspecified hyperlipidemia type  E78.5 272.4     Continue treatment lab with PCP      4. Postsurgical percutaneous transluminal coronary angioplasty status  Z98.61 V45.82     Stable      5. Hypercoagulable state [facility])  D68.59 289.81     Continue treatment monitor with hematology        10/2018  History of aortic mural thrombus on Coumadin followed by PCP. Recent concern on GI bleed being evaluated by Dr. Omari Hubbard. Cardiac status remains stable    5/2019 - Cardiac status stable.   Appendicitis in January, recovering well. Has stopped smoking! Continue Warfarin for history of mural thrombus - INR followed by PCP.  12/2019  Cardiac status stable. Discussed use of PCSK9 inhibitor. Prescribed at this point. .  At small lung nodule on low-dose CT scan. Has follow-up evaluation pending . 9/2021  Seen after recent admission. Evaluation noted. Was in hospital multiple times last in [address] with dehydration syncope GI bleeding bloating. Had Campylobacter infection recovering slowly. Cardiac status is stable no evidence of MI in hospital echo ejection fraction 50 to 55%. Lipid profile with PCP.  3/2022  Stable cardiac status. Shortness of breath on exertion is stable. Continue medical manage  12/2022  Stable cardiac status continue medical management. Maintained on anticoagulation for hypercoagulable state. INR is monitored by PCP. Medications Discontinued During This Encounter   Medication Reason    calcium polycarbophil (FIBERCON PO) Not A Current Medication    glimepiride (AMARYL) 2 mg tablet Not A Current Medication    senna-docusate (PERICOLACE) 8.6-50 mg per tablet Not A Current Medication    doxycycline hyclate 75 mg TbEC Not A Current Medication       No orders of the defined types were placed in this encounter. Follow-up and Dispositions    Return in about 6 months (around 6/8/2023).

## 2023-07-19 NOTE — PROGRESS NOTES
"Subjective:      Sagrario Hunt is a 10 y.o. male who was brought in for this well child visit by mother.    Have there been any significant history changes, ER visits or admissions in past year? No    Current Concerns:  Rash in the bend of arm, Allergies    Review of Nutrition:  Current diet: milk,juice,water,fruits, vegetable,meat,fish  Balanced diet: Yes  Stooling concerns: yes  Stooling habits: 1 time daily, really large     Review of Sleep:  Sleep/wake schedule: wake up at 6-6:30 am, go to sleep at  9 pm  Does patient snore? no  Napping after school?: No    Social Screening:  Lives with: mother and brothers (3)  Secondhand smoke exposure? no  Changes/stressors at home? No  School grade: 4th  Concerns regarding behavior: no  Concerns regarding learning: no  Teacher concerns: no    Oral Health:  Brushing teeth twice daily: Yes  Existing dental home: Yes  Drinks fluoridated water: Yes    Safety:   Working smoke alarm: Yes  Guns in home: No  Seatbelt use: Yes    Screening Questions:  Hours of screen time per day: > 5 hours  Physical activity/extracurricular activities: playing,running,jumping  Menses? N/A    Hearing Screening    125Hz 250Hz 500Hz 1000Hz 2000Hz 3000Hz 4000Hz 5000Hz 6000Hz 8000Hz   Right ear Pass Pass Pass Pass Pass Pass Pass Pass Pass Pass   Left ear Pass Pass Pass Pass Pass Pass Pass Pass Pass Pass       Growth parameters: Noted and is normal weight for age.    Objective:     Vitals:    07/19/23 0838   BP: 108/67   BP Location: Right arm   Patient Position: Sitting   BP Method: Pediatric (Automatic)   Pulse: 84   Resp: 20   Temp: 98.4 °F (36.9 °C)   SpO2: 99%   Weight: 39.1 kg (86 lb 4 oz)   Height: 4' 9.25" (1.454 m)       Physical Exam  Skin:            Comments: Mild eczema     Constitutional: alert, no acute distress, undressed  Head: Normocephalic  Eyes: EOM intact, pupil round and reactive to light  Ears: Normal TMs bilaterally  Nose: normal mucosa, no deformity  Throat: Normal mucosa + " \"Important Message from [employer]" reviewed and explained with the patient and/or representative at bedside and signature was obtained. A signed copy provided to patient/representative. Original signed document placed in patient's chart. NOEL ArmendarizN, RN Pager # [phone] Care Manager "oropharynx. No palate abnormalities  Neck: Symmetrical, no masses, normal clavicles  Chest/breast: Normal palpation of breasts & axillae, no masses or lumps, no tenderness, no chest deformity  Respiratory: Chest movement symmetrical, clear to auscultation bilaterally  Cardiac: [place] beat normal, normal rhythm, S1+S2, no murmurs  Vascular: Normal femoral pulses  Gastrointestinal: soft, non-tender; bowel sounds normal; no masses,  no organomegaly  : normal male - testes descended bilaterally and circumcised, nicola stage 1  MSK: extremities normal, atraumatic, no cyanosis or edema, back no scoliosis present  Skin: Scalp normal, no rashes  Neurological: grossly neurologically intact, normal reflexes    Assessment:     Healthy 10 y.o. male childKimberly Zabala was seen today for well child.    Diagnoses and all orders for this visit:    Encounter for well child check without abnormal findings    Auditory acuity evaluation  -     Hearing screen    Visual testing  -     Visual acuity screening    Eczema, unspecified type        Plan:     Anticipatory Guidance   - Discussed and/or provided information on the following:   SCHOOL: School performances; homework; bullying   DEVELOPMENT/MENTAL HEALTH: Emotional security and self-esteem; family communication and family time; temper problems and setting reasonable limits; friends; school performance; readiness for middle school; sexuality (pubertal onset, personal hygiene, initiation of growth spurt, menstruation and ejaculation, loss of "baby fat" and accretion of muscle, sexual safety)   NUTRITION: Weight concerns; body image; importance of breakfast; limits on high-fat foods; water rather than soda and juice; eating as a family; physical activity   ORAL HEALTH: Regular visits with dentist; daily brushing and flossing; adequate fluoride   SAFETY: Safety belts; helmets; bicycle safety; swimming; sunscreen; tobacco/alcohol/drugs; knowing child's friends and families; supervision of " child with friends; guns     - Immunizations? No    - Cholesterol Screening (age 9-11): Low risk    - Next well visit in 1 year or sooner if any concerns   -discussed plan for eczema

## 2023-08-30 ENCOUNTER — OFFICE VISIT (OUTPATIENT)
Age: 68
End: 2023-08-30
Payer: MEDICARE

## 2023-08-30 VITALS
SYSTOLIC BLOOD PRESSURE: 115 MMHG | HEIGHT: 68 IN | HEART RATE: 66 BPM | OXYGEN SATURATION: 95 % | BODY MASS INDEX: 33.34 KG/M2 | DIASTOLIC BLOOD PRESSURE: 66 MMHG | WEIGHT: 220 LBS

## 2023-08-30 DIAGNOSIS — I25.2 OLD MYOCARDIAL INFARCTION: ICD-10-CM

## 2023-08-30 DIAGNOSIS — Z98.61 CORONARY ANGIOPLASTY STATUS: ICD-10-CM

## 2023-08-30 DIAGNOSIS — E78.5 HYPERLIPIDEMIA, UNSPECIFIED HYPERLIPIDEMIA TYPE: ICD-10-CM

## 2023-08-30 DIAGNOSIS — I65.02 OCCLUSION AND STENOSIS OF LEFT VERTEBRAL ARTERY: ICD-10-CM

## 2023-08-30 DIAGNOSIS — I25.119 ATHEROSCLEROSIS OF NATIVE CORONARY ARTERY OF NATIVE HEART WITH ANGINA PECTORIS (HCC): Primary | ICD-10-CM

## 2023-08-30 DIAGNOSIS — D68.59 HYPERCOAGULABLE STATE (HCC): ICD-10-CM

## 2023-08-30 PROCEDURE — G8417 CALC BMI ABV UP PARAM F/U: HCPCS | Performed by: INTERNAL MEDICINE

## 2023-08-30 PROCEDURE — 4004F PT TOBACCO SCREEN RCVD TLK: CPT | Performed by: INTERNAL MEDICINE

## 2023-08-30 PROCEDURE — 3017F COLORECTAL CA SCREEN DOC REV: CPT | Performed by: INTERNAL MEDICINE

## 2023-08-30 PROCEDURE — G8427 DOCREV CUR MEDS BY ELIG CLIN: HCPCS | Performed by: INTERNAL MEDICINE

## 2023-08-30 PROCEDURE — G8399 PT W/DXA RESULTS DOCUMENT: HCPCS | Performed by: INTERNAL MEDICINE

## 2023-08-30 PROCEDURE — 1123F ACP DISCUSS/DSCN MKR DOCD: CPT | Performed by: INTERNAL MEDICINE

## 2023-08-30 PROCEDURE — 99214 OFFICE O/P EST MOD 30 MIN: CPT | Performed by: INTERNAL MEDICINE

## 2023-08-30 PROCEDURE — 1090F PRES/ABSN URINE INCON ASSESS: CPT | Performed by: INTERNAL MEDICINE

## 2023-09-22 ENCOUNTER — TELEPHONE (OUTPATIENT)
Age: 68
End: 2023-09-22

## 2023-09-22 RX ORDER — EVOLOCUMAB 140 MG/ML
1 INJECTION, SOLUTION SUBCUTANEOUS
Qty: 2 ADJUSTABLE DOSE PRE-FILLED PEN SYRINGE | Refills: 11 | Status: SHIPPED | OUTPATIENT
Start: 2023-09-22

## 2023-09-22 NOTE — TELEPHONE ENCOUNTER
PA for Jaspal Duggan has been approved from 1/1/2023 through 1/31/2023. Patient and pharmacy have been made aware.

## 2024-02-12 ENCOUNTER — OFFICE VISIT (OUTPATIENT)
Age: 69
End: 2024-02-12
Payer: MEDICARE

## 2024-02-12 VITALS
BODY MASS INDEX: 30.52 KG/M2 | HEART RATE: 74 BPM | OXYGEN SATURATION: 98 % | SYSTOLIC BLOOD PRESSURE: 106 MMHG | WEIGHT: 201.4 LBS | DIASTOLIC BLOOD PRESSURE: 70 MMHG | HEIGHT: 68 IN

## 2024-02-12 DIAGNOSIS — D68.59 HYPERCOAGULABLE STATE (HCC): ICD-10-CM

## 2024-02-12 DIAGNOSIS — I25.2 OLD MYOCARDIAL INFARCTION: ICD-10-CM

## 2024-02-12 DIAGNOSIS — Z98.61 CORONARY ANGIOPLASTY STATUS: ICD-10-CM

## 2024-02-12 DIAGNOSIS — E78.5 HYPERLIPIDEMIA, UNSPECIFIED HYPERLIPIDEMIA TYPE: ICD-10-CM

## 2024-02-12 DIAGNOSIS — I25.119 ATHEROSCLEROSIS OF NATIVE CORONARY ARTERY OF NATIVE HEART WITH ANGINA PECTORIS (HCC): Primary | ICD-10-CM

## 2024-02-12 PROCEDURE — 3017F COLORECTAL CA SCREEN DOC REV: CPT | Performed by: INTERNAL MEDICINE

## 2024-02-12 PROCEDURE — G8417 CALC BMI ABV UP PARAM F/U: HCPCS | Performed by: INTERNAL MEDICINE

## 2024-02-12 PROCEDURE — G8484 FLU IMMUNIZE NO ADMIN: HCPCS | Performed by: INTERNAL MEDICINE

## 2024-02-12 PROCEDURE — 99214 OFFICE O/P EST MOD 30 MIN: CPT | Performed by: INTERNAL MEDICINE

## 2024-02-12 PROCEDURE — 1090F PRES/ABSN URINE INCON ASSESS: CPT | Performed by: INTERNAL MEDICINE

## 2024-02-12 PROCEDURE — 1123F ACP DISCUSS/DSCN MKR DOCD: CPT | Performed by: INTERNAL MEDICINE

## 2024-02-12 PROCEDURE — G8399 PT W/DXA RESULTS DOCUMENT: HCPCS | Performed by: INTERNAL MEDICINE

## 2024-02-12 PROCEDURE — G8428 CUR MEDS NOT DOCUMENT: HCPCS | Performed by: INTERNAL MEDICINE

## 2024-02-12 PROCEDURE — 1036F TOBACCO NON-USER: CPT | Performed by: INTERNAL MEDICINE

## 2024-02-12 RX ORDER — MULTIVITAMIN WITH IRON
250 TABLET ORAL DAILY
COMMUNITY

## 2024-02-12 RX ORDER — SEMAGLUTIDE 0.68 MG/ML
INJECTION, SOLUTION SUBCUTANEOUS
COMMUNITY

## 2024-02-12 RX ORDER — CALCIUM CARBONATE 500(1250)
500 TABLET ORAL DAILY
COMMUNITY

## 2024-02-12 NOTE — PROGRESS NOTES
HISTORY OF PRESENT ILLNESS  Naomie Wilson is a 67 y.o. female.    Patient with cad,old mi,hypercoaguble state,old pci.    5/2019 - S/P Appendectomy 1/2019 - requiring transfusion of 3 units FFP  9/2021  Patient is here for follow-up.  She was recently admitted to hospital 8/2021 and discharged finding included  1. Altered Mental Status: Naomie Wilson is a [age] year old female with a history of coronary artery disease, aortic mural thrombus, diabetes, and chronic pain who presented to [facility] following a period of incoherency and AMS that occurred just prior to arrival. This is in the setting of a two month history of intermittent periods of syncope and word finding difficulty, typically following doses of her Lyrica. Due to concerns for stroke, a code stroke was initiated and subsequent neuroimaging was negative for acute findings [although it did show left vertebral artery stenosis]. Based on her workup, her AMS was noted to be multifactorial: related to her recent initiation of her Lyrica, urinary tract infection, and orthostatic hypotension 2/2 bouts of diarrhea and decreased oral intake. Her lyrica was stopped and she was recommended to follow-up with her PCM in one week for re-assessment.      2. Hypotension: This was noted upon admission and she was noted to be orthostatic. Fluid resuscitation was initiated and her mental status and blood pressure improved.      3. UTI. Mild with improvements following x2 doses of IV ceftriaxone.   About 2 weeks later she ended up in [facility] with similar symptoms.  She was noted to have a GI infection with Campylobacter.  She was given antibiotic.  She had GI bleeding at that time.  After treatment she has completely resolved.  Alert awake no ongoing symptoms  3/2022  Patient seen today for follow-up    Follow-up  Pertinent negatives include no chest pain, no abdominal pain, no headaches and no shortness of breath.   Cholesterol Problem  The history is provided by

## 2024-02-12 NOTE — PROGRESS NOTES
1. Have you been to the ER, urgent care clinic since your last visit?  Hospitalized since your last visit?No    2. Have you seen or consulted any other health care providers outside of the [facility] since your last visit?  Include any pap smears or colon screening. No

## 2024-09-29 NOTE — PROGRESS NOTES
HISTORY OF PRESENT ILLNESS  Naomie Wilson is a 68 y.o. female.    PMH Aortic mural thrombus on coumadin, Coronary artery disease history of PCI, Coronary risk equivalent DM, HLD, Hypercoaguable state, HLD, Raynaud's disease  ----  CARDIAC STUDIES  ----  2d echo 8/23/2021  · LV: Estimated LVEF is 50 - 55%. Visually measured ejection fraction. Normal cavity size and wall thickness. Low normal systolic function.   ----  Nuclear stress test 8/22/2018  · Gated SPECT: Left ventricular function post-stress was normal. Calculated ejection fraction is 61%.  · Baseline ECG: Normal sinus rhythm.  · Negative stress electrocardiogram.  · Left ventricular perfusion is normal.  · Negative myocardial perfusion imaging. Myocardial perfusion imaging supports a low risk stress test.  ----  2d echo 8/22/2018   Calculated left ventricular ejection fraction is 55%. Normal left ventricular wall motion, no regional wall motion abnormality noted. Mild (grade 1) left ventricular diastolic dysfunction.  · Trace mitral valve regurgitation.  · Trivial pericardial effusion. Effusion is is fibrinous.  -----      Have you had Fatigue?  No   2.   Have you had have you had Chest Pain? No   3.   Have you had Dyspnea (SOB) ? No   4.   Have you had Orthopnea? No   5.   Have you had PND? No   6.   Have you had leg swelling? No   7.    Have you had any weight gain? No   8. Have you had any palpitations? No   9. Have you had any syncope? No   10. Do you have any wounds on legs?no      Family History   Problem Relation Age of Onset    Cancer Sister     Other Other         Hypercholesterolemia    Heart Attack Other         myocardial infarction    Hypertension Other         essential    Cancer Father     Heart Disease Mother     Cancer Mother        Past Medical History:   Diagnosis Date    Anemia     Aortic mural thrombus (HCC) 11/24/2014    on coumadin followed by dr Gorman     Aortic thrombus (HCC) 03/29/13    CAD (coronary artery disease)     Clot

## 2024-10-01 RX ORDER — EVOLOCUMAB 140 MG/ML
1 INJECTION, SOLUTION SUBCUTANEOUS
Qty: 2 ADJUSTABLE DOSE PRE-FILLED PEN SYRINGE | Refills: 11 | Status: SHIPPED | OUTPATIENT
Start: 2024-10-01

## 2024-10-11 ENCOUNTER — OFFICE VISIT (OUTPATIENT)
Age: 69
End: 2024-10-11
Payer: MEDICARE

## 2024-10-11 VITALS
SYSTOLIC BLOOD PRESSURE: 104 MMHG | WEIGHT: 191 LBS | BODY MASS INDEX: 28.95 KG/M2 | HEIGHT: 68 IN | DIASTOLIC BLOOD PRESSURE: 68 MMHG | HEART RATE: 74 BPM

## 2024-10-11 DIAGNOSIS — I25.119 ATHEROSCLEROSIS OF NATIVE CORONARY ARTERY OF NATIVE HEART WITH ANGINA PECTORIS (HCC): Primary | ICD-10-CM

## 2024-10-11 DIAGNOSIS — E78.49 OTHER HYPERLIPIDEMIA: ICD-10-CM

## 2024-10-11 DIAGNOSIS — Z86.2 H/O HYPERCOAGULABLE STATE: ICD-10-CM

## 2024-10-11 DIAGNOSIS — I50.32 CHRONIC HEART FAILURE WITH PRESERVED EJECTION FRACTION (HCC): ICD-10-CM

## 2024-10-11 PROCEDURE — 1123F ACP DISCUSS/DSCN MKR DOCD: CPT | Performed by: INTERNAL MEDICINE

## 2024-10-11 PROCEDURE — G8484 FLU IMMUNIZE NO ADMIN: HCPCS | Performed by: INTERNAL MEDICINE

## 2024-10-11 PROCEDURE — 1090F PRES/ABSN URINE INCON ASSESS: CPT | Performed by: INTERNAL MEDICINE

## 2024-10-11 PROCEDURE — 93000 ELECTROCARDIOGRAM COMPLETE: CPT | Performed by: INTERNAL MEDICINE

## 2024-10-11 PROCEDURE — 1036F TOBACCO NON-USER: CPT | Performed by: INTERNAL MEDICINE

## 2024-10-11 PROCEDURE — 99214 OFFICE O/P EST MOD 30 MIN: CPT | Performed by: INTERNAL MEDICINE

## 2024-10-11 PROCEDURE — 3017F COLORECTAL CA SCREEN DOC REV: CPT | Performed by: INTERNAL MEDICINE

## 2024-10-11 PROCEDURE — G8428 CUR MEDS NOT DOCUMENT: HCPCS | Performed by: INTERNAL MEDICINE

## 2024-10-11 PROCEDURE — G8399 PT W/DXA RESULTS DOCUMENT: HCPCS | Performed by: INTERNAL MEDICINE

## 2024-10-11 PROCEDURE — G8417 CALC BMI ABV UP PARAM F/U: HCPCS | Performed by: INTERNAL MEDICINE

## 2024-10-11 RX ORDER — METOPROLOL TARTRATE 25 MG/1
12.5 TABLET, FILM COATED ORAL 2 TIMES DAILY
Qty: 30 TABLET | Refills: 5 | Status: SHIPPED | OUTPATIENT
Start: 2024-10-11

## 2024-10-11 ASSESSMENT — ENCOUNTER SYMPTOMS
CONSTIPATION: 0
BLOOD IN STOOL: 0
NAUSEA: 0
CHEST TIGHTNESS: 0
WHEEZING: 0
APNEA: 0
COLOR CHANGE: 0
DIARRHEA: 0
ABDOMINAL PAIN: 0
SHORTNESS OF BREATH: 0
COUGH: 0

## 2025-03-13 ENCOUNTER — TRANSCRIBE ORDERS (OUTPATIENT)
Facility: HOSPITAL | Age: 70
End: 2025-03-13

## 2025-03-13 DIAGNOSIS — Z12.31 ENCOUNTER FOR SCREENING MAMMOGRAM FOR MALIGNANT NEOPLASM OF BREAST: Primary | ICD-10-CM

## 2025-07-03 RX ORDER — METOPROLOL TARTRATE 25 MG/1
TABLET, FILM COATED ORAL
Qty: 30 TABLET | Refills: 4 | Status: SHIPPED | OUTPATIENT
Start: 2025-07-03

## 2025-07-16 ENCOUNTER — OFFICE VISIT (OUTPATIENT)
Age: 70
End: 2025-07-16
Payer: MEDICARE

## 2025-07-16 ENCOUNTER — HOSPITAL ENCOUNTER (OUTPATIENT)
Facility: HOSPITAL | Age: 70
Discharge: HOME | End: 2025-07-19
Payer: MEDICARE

## 2025-07-16 VITALS
BODY MASS INDEX: 28.79 KG/M2 | SYSTOLIC BLOOD PRESSURE: 110 MMHG | DIASTOLIC BLOOD PRESSURE: 68 MMHG | HEART RATE: 62 BPM | WEIGHT: 190 LBS | HEIGHT: 68 IN | OXYGEN SATURATION: 95 %

## 2025-07-16 DIAGNOSIS — I25.119 ATHEROSCLEROSIS OF NATIVE CORONARY ARTERY OF NATIVE HEART WITH ANGINA PECTORIS: ICD-10-CM

## 2025-07-16 DIAGNOSIS — I25.10 CORONARY ARTERY DISEASE DUE TO LIPID RICH PLAQUE: ICD-10-CM

## 2025-07-16 DIAGNOSIS — E78.49 OTHER HYPERLIPIDEMIA: ICD-10-CM

## 2025-07-16 DIAGNOSIS — I25.83 CORONARY ARTERY DISEASE DUE TO LIPID RICH PLAQUE: ICD-10-CM

## 2025-07-16 DIAGNOSIS — I50.32 CHRONIC HEART FAILURE WITH PRESERVED EJECTION FRACTION (HCC): Primary | ICD-10-CM

## 2025-07-16 LAB
CHOLEST SERPL-MCNC: 236 MG/DL
HDLC SERPL-MCNC: 49 MG/DL (ref 40–60)
HDLC SERPL: 4.8 (ref 0–5)
LDLC SERPL CALC-MCNC: 119 MG/DL (ref 0–100)
TRIGL SERPL-MCNC: 339 MG/DL (ref 0–150)
VLDLC SERPL CALC-MCNC: 68 MG/DL

## 2025-07-16 PROCEDURE — G8417 CALC BMI ABV UP PARAM F/U: HCPCS | Performed by: INTERNAL MEDICINE

## 2025-07-16 PROCEDURE — 3017F COLORECTAL CA SCREEN DOC REV: CPT | Performed by: INTERNAL MEDICINE

## 2025-07-16 PROCEDURE — G8399 PT W/DXA RESULTS DOCUMENT: HCPCS | Performed by: INTERNAL MEDICINE

## 2025-07-16 PROCEDURE — 1159F MED LIST DOCD IN RCRD: CPT | Performed by: INTERNAL MEDICINE

## 2025-07-16 PROCEDURE — 1123F ACP DISCUSS/DSCN MKR DOCD: CPT | Performed by: INTERNAL MEDICINE

## 2025-07-16 PROCEDURE — 1125F AMNT PAIN NOTED PAIN PRSNT: CPT | Performed by: INTERNAL MEDICINE

## 2025-07-16 PROCEDURE — 1036F TOBACCO NON-USER: CPT | Performed by: INTERNAL MEDICINE

## 2025-07-16 PROCEDURE — 99214 OFFICE O/P EST MOD 30 MIN: CPT | Performed by: INTERNAL MEDICINE

## 2025-07-16 PROCEDURE — 1090F PRES/ABSN URINE INCON ASSESS: CPT | Performed by: INTERNAL MEDICINE

## 2025-07-16 PROCEDURE — 80061 LIPID PANEL: CPT

## 2025-07-16 PROCEDURE — G8427 DOCREV CUR MEDS BY ELIG CLIN: HCPCS | Performed by: INTERNAL MEDICINE

## 2025-07-16 PROCEDURE — 36415 COLL VENOUS BLD VENIPUNCTURE: CPT

## 2025-07-16 ASSESSMENT — PATIENT HEALTH QUESTIONNAIRE - PHQ9
1. LITTLE INTEREST OR PLEASURE IN DOING THINGS: NOT AT ALL
SUM OF ALL RESPONSES TO PHQ QUESTIONS 1-9: 0
SUM OF ALL RESPONSES TO PHQ QUESTIONS 1-9: 0
2. FEELING DOWN, DEPRESSED OR HOPELESS: NOT AT ALL
SUM OF ALL RESPONSES TO PHQ QUESTIONS 1-9: 0
SUM OF ALL RESPONSES TO PHQ QUESTIONS 1-9: 0

## 2025-07-16 NOTE — PATIENT INSTRUCTIONS
Learning About the Mediterranean Diet  What is the Mediterranean diet?     The Mediterranean diet is a style of eating rather than a diet plan. It features foods eaten in [place], [place], southern [place], and other countries along the [place] Sea. It emphasizes eating foods like fish, fruits, vegetables, beans, high-fiber breads and whole grains, nuts, and olive oil. This style of eating includes limited red meat, cheese, and sweets.  Why choose the Mediterranean diet?  A Mediterranean-style diet may improve heart health. It contains more fat than other heart-healthy diets. But the fats are mainly from nuts, unsaturated oils (such as fish oils and olive oil), and certain nut or seed oils (such as canola, soybean, or flaxseed oil). These fats may help protect the heart and blood vessels.  How can you get started on the Mediterranean diet?  Here are some things you can do to switch to a more Mediterranean way of eating.  What to eat  Eat a variety of fruits and vegetables each day, such as grapes, blueberries, tomatoes, broccoli, peppers, figs, olives, spinach, eggplant, beans, lentils, and chickpeas.  Eat a variety of whole-grain foods each day, such as oats, brown rice, and whole wheat bread, pasta, and couscous.  Eat fish at least 2 times a week. Try tuna, salmon, mackerel, lake trout, herring, or sardines.  Eat moderate amounts of low-fat dairy products, such as milk, cheese, or yogurt.  Eat moderate amounts of poultry and eggs.  Choose healthy (unsaturated) fats, such as nuts, olive oil, and certain nut or seed oils like canola, soybean, and flaxseed.  Limit unhealthy (saturated) fats, such as butter, palm oil, and coconut oil. And limit fats found in animal products, such as meat and dairy products made with whole milk. Try to eat red meat only a few times a month in very small amounts.  Limit sweets and desserts to only a few times a week. This includes sugar-sweetened drinks like soda.  The

## 2025-07-16 NOTE — PROGRESS NOTES
Have you had fatigue?  Yes   2.   Have you had chest pain? No  3.   Have you had dyspnea (SOB)?  No  How much activity does it take before you become SOB?  4.   Have you had orthopnea? No  5.   Have you had PND? No    6.   Have you had leg swelling?   Yes  7.   Have you had any weight gain?  No  8.   Have you had any palpitations?  No  9.   Have you had any syncope? No  10. Do you have any wounds on legs?  No  
acute distress.     Appearance: Normal appearance. She is not ill-appearing or diaphoretic.   HENT:      Head: Normocephalic and atraumatic.   Eyes:      General: No scleral icterus.        Right eye: No discharge.         Left eye: No discharge.      Conjunctiva/sclera: Conjunctivae normal.   Neck:      Vascular: No carotid bruit.   Cardiovascular:      Rate and Rhythm: Normal rate and regular rhythm.      Heart sounds: Normal heart sounds. No murmur heard.     No friction rub. No gallop.   Pulmonary:      Effort: Pulmonary effort is normal. No respiratory distress.      Breath sounds: Normal breath sounds. No wheezing, rhonchi or rales.   Chest:      Chest wall: No tenderness.   Abdominal:      General: Bowel sounds are normal. There is no distension.      Palpations: Abdomen is soft.      Tenderness: There is no abdominal tenderness. There is no guarding.   Musculoskeletal:         General: No swelling or tenderness.      Cervical back: Neck supple.      Right lower leg: Edema present.      Left lower leg: Edema present.   Skin:     General: Skin is warm and dry.      Findings: No erythema or rash.   Neurological:      Mental Status: She is alert. Mental status is at baseline.      Motor: No weakness.      Gait: Gait normal.   Psychiatric:         Mood and Affect: Mood normal.         Behavior: Behavior normal.         Thought Content: Thought content normal.         ASSESSMENT and PLAN  Ms. Wilson has a reminder for a \"due or due soon\" health maintenance. I have asked that she contact her primary care provider for follow-up on this health maintenance.      Chronic heart failure preserved ejection fraction - some signs of hypervolemia with bl lower extremity edema, Continue with salt provisions 2g of salt per day and 2L of fluid per day, Continue torsemide 20mg po daily, ProBNP evaluate for increased intraventricular pressure.    Coronary artery disease history of PCI - Continue with aspirin 81mg po daily,

## 2025-07-28 RX ORDER — METOPROLOL TARTRATE 25 MG/1
TABLET, FILM COATED ORAL
Qty: 30 TABLET | Refills: 3 | Status: SHIPPED | OUTPATIENT
Start: 2025-07-28

## 2025-08-21 ENCOUNTER — HOSPITAL ENCOUNTER (OUTPATIENT)
Age: 70
Discharge: HOME | End: 2025-08-21
Payer: MEDICARE

## 2025-08-21 DIAGNOSIS — I50.32 CHRONIC HEART FAILURE WITH PRESERVED EJECTION FRACTION (HCC): ICD-10-CM

## 2025-08-21 LAB
ERYTHROCYTE [SEDIMENTATION RATE] IN BLOOD: 27 MM/HR (ref 0–30)
NT PRO BNP: 80 PG/ML (ref 36–900)
RHEUMATOID FACT SERPL-ACNC: <10 IU/ML (ref 0–15)
URATE SERPL-MCNC: 8.4 MG/DL (ref 2.6–7.2)

## 2025-08-21 PROCEDURE — 86235 NUCLEAR ANTIGEN ANTIBODY: CPT

## 2025-08-21 PROCEDURE — 84550 ASSAY OF BLOOD/URIC ACID: CPT

## 2025-08-21 PROCEDURE — 86225 DNA ANTIBODY NATIVE: CPT

## 2025-08-21 PROCEDURE — 83880 ASSAY OF NATRIURETIC PEPTIDE: CPT

## 2025-08-21 PROCEDURE — 36415 COLL VENOUS BLD VENIPUNCTURE: CPT

## 2025-08-21 PROCEDURE — 85652 RBC SED RATE AUTOMATED: CPT

## 2025-08-21 PROCEDURE — 86431 RHEUMATOID FACTOR QUANT: CPT

## 2025-08-23 LAB
CENTROMERE B AB SER-ACNC: <0.2 AI (ref 0–0.9)
CHROMATIN AB SERPL-ACNC: <0.2 AI (ref 0–0.9)
DSDNA AB SER-ACNC: <1 IU/ML (ref 0–9)
ENA JO1 AB SER-ACNC: <0.2 AI (ref 0–0.9)
ENA RNP AB SER-ACNC: 0.2 AI (ref 0–0.9)
ENA SCL70 AB SER-ACNC: <0.2 AI (ref 0–0.9)
ENA SM AB SER-ACNC: <0.2 AI (ref 0–0.9)
ENA SS-A AB SER-ACNC: <0.2 AI (ref 0–0.9)
ENA SS-B AB SER-ACNC: <0.2 AI (ref 0–0.9)
Lab: NORMAL

## (undated) DEVICE — (D)STRIP SKN CLSR 0.5X4IN WHT --

## (undated) DEVICE — CORD ES L10FT MPLR LAP

## (undated) DEVICE — MEDI-VAC SUCTION HIGH CAPACITY: Brand: CARDINAL HEALTH

## (undated) DEVICE — GAUZE SPONGES,8 PLY: Brand: CURITY

## (undated) DEVICE — TUBE KT ENDFLTN INSUFFLTN SVL --

## (undated) DEVICE — 3M™ BAIR PAWS FLEX™ WARMING GOWN, STANDARD, 20 PER CASE 81003: Brand: BAIR PAWS™

## (undated) DEVICE — SNARE POLYP M W27MMXL240CM OVL STIFF DISP CAPTIVATOR

## (undated) DEVICE — BLADED TROCAR WITH FIXATION CANNULA: Brand: VERSAONE

## (undated) DEVICE — SUTURE ETHLN SZ 2-0 L18IN NONABSORBABLE BLK L19MM PS-2 PRIM 593H

## (undated) DEVICE — CATHETER SUCT TR FL TIP 14FR W/ O CTRL

## (undated) DEVICE — STERILE POLYISOPRENE POWDER-FREE SURGICAL GLOVES: Brand: PROTEXIS

## (undated) DEVICE — CANNULA ORIG TL CLR W FOAM CUSHIONS AND 14FT SUPL TB 3 CHN

## (undated) DEVICE — RELOAD STPL L45MM H15 35MM REG TISS BLU 6 ROW B FRM NAT

## (undated) DEVICE — Device

## (undated) DEVICE — MEDI-VAC NON-CONDUCTIVE SUCTION TUBING: Brand: CARDINAL HEALTH

## (undated) DEVICE — SUTURE VCRL SZ 3-0 L27IN ABSRB UD L26MM SH 1/2 CIR J416H

## (undated) DEVICE — SOLUTION IRRIG 1000ML H2O STRL BLT

## (undated) DEVICE — FLEX ADVANTAGE 3000CC: Brand: FLEX ADVANTAGE

## (undated) DEVICE — RELOAD STPL SZ 0 L45MM DIA3.5MM 0DEG STD REG TISS BLU TI

## (undated) DEVICE — SOL ANTI-FOG 6ML MEDC -- MEDICHOICE - CONVERT TO 358427

## (undated) DEVICE — (D)SYR 10ML 1/5ML GRAD NSAF -- PKGING CHANGE USE ITEM 338027

## (undated) DEVICE — APPLIER RMFG CLP LIG LG 12MM --

## (undated) DEVICE — SYR 50ML SLIP TIP NSAF LF STRL --

## (undated) DEVICE — SYRINGE MED 25GA 3ML L5/8IN SUBQ PLAS W/ DETACH NDL SFTY

## (undated) DEVICE — CUTTER ENDOSCP L340MM LIN ARTC SGL STROKE FIRING ENDOPATH

## (undated) DEVICE — BITE BLOCK ENDOSCP UNIV AD 6 TO 9.4 MM

## (undated) DEVICE — SUTURE VCRL SZ 0 L27IN ABSRB UD L26MM CT-2 1/2 CIR J270H

## (undated) DEVICE — ENDOSCOPY PUMP TUBING/ CAP SET: Brand: ERBE

## (undated) DEVICE — SCISSORS LAPSCP L33CM DIA5MM CVD MULTIUSE HANDHELD

## (undated) DEVICE — TRAY PREP DRY W/ PREM GLV 2 APPL 6 SPNG 2 UNDPD 1 OVERWRAP

## (undated) DEVICE — FCPS RAD JAW 4LC 240CM W/NDL -- BX/20 RADIAL JAW 4

## (undated) DEVICE — STERILE LATEX POWDER-FREE SURGICAL GLOVESWITH NITRILE COATING: Brand: PROTEXIS

## (undated) DEVICE — FORCEPS BX L240CM JAW DIA2.8MM L CAP W/ NDL MIC MESH TOOTH

## (undated) DEVICE — AIRLIFE™ NASAL OXYGEN CANNULA CURVED, FLARED TIP WITH 14 FOOT (4.3 M) CRUSH-RESISTANT TUBING, OVER-THE-EAR STYLE: Brand: AIRLIFE™

## (undated) DEVICE — FCPS ENDOSCP 5MMX33CM -- ENDOPATH

## (undated) DEVICE — AIRLIFE™ NASAL OXYGEN CANNULA CURVED, NONFLARED TIP WITH 14 FOOT (4.3 M) CRUSH-RESISTANT TUBING, OVER-THE-EAR STYLE: Brand: AIRLIFE™

## (undated) DEVICE — SOLUTION LACTATED RINGERS INJECTION USP

## (undated) DEVICE — (D)GLOVE EXAM LG NITRL NS -- DISC BY MFR NO SUB

## (undated) DEVICE — NEEDLE HYPO 22GA L1.5IN BLK S STL HUB POLYPR SHLD REG BVL

## (undated) DEVICE — BLUNT TROCAR WITH THREADED ANCHOR: Brand: VERSAONE

## (undated) DEVICE — 3M™ STERI-STRIP™ COMPOUND BENZOIN TINCTURE 40 BAGS/CARTON 4 CARTONS/CASE C1544: Brand: 3M™ STERI-STRIP™

## (undated) DEVICE — GOWN ISOL IMPERV UNIV, DISP, OPEN BACK, BLUE --

## (undated) DEVICE — BAG SPEC RETRV 275ML 10ML DISPOSABLE RELIACATCH

## (undated) DEVICE — KIT CLN UP BON SECOURS MARYV

## (undated) DEVICE — ELECTRODE ES L34CM DIA5MM HK PRB + II ENDOPATH

## (undated) DEVICE — REM POLYHESIVE ADULT PATIENT RETURN ELECTRODE: Brand: VALLEYLAB

## (undated) DEVICE — GAUZE SPONGES,16 PLY: Brand: CURITY

## (undated) DEVICE — KENDALL SCD EXPRESS SLEEVES, KNEE LENGTH, MEDIUM: Brand: KENDALL SCD

## (undated) DEVICE — FLUFF AND POLYMER UNDERPAD,EXTRA HEAVY: Brand: WINGS

## (undated) DEVICE — DRAIN SURG W10MMXL20CM SIL FULL PERF HUBLESS FLAT RADPQ

## (undated) DEVICE — (D)BNDG ADHESIVE FABRIC 3/4X3 -- DISC BY MFR USE ITEM 357960

## (undated) DEVICE — SYRINGE MED 20ML STD CLR PLAS LUERLOCK TIP N CTRL DISP

## (undated) DEVICE — TUBING IRRIG PRESSURIZED BG SET SPIK PRB + II

## (undated) DEVICE — HANDLE PRB DIA5MM HND CTRL PSTL GRP ENDOPATH PRB + II

## (undated) DEVICE — BASIN EMESIS 500CC ROSE 250/CS 60/PLT: Brand: MEDEGEN MEDICAL PRODUCTS, LLC

## (undated) DEVICE — HEX-LOCKING BLADE ELECTRODE: Brand: EDGE

## (undated) DEVICE — SUTURE MCRYL SZ 4-0 L18IN ABSRB UD L19MM PS-2 3/8 CIR PRIM Y496G